# Patient Record
Sex: FEMALE | Race: WHITE | NOT HISPANIC OR LATINO | Employment: OTHER | URBAN - METROPOLITAN AREA
[De-identification: names, ages, dates, MRNs, and addresses within clinical notes are randomized per-mention and may not be internally consistent; named-entity substitution may affect disease eponyms.]

---

## 2017-01-03 ENCOUNTER — HOSPITAL ENCOUNTER (OUTPATIENT)
Dept: RADIOLOGY | Facility: HOSPITAL | Age: 67
Discharge: HOME | End: 2017-01-03
Attending: INTERNAL MEDICINE
Payer: MEDICARE

## 2017-01-03 ENCOUNTER — TRANSCRIBE ORDERS (OUTPATIENT)
Dept: ADMINISTRATIVE | Facility: HOSPITAL | Age: 67
End: 2017-01-03

## 2017-01-03 DIAGNOSIS — R06.02 SOB (SHORTNESS OF BREATH): ICD-10-CM

## 2017-01-03 DIAGNOSIS — R05.9 COUGH: Primary | ICD-10-CM

## 2017-01-03 DIAGNOSIS — R05.9 COUGH: ICD-10-CM

## 2017-01-03 PROCEDURE — 71020 HB CHEST X-RAY 2VW FRONTAL&LATL: CPT

## 2017-03-23 ENCOUNTER — TRANSCRIBE ORDERS (OUTPATIENT)
Dept: ADMINISTRATIVE | Facility: HOSPITAL | Age: 67
End: 2017-03-23

## 2017-03-23 DIAGNOSIS — M54.2 CERVICAL SPINE PAIN: Primary | ICD-10-CM

## 2017-03-30 ENCOUNTER — HOSPITAL ENCOUNTER (OUTPATIENT)
Dept: RADIOLOGY | Facility: HOSPITAL | Age: 67
Discharge: HOME | End: 2017-03-30
Attending: ANESTHESIOLOGY
Payer: MEDICARE

## 2017-03-30 DIAGNOSIS — M54.2 CERVICAL SPINE PAIN: ICD-10-CM

## 2017-03-30 PROCEDURE — 72141 MRI NECK SPINE W/O DYE: CPT

## 2017-05-11 ENCOUNTER — APPOINTMENT (OUTPATIENT)
Dept: LAB | Facility: CLINIC | Age: 67
End: 2017-05-11
Payer: MEDICARE

## 2017-05-11 ENCOUNTER — TRANSCRIBE ORDERS (OUTPATIENT)
Dept: LAB | Facility: CLINIC | Age: 67
End: 2017-05-11

## 2017-05-11 DIAGNOSIS — D64.0 CONGENITAL SIDEROBLASTIC ANEMIA (HCC): ICD-10-CM

## 2017-05-11 DIAGNOSIS — N18.1 CHRONIC KIDNEY DISEASE, STAGE I: ICD-10-CM

## 2017-05-11 DIAGNOSIS — R51.9 FACIAL PAIN: ICD-10-CM

## 2017-05-11 DIAGNOSIS — M79.10 MYALGIA: ICD-10-CM

## 2017-05-11 DIAGNOSIS — R10.9 ABDOMINAL PAIN, UNSPECIFIED SITE: ICD-10-CM

## 2017-05-11 DIAGNOSIS — R53.0 NEOPLASTIC MALIGNANT RELATED FATIGUE: ICD-10-CM

## 2017-05-11 DIAGNOSIS — Z79.899 HIGH RISK MEDICATION USE: ICD-10-CM

## 2017-05-11 DIAGNOSIS — R94.5 NONSPECIFIC ABNORMAL RESULTS OF LIVER FUNCTION STUDY: ICD-10-CM

## 2017-05-11 DIAGNOSIS — M25.50 PAIN IN JOINT, SITE UNSPECIFIED: ICD-10-CM

## 2017-05-11 DIAGNOSIS — R51.9 FACIAL PAIN: Primary | ICD-10-CM

## 2017-05-11 LAB
ALBUMIN SERPL BCP-MCNC: 3.9 G/DL (ref 3.5–5)
ALP SERPL-CCNC: 61 U/L (ref 46–116)
ALT SERPL W P-5'-P-CCNC: 28 U/L (ref 12–78)
ANION GAP SERPL CALCULATED.3IONS-SCNC: 6 MMOL/L (ref 4–13)
AST SERPL W P-5'-P-CCNC: 13 U/L (ref 5–45)
BASOPHILS # BLD AUTO: 0.03 THOUSANDS/ΜL (ref 0–0.1)
BASOPHILS NFR BLD AUTO: 0 % (ref 0–1)
BILIRUB SERPL-MCNC: 0.96 MG/DL (ref 0.2–1)
BUN SERPL-MCNC: 11 MG/DL (ref 5–25)
CALCIUM SERPL-MCNC: 8.6 MG/DL (ref 8.3–10.1)
CHLORIDE SERPL-SCNC: 105 MMOL/L (ref 100–108)
CHOLEST SERPL-MCNC: 175 MG/DL (ref 50–200)
CO2 SERPL-SCNC: 30 MMOL/L (ref 21–32)
CREAT SERPL-MCNC: 0.51 MG/DL (ref 0.6–1.3)
EOSINOPHIL # BLD AUTO: 0.19 THOUSAND/ΜL (ref 0–0.61)
EOSINOPHIL NFR BLD AUTO: 3 % (ref 0–6)
ERYTHROCYTE [DISTWIDTH] IN BLOOD BY AUTOMATED COUNT: 13 % (ref 11.6–15.1)
GFR SERPL CREATININE-BSD FRML MDRD: >60 ML/MIN/1.73SQ M
GLUCOSE P FAST SERPL-MCNC: 85 MG/DL (ref 65–99)
HCT VFR BLD AUTO: 44.7 % (ref 34.8–46.1)
HDLC SERPL-MCNC: 80 MG/DL (ref 40–60)
HGB BLD-MCNC: 14.8 G/DL (ref 11.5–15.4)
LDLC SERPL CALC-MCNC: 79 MG/DL (ref 0–100)
LYMPHOCYTES # BLD AUTO: 2.96 THOUSANDS/ΜL (ref 0.6–4.47)
LYMPHOCYTES NFR BLD AUTO: 40 % (ref 14–44)
MCH RBC QN AUTO: 32.1 PG (ref 26.8–34.3)
MCHC RBC AUTO-ENTMCNC: 33.1 G/DL (ref 31.4–37.4)
MCV RBC AUTO: 97 FL (ref 82–98)
MONOCYTES # BLD AUTO: 0.5 THOUSAND/ΜL (ref 0.17–1.22)
MONOCYTES NFR BLD AUTO: 7 % (ref 4–12)
NEUTROPHILS # BLD AUTO: 3.7 THOUSANDS/ΜL (ref 1.85–7.62)
NEUTS SEG NFR BLD AUTO: 50 % (ref 43–75)
NRBC BLD AUTO-RTO: 0 /100 WBCS
PLATELET # BLD AUTO: 294 THOUSANDS/UL (ref 149–390)
PMV BLD AUTO: 10.5 FL (ref 8.9–12.7)
POTASSIUM SERPL-SCNC: 3.7 MMOL/L (ref 3.5–5.3)
PROT SERPL-MCNC: 6.4 G/DL (ref 6.4–8.2)
RBC # BLD AUTO: 4.61 MILLION/UL (ref 3.81–5.12)
SODIUM SERPL-SCNC: 141 MMOL/L (ref 136–145)
T3 SERPL-MCNC: 0.7 NG/ML (ref 0.6–1.8)
T3FREE SERPL-MCNC: 2.35 PG/ML (ref 2.3–4.2)
T4 FREE SERPL-MCNC: 1.05 NG/DL (ref 0.76–1.46)
TRIGL SERPL-MCNC: 81 MG/DL
TSH SERPL DL<=0.05 MIU/L-ACNC: 0.69 UIU/ML (ref 0.36–3.74)
WBC # BLD AUTO: 7.39 THOUSAND/UL (ref 4.31–10.16)

## 2017-05-11 PROCEDURE — 84480 ASSAY TRIIODOTHYRONINE (T3): CPT

## 2017-05-11 PROCEDURE — 80053 COMPREHEN METABOLIC PANEL: CPT | Performed by: INTERNAL MEDICINE

## 2017-05-11 PROCEDURE — 84481 FREE ASSAY (FT-3): CPT

## 2017-05-11 PROCEDURE — 85025 COMPLETE CBC W/AUTO DIFF WBC: CPT | Performed by: INTERNAL MEDICINE

## 2017-05-11 PROCEDURE — 36415 COLL VENOUS BLD VENIPUNCTURE: CPT | Performed by: INTERNAL MEDICINE

## 2017-05-11 PROCEDURE — 84439 ASSAY OF FREE THYROXINE: CPT | Performed by: INTERNAL MEDICINE

## 2017-05-11 PROCEDURE — 84443 ASSAY THYROID STIM HORMONE: CPT

## 2017-05-11 PROCEDURE — 80061 LIPID PANEL: CPT | Performed by: INTERNAL MEDICINE

## 2018-04-23 ENCOUNTER — APPOINTMENT (OUTPATIENT)
Dept: PHYSICAL THERAPY | Facility: CLINIC | Age: 68
End: 2018-04-23
Payer: MEDICARE

## 2018-04-25 ENCOUNTER — EVALUATION (OUTPATIENT)
Dept: PHYSICAL THERAPY | Facility: CLINIC | Age: 68
End: 2018-04-25
Payer: MEDICARE

## 2018-04-25 DIAGNOSIS — M54.2 NECK PAIN: Primary | ICD-10-CM

## 2018-04-25 PROCEDURE — 97163 PT EVAL HIGH COMPLEX 45 MIN: CPT

## 2018-04-25 PROCEDURE — G8978 MOBILITY CURRENT STATUS: HCPCS

## 2018-04-25 PROCEDURE — G8979 MOBILITY GOAL STATUS: HCPCS

## 2018-04-25 NOTE — PROGRESS NOTES
PT Evaluation     Today's date: 2018  Patient name: Camelia Escalona  : [birth year]  MRN: [id number]  Referring provider: Merlin Sandifer, MD  Dx:   Encounter Diagnosis     ICD-10-CM    1  Neck pain M54 2        Start Time: 1145  Stop Time: 1230  Total time in clinic (min): 45 minutes    Assessment  Impairments: abnormal coordination, abnormal muscle tone, abnormal or restricted ROM, impaired balance, impaired physical strength and pain with function  Other impairment: Cervical Pain associated with ROM movements    Patient is not irritable  Assessment details: Patient is a 69year old female reporting to skilled PT with reports of neck pain and vertigo  Upon assessment of oculomotor function, patient had unremarkable findings that would indicate potential peripheral vestibular involvement causing her self reports of vertigo  Positional assessment for BPPV unremarkable  mCTSIB screening yields patient has difficulty with vestibular component of balance, losing balance after 5 seconds with feet together eyes closed on foam  Patient not deemed a fall risk via the DGI, TUG, or Gait Speed, but it should be noted that patient has a tendency to stumble with sit to stand transition, which is attributed to her low back pain, so this should be noted with her plan of care to prevent the potential for falls  Upon orthopedic examination of patient's cervical spine, patient presents with limited cervical spine ROM in all planes of motion tested, as well as tenderness to palpation in all cervical paraspinal and cervical musculature  Patient deep neck flexor strength limited, which could contribute to her neck pain  Patient limited in cervical strength in all tested planes  Special Testing for cervical radicular symptoms yielded positive findings for bilateral cervical radiculopathy symptoms extending down to her digits   Patient and treating therapist spoke about her progress, patient noted that at this time she is most limited with her cervical neck pain and bilateral UE radicular symptoms and requested that her POC focus around her reduction in UE radicular and cervical pain symptoms  Patient requires skilled PT to improve her bilateral cervical radicular symptoms, decrease her cervical pain, improve her bilateral UE function, to monitor her dizziness, and to maximize her function  Understanding of Dx/Px/POC: excellent   Prognosis: good    Goals  Short Term Goals:    1  Patient will improve her cervical spine ROM in all measured planes by 10 degrees or more in 4 weeks in order to improve her ability to turn her head while driving  2  Patient will decrease her cervical spine pain/discomfort to 4/10 or less in 4 weeks in order to improve her ability to sit statically with decreased neck pain  3  Patient will decrease her radicular symptoms in her bilateral UE to her elbow or more proximally in 4 weeks in order to improve her numbness and tingling in her fingers to allow her to  objects  4  Patient will improve her DGI by 2 points in 4 weeks in order to improve her dynamic balance capabilities  Long Term Goals:  1  Patient will improve her cervical spine ROM in all measured planes to WNL in 8 weeks in order to improve her ability to turn her head during conversation  2  Patient will improve her cervical spine MMT in all tested planes by 1/2 a grade or more in 8 weeks in order to improve her neck strength  3  Patient will improve her deep neck flexor strength to hold a proper chin tuck for at least 10 seconds to improve her deep neck flexor strength to improve her posture  4  Patient will be independent with comprehensive HEP in 8 weeks to manage her care at home         Plan  Planned modality interventions: biofeedback and TENS  Planned therapy interventions: abdominal trunk stabilization, ADL retraining, ADL training, joint mobilization, manual therapy, massage, IADL retraining, balance, balance/weight bearing training, motor coordination training, muscle pump exercises, neuromuscular re-education, patient education, postural training, body mechanics training, breathing training, compression, coordination, strengthening, stretching, therapeutic exercise, therapeutic activities, therapeutic training, transfer training, home exercise program, graded motor, graded exercise, graded activity, gait training, functional ROM exercises and flexibility  Other planned therapy interventions: CPT Codes: 38989, 01 39 27 97 60, [id number], 24129, 85687, , [id number], [id number], 96838  Frequency: 2x week  Duration in visits: 16  Duration in weeks: 8  Treatment plan discussed with: patient  Plan details: POC Certification Dates: 2018-2018        Subjective Evaluation    History of Present Illness  Date of onset: 2018  Mechanism of injury: Patient is a 69year old female reporting to skilled PT today for reports of neck pain, bilateral UE pain, and reports of dizziness  Patient states that she has been suffering from vertigo over the past few years, reporting "since "  Patient states that she states it is "under control, following up with my ENT every 3 months"  Patient reports currently she is suffering from "debilitating neck pain and arm numbness"  Patient reported that the pain is greatest with sitting for long periods of time, and cannot find a relieving position  Patient also reports a history of low back pain, which she notes is difficult to sit for long periods of time, and "when I stand up I slightly lose my balance due to pain in the low back"  Patient reports occasional blurriness, does not see an eye doctor     Recurrent probem    Quality of life: excellent    Pain  No pain reported  Current pain ratin  At best pain rating: 3  At worst pain ratin  Location: Cervical Spine   Quality: radiating and dull ache  Relieving factors: medications, relaxation and change in position  Aggravating factors: sitting and overhead activity  Progression: worsening    Social Support  Steps to enter house: yes  0  Stairs in house: yes   12  Lives with: alone    Employment status: not working  Hand dominance: right  Exercise history: Walks dog for 20 minutes every day      Diagnostic Tests  MRI studies: abnormal  Treatments  Previous treatment: physical therapy (Physical Therapy on R shoulder)  Patient Goals  Patient goals for therapy: return to sport/leisure activities and decreased pain  Patient goal: Patient reports her goals are to improve her head and neck pain and to improve her neck pressure           Objective     Static Posture     Comments  MCTSIB-   Feet Together, Eyes Open Firm- 30 seconds  Feet Together, Eyes Closed Firm- 30 seconds  Feet Together, Eyes Open Foam- 30 seconds  Feet Together, Eyes Closed Foam- 5 seconds     Active Range of Motion   Cervical/Thoracic Spine   Cervical    Flexion: 55 degrees   Extension: 40 degrees   Left lateral flexion: 30 degrees   Right lateral flexion: 35 degrees   Left rotation: 65 degrees   Right rotation: 60 degrees     Strength/Myotome Testing   Cervical Spine   Neck extension: 3  Neck flexion: 4 (pain)    Left   Interossei strength (t1): 4+  Neck lateral flexion (C3): 3+    Right   Interossei strength (t1): 4+etr  Neck lateral flexion (C3): 3+    Additional Strength Details  Cervical Rotation Bilaterally- 4-/5     Deep Neck Flexor Strength- unable to tuck chin without activating SCM and bilateral Scalenes    Ambulation     Comments   Gait Speed- 10 meters/8 2 seconds= 1 21 meters/second  TUG- 7 51 seconds    It should be noted that the patient experiences low back pain with sit to stand transitions that causes her to occasionally mis-step and stumble    General Comments     Cervical/Thoracic Comments  MVBI: Negative Bilaterally; bilateral UE numbness with testing     Oculomotor Assessment  -Smooth Pursuits- Normal Tracking  -NPC- Normal Tracking, 4" away  -Saccades- Normal  -VOR- No dizziness  -VOR Cancel- No dizziness, normal tracking  -Head Thrust- Negative bilaterally     Positional Testing-   [place]-Hallpike R- Negative 1x  Angella-Hallpike L- Negative 1x    Special Tests-   -Spurling A Bilaterally- positive  -Spurling B Bilaterally- positive  -Cervical Compression- Positive for cervical pain  -Cervical Distraction- positive for cervical muscular pain      Flowsheet Rows      Most Recent Value   PT/OT G-Codes   Current Score  45   Projected Score  74   FOTO information reviewed  Yes   Assessment Type  Evaluation   G code set  Mobility: Walking & Moving Around   Mobility: Walking and Moving Around Current Status ()  CK   Mobility: Walking and Moving Around Goal Status ()        DGI- 22/24    Precautions: Bilateral UE Radiculopathy    Daily Treatment Diary     Manual                                                                                   Exercise Diary                                                                                                                                                                                                                                                                                      Modalities

## 2018-04-25 NOTE — LETTER
May 1, 2018    Georgia Devlin MD  [address] [address]    Patient: Noa Triplett   YOB: 1950   Date of Visit: 2018     Encounter Diagnosis     ICD-10-CM    1  Neck pain M54 2        Dear Dr Jo Carry:    [facility]  Please verify that you agree therapy should continue by signing the attached document and sending it back to our office  If you have any questions or concerns, please don't hesitate to call  Sincerely,    Carla Caban PT      Referring Provider:      I certify that I have read the below Plan of Care and certify the need for these services furnished under this plan of treatment while under my care  Georgia Devlin MD  [address]  [address] [id number]  VIA Facsimile: [phone]          PT Evaluation     Today's date: 2018  Patient name: Noa Triplett  :   MRN: [id number]  Referring provider: Zoey Jamil MD  Dx:   Encounter Diagnosis     ICD-10-CM    1  Neck pain M54 2        Start Time: 1145  Stop Time: 1230  Total time in clinic (min): 45 minutes    Assessment  Impairments: abnormal coordination, abnormal muscle tone, abnormal or restricted ROM, impaired balance, impaired physical strength and pain with function  Other impairment: Cervical Pain associated with ROM movements    Patient is not irritable  Assessment details: Patient is a 69year old female reporting to skilled PT with reports of neck pain and vertigo  Upon assessment of oculomotor function, patient had unremarkable findings that would indicate potential peripheral vestibular involvement causing her self reports of vertigo  Positional assessment for BPPV unremarkable   mCTSIB screening yields patient has difficulty with vestibular component of balance, losing balance after 5 seconds with feet together eyes closed on foam  Patient not deemed a fall risk via the DGI, TUG, or Gait Speed, but it should be noted that patient has a tendency to stumble with sit to stand transition, which is attributed to her low back pain, so this should be noted with her plan of care to prevent the potential for falls  Upon orthopedic examination of patient's cervical spine, patient presents with limited cervical spine ROM in all planes of motion tested, as well as tenderness to palpation in all cervical paraspinal and cervical musculature  Patient deep neck flexor strength limited, which could contribute to her neck pain  Patient limited in cervical strength in all tested planes  Special Testing for cervical radicular symptoms yielded positive findings for bilateral cervical radiculopathy symptoms extending down to her digits  Patient and treating therapist spoke about her progress, patient noted that at this time she is most limited with her cervical neck pain and bilateral UE radicular symptoms and requested that her POC focus around her reduction in UE radicular and cervical pain symptoms  Patient requires skilled PT to improve her bilateral cervical radicular symptoms, decrease her cervical pain, improve her bilateral UE function, to monitor her dizziness, and to maximize her function  Understanding of Dx/Px/POC: excellent   Prognosis: good    Goals  Short Term Goals:    1  Patient will improve her cervical spine ROM in all measured planes by 10 degrees or more in 4 weeks in order to improve her ability to turn her head while driving  2  Patient will decrease her cervical spine pain/discomfort to 4/10 or less in 4 weeks in order to improve her ability to sit statically with decreased neck pain  3  Patient will decrease her radicular symptoms in her bilateral UE to her elbow or more proximally in 4 weeks in order to improve her numbness and tingling in her fingers to allow her to  objects     4  Patient will improve her DGI by 2 points in 4 weeks in order to improve her dynamic balance capabilities  Long Term Goals:  1  Patient will improve her cervical spine ROM in all measured planes to WNL in 8 weeks in order to improve her ability to turn her head during conversation  2  Patient will improve her cervical spine MMT in all tested planes by 1/2 a grade or more in 8 weeks in order to improve her neck strength  3  Patient will improve her deep neck flexor strength to hold a proper chin tuck for at least 10 seconds to improve her deep neck flexor strength to improve her posture  4  Patient will be independent with comprehensive HEP in 8 weeks to manage her care at home  Plan  Planned modality interventions: biofeedback and TENS  Planned therapy interventions: abdominal trunk stabilization, ADL retraining, ADL training, joint mobilization, manual therapy, massage, IADL retraining, balance, balance/weight bearing training, motor coordination training, muscle pump exercises, neuromuscular re-education, patient education, postural training, body mechanics training, breathing training, compression, coordination, strengthening, stretching, therapeutic exercise, therapeutic activities, therapeutic training, transfer training, home exercise program, graded motor, graded exercise, graded activity, gait training, functional ROM exercises and flexibility  Other planned therapy interventions: CPT Codes: 92182, 18901, [id number], 06275, 51390, , [id number], [id number], 27070  Frequency: 2x week  Duration in visits: 16  Duration in weeks: 8  Treatment plan discussed with: patient  Plan details: POC Certification Dates: 4/25/2018-6/20/2018        Subjective Evaluation    History of Present Illness  Date of onset: 1/31/2018  Mechanism of injury: Patient is a 69year old female reporting to skilled PT today for reports of neck pain, bilateral UE pain, and reports of dizziness  Patient states that she has been suffering from vertigo over the past few years, reporting "since 2003"   Patient states that she states it is "under control, following up with my ENT every 3 months"  Patient reports currently she is suffering from "debilitating neck pain and arm numbness"  Patient reported that the pain is greatest with sitting for long periods of time, and cannot find a relieving position  Patient also reports a history of low back pain, which she notes is difficult to sit for long periods of time, and "when I stand up I slightly lose my balance due to pain in the low back"  Patient reports occasional blurriness, does not see an eye doctor  Recurrent probem    Quality of life: excellent    Pain  No pain reported  Current pain ratin  At best pain rating: 3  At worst pain ratin  Location: Cervical Spine   Quality: radiating and dull ache  Relieving factors: medications, relaxation and change in position  Aggravating factors: sitting and overhead activity  Progression: worsening    Social Support  Steps to enter house: yes  0  Stairs in house: yes   12  Lives with: alone    Employment status: not working  Hand dominance: right  Exercise history: Walks dog for 20 minutes every day      Diagnostic Tests  MRI studies: abnormal  Treatments  Previous treatment: physical therapy (Physical Therapy on R shoulder)  Patient Goals  Patient goals for therapy: return to sport/leisure activities and decreased pain  Patient goal: Patient reports her goals are to improve her head and neck pain and to improve her neck pressure           Objective     Static Posture     Comments  MCTSIB-   Feet Together, Eyes Open Firm- 30 seconds  Feet Together, Eyes Closed Firm- 30 seconds  Feet Together, Eyes Open Foam- 30 seconds  Feet Together, Eyes Closed Foam- 5 seconds     Active Range of Motion   Cervical/Thoracic Spine   Cervical    Flexion: 55 degrees   Extension: 40 degrees   Left lateral flexion: 30 degrees   Right lateral flexion: 35 degrees   Left rotation: 65 degrees   Right rotation: 60 degrees     Strength/Myotome Testing   Cervical Spine Neck extension: 3  Neck flexion: 4 (pain)    Left   Interossei strength (t1): 4+  Neck lateral flexion (C3): 3+    Right   Interossei strength (t1): 4+etr  Neck lateral flexion (C3): 3+    Additional Strength Details  Cervical Rotation Bilaterally- 4-/5     Deep Neck Flexor Strength- unable to tuck chin without activating SCM and bilateral Scalenes    Ambulation     Comments   Gait Speed- 10 meters/8 2 seconds= 1 21 meters/second  TUG- 7 51 seconds    It should be noted that the patient experiences low back pain with sit to stand transitions that causes her to occasionally mis-step and stumble    General Comments     Cervical/Thoracic Comments  MVBI: Negative Bilaterally; bilateral UE numbness with testing     Oculomotor Assessment  -Smooth Pursuits- Normal Tracking  -NPC- Normal Tracking, 4" away  -Saccades- Normal  -VOR- No dizziness  -VOR Cancel- No dizziness, normal tracking  -Head Thrust- Negative bilaterally     Positional Testing-   Angella-Hallpike R- Negative 1x  Angella-Hallpike L- Negative 1x    Special Tests-   -Spurling A Bilaterally- positive  -Spurling B Bilaterally- positive  -Cervical Compression- Positive for cervical pain  -Cervical Distraction- positive for cervical muscular pain      Flowsheet Rows      Most Recent Value   PT/OT G-Codes   Current Score  45   Projected Score  74   FOTO information reviewed  Yes   Assessment Type  Evaluation   G code set  Mobility: Walking & Moving Around   Mobility: Walking and Moving Around Current Status ()  CK   Mobility: Walking and Moving Around Goal Status ()        DGI- 22/24    Precautions: Bilateral UE Radiculopathy    Daily Treatment Diary     Manual                                                                                   Exercise Diary Modalities

## 2018-05-01 ENCOUNTER — TRANSCRIBE ORDERS (OUTPATIENT)
Dept: PHYSICAL THERAPY | Facility: CLINIC | Age: 68
End: 2018-05-01

## 2018-05-01 DIAGNOSIS — M54.2 NECK PAIN: Primary | ICD-10-CM

## 2018-05-08 ENCOUNTER — EVALUATION (OUTPATIENT)
Dept: PHYSICAL THERAPY | Facility: CLINIC | Age: 68
End: 2018-05-08
Payer: MEDICARE

## 2018-05-08 DIAGNOSIS — M54.2 NECK PAIN: Primary | ICD-10-CM

## 2018-05-08 PROCEDURE — 97110 THERAPEUTIC EXERCISES: CPT | Performed by: PHYSICAL THERAPIST

## 2018-05-08 PROCEDURE — 97140 MANUAL THERAPY 1/> REGIONS: CPT | Performed by: PHYSICAL THERAPIST

## 2018-05-08 PROCEDURE — G8981 BODY POS CURRENT STATUS: HCPCS

## 2018-05-08 PROCEDURE — G8982 BODY POS GOAL STATUS: HCPCS

## 2018-05-08 NOTE — PROGRESS NOTES
Daily Note     Today's date: 2018  Patient name: Arlene Weber  :   MRN: [id number]  Referring provider: aMrk De La Vega MD  Dx:   Encounter Diagnosis     ICD-10-CM    1  Neck pain M54 2                   Subjective: Pt reports 7/10 pain in bilateral cervical spine today  States that she has the worst pain in the morning  She also has bilateral arm numbness from elbow to fingers  Objective: See treatment diary below    Precautions -  GERD , IBSD, RBOT , Fibromyalgia , Allergy to bandaids / tapes    Specialty Daily Treatment Diary     Manual  18      STM to paraspinals, UT , levator scap  8 min      C-S distraction  3 min      C-S PROM  4 min                          Exercise Diary         C-S isometrics  10x   5 "      Side bend AROM  10x      Rotation AROM  10x                                                                                                                                                  Modalities        MH  10 min              Visit # 1 2                Assessment: Tolerated treatment well  Patient would benefit from continued PT  Tightness throughout upper quarter musculature  Plan: Continue per plan of care

## 2018-05-14 ENCOUNTER — APPOINTMENT (OUTPATIENT)
Dept: PHYSICAL THERAPY | Facility: CLINIC | Age: 68
End: 2018-05-14
Payer: MEDICARE

## 2018-05-16 ENCOUNTER — APPOINTMENT (OUTPATIENT)
Dept: PHYSICAL THERAPY | Facility: CLINIC | Age: 68
End: 2018-05-16
Payer: MEDICARE

## 2018-05-18 ENCOUNTER — OFFICE VISIT (OUTPATIENT)
Dept: PHYSICAL THERAPY | Facility: CLINIC | Age: 68
End: 2018-05-18
Payer: MEDICARE

## 2018-05-18 DIAGNOSIS — M54.2 NECK PAIN: Primary | ICD-10-CM

## 2018-05-18 PROCEDURE — 97110 THERAPEUTIC EXERCISES: CPT | Performed by: PHYSICAL THERAPIST

## 2018-05-18 PROCEDURE — 97140 MANUAL THERAPY 1/> REGIONS: CPT | Performed by: PHYSICAL THERAPIST

## 2018-05-18 NOTE — PROGRESS NOTES
Daily Note     Today's date: 2018  Patient name: iMnnie Lamar  :   MRN: [id number]  Referring provider: Aminata Franco MD  Dx:   Encounter Diagnosis     ICD-10-CM    1  Neck pain M54 2                   Subjective: Pt reports feeling painfree for one hour following last treatment  Objective: See treatment diary below    Precautions -  GERD , IBSD, RBOT , Fibromyalgia , Allergy to bandaids / tapes    Specialty Daily Treatment Diary     Manual  18     STM to paraspinals, UT , levator scap  8 min 14 min     C-S distraction  3 min 3 min     C-S PROM  4 min 4 min                         Exercise Diary         C-S isometrics  10x   5 " 10x     Side bend AROM  10x 20x     Rotation AROM  10x 20x     NuStep   10 min                                                                                                                                         Modalities        MH  10 min deferred             Visit # 1 2 3               Assessment: Left side > Right side is positive for TTP levator and UT  Plan: Continue per plan of care

## 2018-05-22 ENCOUNTER — OFFICE VISIT (OUTPATIENT)
Dept: PHYSICAL THERAPY | Facility: CLINIC | Age: 68
End: 2018-05-22
Payer: MEDICARE

## 2018-05-22 DIAGNOSIS — M54.2 NECK PAIN: Primary | ICD-10-CM

## 2018-05-22 PROCEDURE — 97140 MANUAL THERAPY 1/> REGIONS: CPT | Performed by: PHYSICAL THERAPIST

## 2018-05-22 PROCEDURE — 97110 THERAPEUTIC EXERCISES: CPT | Performed by: PHYSICAL THERAPIST

## 2018-05-22 NOTE — PROGRESS NOTES
Daily Note     Today's date: 2018  Patient name: Minnie Lamar  :   MRN: [id number]  Referring provider: Aminata Franco MD  Dx:   Encounter Diagnosis     ICD-10-CM    1  Neck pain M54 2                   Subjective: Pt reports feeling soreness following last session  She feels it was due to the NuStep  Objective: See treatment diary below    Precautions -  GERD , IBSD, RBOT , Fibromyalgia , Allergy to bandaids / tapes    Specialty Daily Treatment Diary     Manual  18    STM to paraspinals, UT , levator scap  8 min 14 min 12 min    C-S distraction  3 min 3 min 4 min    C-S PROM  4 min 4 min 3 min                        Exercise Diary         C-S isometrics  10x   5 " 10x 10    Side bend AROM  10x 20x 20x    Rotation AROM  10x 20x 20x    NuStep   10 min ---    Scap squeezes    20x                                                                                                                                Modalities        MH  10 min deferred 10 min            Visit # 1 2 3 4              Assessment: No dizziness reported during session  She felt no increase in pain following treatment  Plan: Continue per plan of care

## 2018-05-24 ENCOUNTER — OFFICE VISIT (OUTPATIENT)
Dept: PHYSICAL THERAPY | Facility: CLINIC | Age: 68
End: 2018-05-24
Payer: MEDICARE

## 2018-05-24 DIAGNOSIS — M54.2 NECK PAIN: Primary | ICD-10-CM

## 2018-05-24 PROCEDURE — 97110 THERAPEUTIC EXERCISES: CPT

## 2018-05-24 PROCEDURE — 97140 MANUAL THERAPY 1/> REGIONS: CPT

## 2018-05-24 NOTE — PROGRESS NOTES
Daily Note     Today's date: 2018  Patient name: Doroteo Roldan  : [id number]  MRN: [id number]  Referring provider: Oly Pillai MD  Dx:   Encounter Diagnosis     ICD-10-CM    1  Neck pain M54 2                   Subjective: Pt reports 7/10 pain at "the bone that runs up behind my ear" referring to R side  Stated that her stomach was not feeling well  Objective: See treatment diary below    Precautions -  GERD , IBSD, RBOT , Fibromyalgia , Allergy to bandaids / tapes    Specialty Daily Treatment Diary     Manual  18   STM to paraspinals, UT , levator scap  8 min 14 min 12 min 13   C-S distraction  3 min 3 min 4 min 4 min   C-S PROM  4 min 4 min 3 min 3 min                       Exercise Diary         C-S isometrics  10x   5 " 10x 10 10 min   Side bend AROM  10x 20x 20x 20    Rotation AROM  10x 20x 20x 20    NuStep   10 min ---    Scap squeezes    20x 20                                                                                                                               Modalities        MH  10 min deferred 10 min 10 min           Visit # 1 2 3 4 5             Assessment: Pt reported feeling dizzy when she sat up from supine position which passed  Stated that she felt relief from pressure in R scalenes and Ut after session      Plan: Continue per plan of care

## 2018-05-29 ENCOUNTER — OFFICE VISIT (OUTPATIENT)
Dept: PHYSICAL THERAPY | Facility: CLINIC | Age: 68
End: 2018-05-29
Payer: MEDICARE

## 2018-05-29 DIAGNOSIS — M54.2 NECK PAIN: Primary | ICD-10-CM

## 2018-05-29 PROCEDURE — 97140 MANUAL THERAPY 1/> REGIONS: CPT | Performed by: PHYSICAL THERAPIST

## 2018-05-29 PROCEDURE — 97110 THERAPEUTIC EXERCISES: CPT | Performed by: PHYSICAL THERAPIST

## 2018-05-29 NOTE — PROGRESS NOTES
Daily Note     Today's date: 2018  Patient name: Vishal Monday  :   MRN: [id number]  Referring provider: Juanita Milian MD  Dx:   Encounter Diagnosis     ICD-10-CM    1  Neck pain M54 2                   Subjective: Pt reports 6/10 pain now but it was very sore on Thursday last week  She had C-S pain and bilateral upper arm pain  Objective: See treatment diary below    Precautions -  GERD , IBSD, RBOT , Fibromyalgia , Allergy to bandaids / tapes    Specialty Daily Treatment Diary     Manual  18       STM to paraspinals, UT , levator scap 12 min       C-S distraction 4 min       C-S PROM 3 min                           Exercise Diary         C-S isometrics 10x       Side bend AROM 20x       Rotation AROM 20x       NuStep        Scap squeezes 20x                                                                                                                                   Modalities         10 min               Visit # 6                 Assessment: Improved PROM for C-S SB today without increase in pain  Scalene restrictions respond well to MFR  Plan: Continue per plan of care

## 2018-06-01 ENCOUNTER — APPOINTMENT (OUTPATIENT)
Dept: PHYSICAL THERAPY | Facility: CLINIC | Age: 68
End: 2018-06-01
Payer: MEDICARE

## 2018-06-06 ENCOUNTER — OFFICE VISIT (OUTPATIENT)
Dept: PHYSICAL THERAPY | Facility: CLINIC | Age: 68
End: 2018-06-06
Payer: MEDICARE

## 2018-06-06 DIAGNOSIS — M54.2 NECK PAIN: Primary | ICD-10-CM

## 2018-06-06 PROCEDURE — 97110 THERAPEUTIC EXERCISES: CPT | Performed by: PHYSICAL THERAPIST

## 2018-06-06 PROCEDURE — 97140 MANUAL THERAPY 1/> REGIONS: CPT | Performed by: PHYSICAL THERAPIST

## 2018-06-06 NOTE — PROGRESS NOTES
Daily Note     Today's date: 2018  Patient name: Jena Hoffmann  :   MRN: [id number]  Referring provider: Jean-Pierre Blank MD  Dx:   Encounter Diagnosis     ICD-10-CM    1  Neck pain M54 2                   Subjective: She has a soreness today  She reports she is now able to lie on her Right side and is able to sleep for 4 to 5 straight hours  Objective: See treatment diary below    Precautions -  GERD , IBSD, RBOT , Fibromyalgia , Allergy to bandaids / tapes    Specialty Daily Treatment Diary     Manual  18      STM to paraspinals, UT , levator scap 12 min 12 min      C-S distraction 4 min 2 min      C-S PROM 3 min 4 min                          Exercise Diary         C-S isometrics 10x 10x      Side bend AROM 20x 20x      Rotation AROM 20x 20x      NuStep        Scap squeezes 20x 20x      Over head ball raise  Modalities        MH 10 min 10 min              Visit # 6 7                Assessment:  Soft tissue mobility in UT and paraspinals is improved  Plan: Continue PT  Attempt ball raise over head

## 2018-06-11 ENCOUNTER — OFFICE VISIT (OUTPATIENT)
Dept: PHYSICAL THERAPY | Facility: CLINIC | Age: 68
End: 2018-06-11
Payer: MEDICARE

## 2018-06-11 DIAGNOSIS — M54.2 NECK PAIN: Primary | ICD-10-CM

## 2018-06-11 PROCEDURE — 97140 MANUAL THERAPY 1/> REGIONS: CPT

## 2018-06-11 PROCEDURE — 97110 THERAPEUTIC EXERCISES: CPT

## 2018-06-11 NOTE — PROGRESS NOTES
Daily Note     Today's date: 2018  Patient name: Emily Mccarthy  :   MRN: [id number]  Referring provider: Sara Renteria MD  Dx:   Encounter Diagnosis     ICD-10-CM    1  Neck pain M54 2        Start Time: 1300  Stop Time: 1350  Total time in clinic (min): 50 minutes    Subjective: Pt reports soreness on the left side of her neck - right side is better; pt admits she overdid it moving some wrought iron furniture this past weekend         Objective: See treatment diary below    Precautions -  GERD , IBSD, RBOT , Fibromyalgia , Allergy to bandaids / tapes    Specialty Daily Treatment Diary     Manual  18     STM to paraspinals, UT , levator scap 12 min 12 min 12 min      C-S distraction 4 min 2 min 5 min      C-S PROM 3 min 4 min                          Exercise Diary         C-S isometrics 10x 10x 10 x adding ext      Side bend AROM 20x 20x 10 x e a     Rotation AROM 20x 20x 10 x ea     NuStep        Scap squeezes 20x 20x 10 x/ yellow tband x 10      Over head ball raise  Home         Lev scap stretch seated   3 x 10 sec         UT stretch seated 3 x 10 sec                                                                                                          Modalities        MH 10 min 10 min 10 min supine              Visit # 6 7 8                Assessment: Tolerated treatment well  Patient would benefit from continued PT ; pt with cord like tightness left scalenes; pt had some discomfort in L/S performing scap rows with yellow tband - cueing to utilize TrA which decreased pain; pt reported some increased relief with STM/self stretching     Plan: Continue per plan of care

## 2018-06-13 ENCOUNTER — OFFICE VISIT (OUTPATIENT)
Dept: PHYSICAL THERAPY | Facility: CLINIC | Age: 68
End: 2018-06-13
Payer: MEDICARE

## 2018-06-13 DIAGNOSIS — M54.2 NECK PAIN: Primary | ICD-10-CM

## 2018-06-13 PROCEDURE — 97140 MANUAL THERAPY 1/> REGIONS: CPT | Performed by: PHYSICAL THERAPIST

## 2018-06-13 NOTE — PROGRESS NOTES
Daily Note     Today's date: 2018  Patient name: Vishal Monday  : [id number]  MRN: [id number]  Referring provider: Juanita Milian MD  Dx:   Encounter Diagnosis     ICD-10-CM    1  Neck pain M54 2                   Subjective: Still sore on left side from moving her deck furniture this past weekend  Pain 5/10   Objective: See treatment diary below    Precautions -  GERD , IBSD, RBOT , Fibromyalgia , Allergy to bandaids / tapes    Specialty Daily Treatment Diary     Manual  18     STM to paraspinals, UT , levator scap 12 min 12 min 12 min  18 min    C-S distraction 4 min 2 min 5 min  3 min    C-S PROM 3 min 4 min  4 min                        Exercise Diary         C-S isometrics 10x 10x 10 x adding ext      Side bend AROM 20x 20x 10 x e a 10x    Rotation AROM 20x 20x 10 x ea 10x    NuStep        Scap squeezes 20x 20x 10 x/ yellow tband x 10  10x    Over head ball raise  Home         Lev scap stretch seated   3 x 10 sec         UT stretch seated 3 x 10 sec                                                                                                          Modalities        MH 10 min 10 min 10 min supine  10 min            Visit # 6 7 8  9              Assessment:  She was positive for tightness and tenderness Left > Right UT, levator and SCM  Improved by the end of session with STM techniques  Plan: Continue per plan of care

## 2018-06-20 ENCOUNTER — OFFICE VISIT (OUTPATIENT)
Dept: PHYSICAL THERAPY | Facility: CLINIC | Age: 68
End: 2018-06-20
Payer: MEDICARE

## 2018-06-20 DIAGNOSIS — M54.2 NECK PAIN: Primary | ICD-10-CM

## 2018-06-20 PROCEDURE — 97140 MANUAL THERAPY 1/> REGIONS: CPT

## 2018-06-20 PROCEDURE — 97110 THERAPEUTIC EXERCISES: CPT

## 2018-06-20 NOTE — PROGRESS NOTES
Daily Note     Today's date: 2018  Patient name: Donnell Larsen  :   MRN: [id number]  Referring provider: Emilie Dial MD  Dx:   Encounter Diagnosis     ICD-10-CM    1  Neck pain M54 2                   Subjective:  Pt reports 8/10 L sided CS pain which is into L shld and down L UE  Objective: See treatment diary below    Precautions -  GERD , IBSD, RBOT , Fibromyalgia , Allergy to bandaids / tapes    Specialty Daily Treatment Diary     Manual  5/29/18 6/6/18 6/11/18 6/15/18 6/20/18   STM to paraspinals, UT , levator scap 12 min 12 min 12 min  18 min 22 min   C-S distraction 4 min 2 min 5 min  3 min    C-S PROM 3 min 4 min  4 min 3 min                       Exercise Diary         C-S isometrics 10x 10x 10 x adding ext      Side bend AROM 20x 20x 10 x e a 10x 10   Rotation AROM 20x 20x 10 x ea 10x 10   NuStep        Scap squeezes 20x 20x 10 x/ yellow tband x 10  10x 10   Over head ball raise  Home         Lev scap stretch seated   3 x 10 sec         UT stretch seated 3 x 10 sec                                                                                                          Modalities        MH 10 min 10 min 10 min supine  10 min 10 min           Visit # 6 7 8  9 10 Foto done             Assessment:  She was positive for tightness and tenderness Left > Right UT, levator and SCM  Pt attempted stretching with no difficulty on R but, displayed a great deal of pain when attempting to stretch L side and activitiy was stopped  Limited PROM movement secondary to pain  Improved by the end of session with STM techniques  Plan: Continue per plan of care

## 2018-06-22 ENCOUNTER — OFFICE VISIT (OUTPATIENT)
Dept: PHYSICAL THERAPY | Facility: CLINIC | Age: 68
End: 2018-06-22
Payer: MEDICARE

## 2018-06-22 DIAGNOSIS — M54.2 NECK PAIN: Primary | ICD-10-CM

## 2018-06-22 PROCEDURE — 97110 THERAPEUTIC EXERCISES: CPT | Performed by: PHYSICAL THERAPIST

## 2018-06-22 PROCEDURE — G8982 BODY POS GOAL STATUS: HCPCS | Performed by: PHYSICAL THERAPIST

## 2018-06-22 PROCEDURE — G8981 BODY POS CURRENT STATUS: HCPCS | Performed by: PHYSICAL THERAPIST

## 2018-06-22 PROCEDURE — 97140 MANUAL THERAPY 1/> REGIONS: CPT | Performed by: PHYSICAL THERAPIST

## 2018-06-25 ENCOUNTER — APPOINTMENT (OUTPATIENT)
Dept: PHYSICAL THERAPY | Facility: CLINIC | Age: 68
End: 2018-06-25
Payer: MEDICARE

## 2018-06-27 ENCOUNTER — APPOINTMENT (OUTPATIENT)
Dept: PHYSICAL THERAPY | Facility: CLINIC | Age: 68
End: 2018-06-27
Payer: MEDICARE

## 2018-07-03 ENCOUNTER — OFFICE VISIT (OUTPATIENT)
Dept: PHYSICAL THERAPY | Facility: CLINIC | Age: 68
End: 2018-07-03
Payer: MEDICARE

## 2018-07-03 DIAGNOSIS — M54.2 NECK PAIN: Primary | ICD-10-CM

## 2018-07-03 PROCEDURE — 97110 THERAPEUTIC EXERCISES: CPT | Performed by: PHYSICAL THERAPIST

## 2018-07-03 PROCEDURE — 97140 MANUAL THERAPY 1/> REGIONS: CPT | Performed by: PHYSICAL THERAPIST

## 2018-07-03 NOTE — PROGRESS NOTES
Daily Note     Today's date: 7/3/2018  Patient name: Cassie Valdez  :   MRN: [id number]  Referring provider: Edouard An MD  Dx:   Encounter Diagnosis     ICD-10-CM    1  Neck pain M54 2                   Subjective:  She has left C-S pain today and c/o headache  Pain 6/10  Increase in pain possibly due to skimming her pool 3 times daily  Objective: See treatment diary below    Precautions -  GERD , IBSD, RBOT , Fibromyalgia , Allergy to bandaids / tapes    Specialty Daily Treatment Diary     Manual  6/22/18 7/3/18      STM to paraspinals, UT , levator scap 12 min 15 min      C-S distraction 4 min 3 min      C-S PROM 3 min 4 min                          Exercise Diary         C-S isometrics 10x 10x      Side bend AROM 20x 10x      Rotation AROM 20x 10x      NuStep        Scap squeezes 20x 10x      Over head ball raise  10x 10x      Lev scap stretch        UT stretch 10x 10x                                                                                                          Modalities        MH 10 min 10 min              Visit # 11 12                Assessment:   She agreed to lessening the number of times she skims her pool  Left sided UT is tight  Plan: Continue per plan of care

## 2018-07-06 ENCOUNTER — OFFICE VISIT (OUTPATIENT)
Dept: PHYSICAL THERAPY | Facility: CLINIC | Age: 68
End: 2018-07-06
Payer: MEDICARE

## 2018-07-06 DIAGNOSIS — M54.2 NECK PAIN: Primary | ICD-10-CM

## 2018-07-06 PROCEDURE — 97140 MANUAL THERAPY 1/> REGIONS: CPT

## 2018-07-06 NOTE — PROGRESS NOTES
Daily Note     Today's date: 2018  Patient name: Annie Russell  : [id number]  MRN: [id number]  Referring provider: Nic King MD  Dx:   Encounter Diagnosis     ICD-10-CM    1  Neck pain M54 2                   Subjective:  Pt reports no CS pain today stating that this was a very good week for her and she was  skimming her pool 3 times daily with no difficulty        Objective: See treatment diary below    Precautions -  GERD , IBSD, RBOT , Fibromyalgia , Allergy to bandaids / tapes    Specialty Daily Treatment Diary     Manual  6/22/18 7/3/18 7/6/18     STM to paraspinals, UT , levator scap 12 min 15 min 15 min     C-S distraction 4 min 3 min 4 min     C-S PROM 3 min 4 min 5 min                         Exercise Diary         C-S isometrics 10x 10x      Side bend AROM 20x 10x      Rotation AROM 20x 10x      NuStep        Scap squeezes 20x 10x      Over head ball raise  10x 10x      Lev scap stretch        UT stretch 10x 10x                                                                                                          Modalities        MH 10 min 10 min 5 min             Visit # 11 12 13               Assessment:    R CS  feels restricted  Pt had to leave early secondary having a jaret contractor coming to her home  Plan: Continue per plan of care

## 2018-07-10 ENCOUNTER — OFFICE VISIT (OUTPATIENT)
Dept: PHYSICAL THERAPY | Facility: CLINIC | Age: 68
End: 2018-07-10
Payer: MEDICARE

## 2018-07-10 DIAGNOSIS — M54.2 NECK PAIN: Primary | ICD-10-CM

## 2018-07-10 PROCEDURE — 97140 MANUAL THERAPY 1/> REGIONS: CPT

## 2018-07-10 NOTE — PROGRESS NOTES
Daily Note     Today's date: 7/10/2018  Patient name: Yazmin Ceballos  :   MRN: [id number]  Referring provider: Mechelle Mitchell MD  Dx:   Encounter Diagnosis     ICD-10-CM    1  Neck pain M54 2                   Subjective:  Pt reports CS feeling sore "but that's OK"        Objective: See treatment diary below    Precautions -  GERD , IBSD, RBOT , Fibromyalgia , Allergy to bandaids / tapes    Specialty Daily Treatment Diary     Manual  6/22/18 7/3/18 7/6/18 7/10/18    STM to paraspinals, UT , levator scap 12 min 15 min 15 min 15 min    C-S distraction 4 min 3 min 4 min 5 min    C-S PROM 3 min 4 min 5 min 5 min                        Exercise Diary    7/6 7/10    C-S isometrics 10x 10x      Side bend AROM 20x 10x      Rotation AROM 20x 10x      NuStep        Scap squeezes 20x 10x      Over head ball raise  10x 10x      Lev scap stretch        UT stretch 10x 10x                                                                                                          Modalities    7/10    MH 10 min 10 min 5 min 10 min            Visit # 11 12 13 14              Assessment:  CS feel less restricted with improved ROM pt deferred exercise today stating that she does it at home and feels good     Plan: Continue per plan of care

## 2018-07-13 ENCOUNTER — OFFICE VISIT (OUTPATIENT)
Dept: PHYSICAL THERAPY | Facility: CLINIC | Age: 68
End: 2018-07-13
Payer: MEDICARE

## 2018-07-13 DIAGNOSIS — M54.2 NECK PAIN: Primary | ICD-10-CM

## 2018-07-13 PROCEDURE — G8983 BODY POS D/C STATUS: HCPCS | Performed by: PHYSICAL THERAPIST

## 2018-07-13 PROCEDURE — 97140 MANUAL THERAPY 1/> REGIONS: CPT | Performed by: PHYSICAL THERAPIST

## 2018-07-13 PROCEDURE — G8982 BODY POS GOAL STATUS: HCPCS | Performed by: PHYSICAL THERAPIST

## 2018-07-13 NOTE — PROGRESS NOTES
Daily Note     Today's date: 2018  Patient name: Vinnie Greenfield  :   MRN: [id number]  Referring provider: Dion Paulino MD  Dx:   Encounter Diagnosis     ICD-10-CM    1  Neck pain M54 2        Start Time: 1050  Stop Time: 1135  Total time in clinic (min): 45 minutes    Subjective:  Pt reports feeling 95% better overall  She feels she has resumed all of her ADLs  Objective: See treatment diary below    Precautions -  GERD , IBSD, RBOT , Fibromyalgia , Allergy to bandaids / tapes    Specialty Daily Treatment Diary     Manual  6/22/18 7/3/18 7/6/18 7/10/18 7/12/18   STM to paraspinals, UT , levator scap 12 min 15 min 15 min 15 min 15 min   C-S distraction 4 min 3 min 4 min 5 min 5 min   C-S PROM 3 min 4 min 5 min 5 min 5 min                       Exercise Diary    7/6 7/10    C-S isometrics 10x 10x      Side bend AROM 20x 10x   10x   Rotation AROM 20x 10x      NuStep        Scap squeezes 20x 10x   10x   Over head ball raise   10x 10x      Lev scap stretch        UT stretch 10x 10x   10x                                                                                                       Modalities    7/10    MH 10 min 10 min 5 min 10 min            Visit # 11 12 13 14 15             Assessment:   All goals achieved    Plan:  D/C at this time

## 2018-07-13 NOTE — PROGRESS NOTES
PT Discharge    Today's date: 2018  Patient name: Glenna Esqueda  :   MRN: [id number]  Referring provider: Bennie Álvarez MD  Dx:   Encounter Diagnosis     ICD-10-CM    1  Neck pain M54 2        Start Time: 1050  Stop Time: 1135  Total time in clinic (min): 45 minutes    Assessment  Other impairment: Cervical Pain associated with ROM movements  Patient presents with symptom irritability no  Goals  Short Term Goals:    1  Patient will improve her cervical spine ROM in all measured planes by 10 degrees or more in 4 weeks in order to improve her ability to turn her head while driving  - met  2  Patient will decrease her cervical spine pain/discomfort to 4/10 or less in 4 weeks in order to improve her ability to sit statically with decreased neck pain  - met  3  Patient will decrease her radicular symptoms in her bilateral UE to her elbow or more proximally in 4 weeks in order to improve her numbness and tingling in her fingers to allow her to  objects  - met  4  Patient will improve her DGI by 2 points in 4 weeks in order to improve her dynamic balance capabilities  - met    Long Term Goals:  1  Patient will improve her cervical spine ROM in all measured planes to WNL in 8 weeks in order to improve her ability to turn her head during conversation   - met  2  Patient will improve her cervical spine MMT in all tested planes by 1/2 a grade or more in 8 weeks in order to improve her neck strength  - met  3  Patient will improve her deep neck flexor strength to hold a proper chin tuck for at least 10 seconds to improve her deep neck flexor strength to improve her posture  - met  4  Patient will be independent with comprehensive HEP in 8 weeks to manage her care at home  - met      Plan  Plan details: D/C at this time after 15 sessions        Subjective Evaluation    History of Present Illness  Mechanism of injury:  She feels 95% better overall    Recurrent probem    Quality of life: excellent    Pain  Current pain ratin  At best pain ratin  At worst pain ratin    Social Support  0  12    Treatments  Treatments tried: Physical Therapy on R shoulder  Objective     Active Range of Motion   Cervical/Thoracic Spine   Cervical    Flexion: WFL  Extension: WFL  Left lateral flexion: Neck active lateral bend left: Minimal ROM loss  Right lateral flexion: Neck active lateral bend right: Minimal ROM loss  Left rotation: Neck active rotation left: Minimal ROM loss  Right rotation: Neck active rotation right: Minimal ROM loss       Strength/Myotome Testing   Cervical Spine   Neck extension: 4  Neck flexion: 4 (pain)    Left   Interossei strength (t1): 4+  Neck lateral flexion (C3): 4    Right   Interossei strength (t1): 4+etr  Neck lateral flexion (C3): 4      Flowsheet Rows      Most Recent Value   PT/OT G-Codes   Current Score  53   Projected Score  56   FOTO information reviewed  Yes   Assessment Type  Discharge   G code set  Changing & Maintaining Body Position   Changing and Maintaining Body Position Goal Status ()  CK   Changing and Maintaining Body Position Discharge Status ()  CK      DGI-     Precautions: Bilateral UE Radiculopathy

## 2018-08-23 ENCOUNTER — TRANSCRIBE ORDERS (OUTPATIENT)
Dept: PHYSICAL THERAPY | Facility: CLINIC | Age: 68
End: 2018-08-23

## 2018-08-23 DIAGNOSIS — M54.2 NECK PAIN: Primary | ICD-10-CM

## 2020-07-26 NOTE — PROGRESS NOTES
Daily Note     Today's date: 2018  Patient name: Adalberto Galvin  :   MRN: [id number]  Referring provider: Madelyn Solano MD  Dx:   Encounter Diagnosis     ICD-10-CM    1  Neck pain M54 2                   Subjective:  Pt reports 8/10 L sided CS pain which is into L shld and down L UE  Objective: See treatment diary below    Precautions -  GERD , IBSD, RBOT , Fibromyalgia , Allergy to bandaids / tapes    Specialty Daily Treatment Diary     Manual  18         STM to paraspinals, UT , levator scap 12 min       C-S distraction 4 min       C-S PROM 3 min                           Exercise Diary         C-S isometrics 10x       Side bend AROM 20x       Rotation AROM 20x       NuStep        Scap squeezes 20x       Over head ball raise  10x       Lev scap stretch        UT stretch 10x                                                                                                           Modalities        MH 10 min               Visit # 11                 Assessment:  C-S sidebend ROM improving passively    Plan: Continue per plan of care  No

## 2021-03-15 ENCOUNTER — TRANSCRIBE ORDERS (OUTPATIENT)
Dept: ADMINISTRATIVE | Facility: HOSPITAL | Age: 71
End: 2021-03-15

## 2021-03-15 DIAGNOSIS — N83.291 OTHER OVARIAN CYST, RIGHT SIDE: Primary | ICD-10-CM

## 2021-08-08 ENCOUNTER — OFFICE VISIT (OUTPATIENT)
Dept: URGENT CARE | Facility: CLINIC | Age: 71
End: 2021-08-08
Payer: MEDICARE

## 2021-08-08 VITALS
HEIGHT: 63 IN | HEART RATE: 55 BPM | BODY MASS INDEX: 17.36 KG/M2 | SYSTOLIC BLOOD PRESSURE: 151 MMHG | DIASTOLIC BLOOD PRESSURE: 66 MMHG | WEIGHT: 98 LBS | RESPIRATION RATE: 14 BRPM | OXYGEN SATURATION: 99 % | TEMPERATURE: 99 F

## 2021-08-08 DIAGNOSIS — S40.811A ABRASION OF RIGHT ARM, INITIAL ENCOUNTER: Primary | ICD-10-CM

## 2021-08-08 PROCEDURE — 99213 OFFICE O/P EST LOW 20 MIN: CPT | Performed by: PHYSICIAN ASSISTANT

## 2021-08-08 RX ORDER — CEPHALEXIN 500 MG/1
500 CAPSULE ORAL EVERY 8 HOURS SCHEDULED
Qty: 21 CAPSULE | Refills: 0 | Status: SHIPPED | OUTPATIENT
Start: 2021-08-08 | End: 2021-08-15

## 2021-08-08 NOTE — PROGRESS NOTES
[facility]        NAME: Dianna Osler is a 65 y o  female  : 1950    MRN: [id number]  DATE: 2021  TIME: 3:06 PM    Assessment and Plan   Abrasion of right arm, initial encounter [S40 184K]  1  Abrasion of right arm, initial encounter  cephalexin (KEFLEX) 500 mg capsule         Patient Instructions     Patient Instructions   Will prescribe antibiotic to be filled if development of signs of infection including increased redness, swelling, discharge/drainage  Recommend OTC cortisone cream to area of irritation  Keep wound clean and dry  Follow up with PCP  Return or be seen in ER with any worsening or progressing symptoms  Follow up with PCP in 3-5 days  Proceed to  ER if symptoms worsen  Chief Complaint     Chief Complaint   Patient presents with    Abrasion     R FOREARM, scratched by dog         History of Present Illness        Patient is a 68-year-old female presenting today with right arm abrasion times 2 days  Patient states while at the dog store a couple days ago getting food for her dog she was scratched on her right arm by another dog she was attempting to pat states the nails from the dog scratched her right arm  Patient states following the incident she cleaned the area with water and hydrogen peroxide, has been using antibiotic ointment and keeping the area covered with a Band-Aid, notes she has had reactions to Band-Aids and past and believes area of irritation surrounding abrasion may be due to that  Patient expresses concern of potential infection to abrasion stating she has had multiple cellulitis is in the past  She notes that area around abrasion has become slightly red and swollen  Patient denies fever, chills, discharge, drainage,  N/V/ D  Review of Systems   Review of Systems   Constitutional: Negative for chills and fever  HENT: Negative for ear pain and sore throat  Eyes: Negative for pain and visual disturbance     Respiratory: Negative for cough and shortness of breath  Cardiovascular: Negative for chest pain and palpitations  Gastrointestinal: Negative for abdominal pain and vomiting  Genitourinary: Negative for dysuria and hematuria  Musculoskeletal:        Abrasion of right arm, redness, swelling  Skin: Negative for pallor  Neurological: Negative for seizures and syncope  All other systems reviewed and are negative  Current Medications       Current Outpatient Medications:     Acetaminophen-Codeine (TYLENOL WITH CODEINE #4 PO), q6h, Disp: , Rfl:     fluticasone (FLOVENT DISKUS) 50 MCG/BLIST diskus inhaler, Inhale 1 puff daily  , Disp: , Rfl:     MECLIZINE HCL PO, Take 6 25 mg by mouth as needed  , Disp: , Rfl:     metoprolol tartrate (LOPRESSOR) 50 mg tablet, Take 50 mg by mouth 2 (two) times a day , Disp: , Rfl:     cephalexin (KEFLEX) 500 mg capsule, Take 1 capsule (500 mg total) by mouth every 8 (eight) hours for 7 days, Disp: 21 capsule, Rfl: 0    chlordiazepoxide-clidinium (LIBRAX) 5-2 5 mg per capsule, Take 1 capsule by mouth daily  (Patient not taking: Reported on 8/8/2021), Disp: , Rfl:     metoprolol tartrate (LOPRESSOR) 25 mg tablet, Take 25 mg by mouth as needed  (Patient not taking: Reported on 8/8/2021), Disp: , Rfl:     tiZANidine (ZANAFLEX) 2 mg tablet, Take 2 mg by mouth every 8 (eight) hours as needed for muscle spasms   (Patient not taking: Reported on 8/8/2021), Disp: , Rfl:     Current Allergies     Allergies as of 08/08/2021 - Reviewed 08/08/2021   Allergen Reaction Noted    Amoxicillin-pot clavulanate Diarrhea 02/29/2004    Aspirin GI Intolerance 05/08/2016    Epinephrine  05/08/2016    Penicillins  05/08/2016            The following portions of the patient's history were reviewed and updated as appropriate: allergies, current medications, past family history, past medical history, past social history, past surgical history and problem list      Past Medical History:   Diagnosis Date    Fibromyalgia     GERD (gastroesophageal reflux disease)     Inflammatory bowel disease     RVOT ventricular tachycardia (HCC)        Past Surgical History:   Procedure Laterality Date    SPINE SURGERY         No family history on file  Medications have been verified  Objective   /66   Pulse 55   Temp 99 °F (37 2 °C)   Resp 14   Ht 5' 3" (1 6 m)   Wt 44 5 kg (98 lb)   SpO2 99%   BMI 17 36 kg/m²        Physical Exam     Physical Exam  Vitals reviewed  Constitutional:       Appearance: Normal appearance  HENT:      Head: Normocephalic and atraumatic  Right Ear: Tympanic membrane, ear canal and external ear normal       Left Ear: Tympanic membrane, ear canal and external ear normal       Nose: Nose normal       Mouth/Throat:      Mouth: Mucous membranes are moist       Pharynx: Oropharynx is clear  Eyes:      Conjunctiva/sclera: Conjunctivae normal       Pupils: Pupils are equal, round, and reactive to light  Cardiovascular:      Rate and Rhythm: Normal rate and regular rhythm  Pulses: Normal pulses  Heart sounds: Normal heart sounds  Pulmonary:      Effort: Pulmonary effort is normal       Breath sounds: Normal breath sounds  Musculoskeletal:         General: Normal range of motion  Cervical back: Normal range of motion  No tenderness  Lymphadenopathy:      Cervical: No cervical adenopathy  Skin:     General: Skin is warm  Capillary Refill: Capillary refill takes less than 2 seconds  Comments: Two 1 cm superficial abrasions of posterior aspect of right forearm consistent with scratches, abrasions appeared clean, no signs of infection  Area surrounding abrasion with mild redness and swelling consistent with area of adhesive from Band-Aid  Placement  Area is nontender to palpation, no warmth  Gross sensation intact, 2+ distal pulses  Neurological:      General: No focal deficit present        Mental Status: She is alert and oriented to person, place, and time     Psychiatric:         Mood and Affect: Mood normal          Behavior: Behavior normal

## 2021-08-08 NOTE — PATIENT INSTRUCTIONS
Will prescribe antibiotic to be filled if development of signs of infection including increased redness, swelling, discharge/drainage  Recommend OTC cortisone cream to area of irritation  Keep wound clean and dry  Follow up with PCP  Return or be seen in ER with any worsening or progressing symptoms

## 2022-04-07 ENCOUNTER — APPOINTMENT (OUTPATIENT)
Dept: LAB | Facility: CLINIC | Age: 72
End: 2022-04-07
Payer: COMMERCIAL

## 2022-04-07 DIAGNOSIS — N39.0 URINARY TRACT INFECTION WITHOUT HEMATURIA, SITE UNSPECIFIED: ICD-10-CM

## 2022-04-07 PROCEDURE — 87086 URINE CULTURE/COLONY COUNT: CPT

## 2022-04-08 LAB — BACTERIA UR CULT: NORMAL

## 2022-08-07 ENCOUNTER — HOSPITAL ENCOUNTER (EMERGENCY)
Facility: HOSPITAL | Age: 72
Discharge: HOME | End: 2022-08-07
Attending: EMERGENCY MEDICINE | Admitting: EMERGENCY MEDICINE
Payer: COMMERCIAL

## 2022-08-07 ENCOUNTER — APPOINTMENT (EMERGENCY)
Dept: RADIOLOGY | Facility: HOSPITAL | Age: 72
End: 2022-08-07
Payer: COMMERCIAL

## 2022-08-07 VITALS
TEMPERATURE: 98 F | HEART RATE: 56 BPM | SYSTOLIC BLOOD PRESSURE: 120 MMHG | RESPIRATION RATE: 20 BRPM | DIASTOLIC BLOOD PRESSURE: 58 MMHG | OXYGEN SATURATION: 95 %

## 2022-08-07 DIAGNOSIS — R42 VERTIGO: Primary | ICD-10-CM

## 2022-08-07 DIAGNOSIS — R42 LIGHTHEADEDNESS: ICD-10-CM

## 2022-08-07 DIAGNOSIS — I77.1 SUBCLAVIAN ARTERY STENOSIS (HCC): ICD-10-CM

## 2022-08-07 LAB
2HR DELTA HS TROPONIN: -1 NG/L
ALBUMIN SERPL BCP-MCNC: 3.4 G/DL (ref 3.5–5)
ALP SERPL-CCNC: 61 U/L (ref 46–116)
ALT SERPL W P-5'-P-CCNC: 19 U/L (ref 12–78)
ANION GAP SERPL CALCULATED.3IONS-SCNC: 10 MMOL/L (ref 4–13)
APTT PPP: 30 SECONDS (ref 23–37)
AST SERPL W P-5'-P-CCNC: 17 U/L (ref 5–45)
BASOPHILS # BLD AUTO: 0.04 THOUSANDS/ΜL (ref 0–0.1)
BASOPHILS NFR BLD AUTO: 0 % (ref 0–1)
BILIRUB SERPL-MCNC: 0.82 MG/DL (ref 0.2–1)
BUN SERPL-MCNC: 11 MG/DL (ref 5–25)
CALCIUM ALBUM COR SERPL-MCNC: 9.4 MG/DL (ref 8.3–10.1)
CALCIUM SERPL-MCNC: 8.9 MG/DL (ref 8.3–10.1)
CARDIAC TROPONIN I PNL SERPL HS: 3 NG/L
CARDIAC TROPONIN I PNL SERPL HS: 4 NG/L
CHLORIDE SERPL-SCNC: 99 MMOL/L (ref 96–108)
CO2 SERPL-SCNC: 28 MMOL/L (ref 21–32)
CREAT SERPL-MCNC: 0.59 MG/DL (ref 0.6–1.3)
EOSINOPHIL # BLD AUTO: 0.06 THOUSAND/ΜL (ref 0–0.61)
EOSINOPHIL NFR BLD AUTO: 1 % (ref 0–6)
ERYTHROCYTE [DISTWIDTH] IN BLOOD BY AUTOMATED COUNT: 13 % (ref 11.6–15.1)
GFR SERPL CREATININE-BSD FRML MDRD: 91 ML/MIN/1.73SQ M
GLUCOSE SERPL-MCNC: 88 MG/DL (ref 65–140)
HCT VFR BLD AUTO: 43.2 % (ref 34.8–46.1)
HGB BLD-MCNC: 14.2 G/DL (ref 11.5–15.4)
IMM GRANULOCYTES # BLD AUTO: 0.06 THOUSAND/UL (ref 0–0.2)
IMM GRANULOCYTES NFR BLD AUTO: 1 % (ref 0–2)
INR PPP: 1.12 (ref 0.84–1.19)
LYMPHOCYTES # BLD AUTO: 1.49 THOUSANDS/ΜL (ref 0.6–4.47)
LYMPHOCYTES NFR BLD AUTO: 11 % (ref 14–44)
MCH RBC QN AUTO: 31.8 PG (ref 26.8–34.3)
MCHC RBC AUTO-ENTMCNC: 32.9 G/DL (ref 31.4–37.4)
MCV RBC AUTO: 97 FL (ref 82–98)
MONOCYTES # BLD AUTO: 0.81 THOUSAND/ΜL (ref 0.17–1.22)
MONOCYTES NFR BLD AUTO: 6 % (ref 4–12)
NEUTROPHILS # BLD AUTO: 10.79 THOUSANDS/ΜL (ref 1.85–7.62)
NEUTS SEG NFR BLD AUTO: 81 % (ref 43–75)
NRBC BLD AUTO-RTO: 0 /100 WBCS
PLATELET # BLD AUTO: 247 THOUSANDS/UL (ref 149–390)
PMV BLD AUTO: 9.6 FL (ref 8.9–12.7)
POTASSIUM SERPL-SCNC: 4.1 MMOL/L (ref 3.5–5.3)
PROT SERPL-MCNC: 6.5 G/DL (ref 6.4–8.4)
PROTHROMBIN TIME: 14.5 SECONDS (ref 11.6–14.5)
RBC # BLD AUTO: 4.46 MILLION/UL (ref 3.81–5.12)
SODIUM SERPL-SCNC: 137 MMOL/L (ref 135–147)
WBC # BLD AUTO: 13.25 THOUSAND/UL (ref 4.31–10.16)

## 2022-08-07 PROCEDURE — 70498 CT ANGIOGRAPHY NECK: CPT

## 2022-08-07 PROCEDURE — 96361 HYDRATE IV INFUSION ADD-ON: CPT

## 2022-08-07 PROCEDURE — 80053 COMPREHEN METABOLIC PANEL: CPT | Performed by: EMERGENCY MEDICINE

## 2022-08-07 PROCEDURE — 84484 ASSAY OF TROPONIN QUANT: CPT | Performed by: EMERGENCY MEDICINE

## 2022-08-07 PROCEDURE — 85730 THROMBOPLASTIN TIME PARTIAL: CPT | Performed by: EMERGENCY MEDICINE

## 2022-08-07 PROCEDURE — 99285 EMERGENCY DEPT VISIT HI MDM: CPT

## 2022-08-07 PROCEDURE — 36415 COLL VENOUS BLD VENIPUNCTURE: CPT | Performed by: EMERGENCY MEDICINE

## 2022-08-07 PROCEDURE — 85610 PROTHROMBIN TIME: CPT | Performed by: EMERGENCY MEDICINE

## 2022-08-07 PROCEDURE — 99285 EMERGENCY DEPT VISIT HI MDM: CPT | Performed by: EMERGENCY MEDICINE

## 2022-08-07 PROCEDURE — G1004 CDSM NDSC: HCPCS

## 2022-08-07 PROCEDURE — 93005 ELECTROCARDIOGRAM TRACING: CPT

## 2022-08-07 PROCEDURE — 70496 CT ANGIOGRAPHY HEAD: CPT

## 2022-08-07 PROCEDURE — 96374 THER/PROPH/DIAG INJ IV PUSH: CPT

## 2022-08-07 PROCEDURE — 85025 COMPLETE CBC W/AUTO DIFF WBC: CPT | Performed by: EMERGENCY MEDICINE

## 2022-08-07 RX ORDER — MORPHINE SULFATE 4 MG/ML
4 INJECTION, SOLUTION INTRAMUSCULAR; INTRAVENOUS ONCE
Status: COMPLETED | OUTPATIENT
Start: 2022-08-07 | End: 2022-08-07

## 2022-08-07 RX ADMIN — MORPHINE SULFATE 4 MG: 4 INJECTION INTRAVENOUS at 17:55

## 2022-08-07 RX ADMIN — SODIUM CHLORIDE 1000 ML: 0.9 INJECTION, SOLUTION INTRAVENOUS at 15:12

## 2022-08-07 RX ADMIN — IOHEXOL 65 ML: 350 INJECTION, SOLUTION INTRAVENOUS at 15:56

## 2022-08-07 NOTE — ED PROVIDER NOTES
History  Chief Complaint   Patient presents with    Dizziness     Pt states she was making lunch and suddenly felt very dizzy/weak/sweaty and fell to the floor  Denies LOC/did not her her head/no injuries  Patient has a history of vertigo as been prescribed Antivert as needed however she is afraid to take it because of other medications that she takes  She has been noticing increased frequency of episodes of non vertiginous dizziness associated with disequilibrium and lightheadedness in the last several weeks  These episodes are short lived and resolved without treatment and just with rest   Patient went to bed last night and she was well  She states she gets up every 2 hours during the night to use the bathroom  She last got up at 0700 hours and was well  Soon after she again experienced lightheadedness and generalized nonfocal weakness  Approximately 1 hour prior to arrival when she was making her lunch she felt sudden onset diaphoresis associated with room spinning dizziness nausea and weakness  She fell to the floor did not strike her head did not lose consciousness  She was unable to get up without assistance  She crawled on the floor to call for help  He developed a headache on the left frontal area sometime afterwards without vomiting  Vertiginous dizziness lasted about 1/2 hour and has improved by the time of arrival           Prior to Admission Medications   Prescriptions Last Dose Informant Patient Reported? Taking? Acetaminophen-Codeine (TYLENOL WITH CODEINE #4 PO)   Yes No   Sig: q6h   MECLIZINE HCL PO   Yes No   Sig: Take 6 25 mg by mouth as needed  chlordiazepoxide-clidinium (LIBRAX) 5-2 5 mg per capsule   Yes No   Sig: Take 1 capsule by mouth daily  Patient not taking: Reported on 8/8/2021   fluticasone (FLOVENT DISKUS) 50 MCG/BLIST diskus inhaler   Yes No   Sig: Inhale 1 puff daily  metoprolol tartrate (LOPRESSOR) 25 mg tablet   Yes No   Sig: Take 25 mg by mouth as needed  Patient not taking: Reported on 8/8/2021   metoprolol tartrate (LOPRESSOR) 50 mg tablet   Yes No   Sig: Take 50 mg by mouth 2 (two) times a day  tiZANidine (ZANAFLEX) 2 mg tablet   Yes No   Sig: Take 2 mg by mouth every 8 (eight) hours as needed for muscle spasms  Patient not taking: Reported on 8/8/2021      Facility-Administered Medications: None       Past Medical History:   Diagnosis Date    Fibromyalgia     GERD (gastroesophageal reflux disease)     Inflammatory bowel disease     RVOT ventricular tachycardia ([facility] [address] )        Past Surgical History:   Procedure Laterality Date    SPINE SURGERY         History reviewed  No pertinent family history  I have reviewed and agree with the history as documented  E-Cigarette/Vaping     E-Cigarette/Vaping Substances     Social History     Tobacco Use    Smoking status: Current Every Day Smoker     Packs/day: 0 25   Substance Use Topics    Alcohol use: No    Drug use: No       Review of Systems   Constitutional: Positive for diaphoresis  Negative for chills and fever  HENT: Negative for congestion and sore throat  Eyes: Negative for visual disturbance  Respiratory: Negative for cough and shortness of breath  Cardiovascular: Negative for chest pain  Gastrointestinal: Positive for nausea  Negative for abdominal pain and vomiting  Genitourinary: Negative for difficulty urinating and dysuria  Musculoskeletal: Negative for arthralgias and back pain  Skin: Negative for rash  Neurological: Positive for dizziness, weakness and light-headedness  Negative for syncope, speech difficulty and numbness  Hematological: Bruises/bleeds easily  Psychiatric/Behavioral: Positive for sleep disturbance  The patient is nervous/anxious  All other systems reviewed and are negative  Physical Exam  Physical Exam  Vitals and nursing note reviewed  Constitutional:       Appearance: Normal appearance  HENT:      Head: Normocephalic and atraumatic  Right Ear: External ear normal       Left Ear: External ear normal       Nose: Nose normal       Mouth/Throat:      Pharynx: Oropharynx is clear  Eyes:      Extraocular Movements: Extraocular movements intact  Conjunctiva/sclera: Conjunctivae normal    Cardiovascular:      Rate and Rhythm: Normal rate and regular rhythm  Pulses: Normal pulses  Pulmonary:      Effort: Pulmonary effort is normal    Abdominal:      Palpations: Abdomen is soft  Tenderness: There is no abdominal tenderness  Musculoskeletal:         General: Normal range of motion  Cervical back: Normal range of motion and neck supple  Skin:     General: Skin is warm and dry  Capillary Refill: Capillary refill takes less than 2 seconds  Neurological:      General: No focal deficit present  Mental Status: She is alert and oriented to person, place, and time  Cranial Nerves: No cranial nerve deficit  Sensory: No sensory deficit  Motor: No weakness        Coordination: Coordination normal    Psychiatric:         Mood and Affect: Mood normal          Behavior: Behavior normal          Vital Signs  ED Triage Vitals   Temperature Pulse Respirations Blood Pressure SpO2   08/07/22 1404 08/07/22 1404 08/07/22 1404 08/07/22 1404 08/07/22 1404   (!) 96 4 °F (35 8 °C) 59 20 (!) 108/48 94 %      Temp Source Heart Rate Source Patient Position - Orthostatic VS BP Location FiO2 (%)   08/07/22 1404 08/07/22 1404 08/07/22 1404 08/07/22 1404 --   Tympanic Monitor Lying Left arm       Pain Score       08/07/22 1755       7           Vitals:    08/07/22 1404 08/07/22 1516 08/07/22 1707 08/07/22 1715   BP: (!) 108/48  120/58 120/58   Pulse: 59 (!) 54 56 56   Patient Position - Orthostatic VS: Lying Lying Lying Lying         Visual Acuity      ED Medications  Medications   sodium chloride 0 9 % bolus 1,000 mL (0 mL Intravenous Stopped 8/7/22 1612)   iohexol (OMNIPAQUE) 350 MG/ML injection (MULTI-DOSE) 65 mL (65 mL Intravenous Given 8/7/22 1556)   morphine injection 4 mg (4 mg Intravenous Given 8/7/22 1755)       Diagnostic Studies  Results Reviewed     Procedure Component Value Units Date/Time    HS Troponin I 2hr [[id number]]  (Normal) Collected: 08/07/22 1709    Lab Status: Final result Specimen: Blood from Arm, Right Updated: 08/07/22 1743     hs TnI 2hr 3 ng/L      Delta 2hr hsTnI -1 ng/L     HS Troponin 0hr (reflex protocol) [[id number]]  (Normal) Collected: 08/07/22 1509    Lab Status: Final result Specimen: Blood from Arm, Right Updated: 08/07/22 1550     hs TnI 0hr 4 ng/L     Comprehensive metabolic panel [[id number]]  (Abnormal) Collected: 08/07/22 1509    Lab Status: Final result Specimen: Blood from Arm, Right Updated: 08/07/22 1542     Sodium 137 mmol/L      Potassium 4 1 mmol/L      Chloride 99 mmol/L      CO2 28 mmol/L      ANION GAP 10 mmol/L      BUN 11 mg/dL      Creatinine 0 59 mg/dL      Glucose 88 mg/dL      Calcium 8 9 mg/dL      Corrected Calcium 9 4 mg/dL      AST 17 U/L      ALT 19 U/L      Alkaline Phosphatase 61 U/L      Total Protein 6 5 g/dL      Albumin 3 4 g/dL      Total Bilirubin 0 82 mg/dL      eGFR 91 ml/min/1 73sq m     Narrative:      Phillip guidelines for Chronic Kidney Disease (CKD):     Stage 1 with normal or high GFR (GFR > 90 mL/min/1 73 square meters)    Stage 2 Mild CKD (GFR = 60-89 mL/min/1 73 square meters)    Stage 3A Moderate CKD (GFR = 45-59 mL/min/1 73 square meters)    Stage 3B Moderate CKD (GFR = 30-44 mL/min/1 73 square meters)    Stage 4 Severe CKD (GFR = 15-29 mL/min/1 73 square meters)    Stage 5 End Stage CKD (GFR <15 mL/min/1 73 square meters)  Note: GFR calculation is accurate only with a steady state creatinine    Protime-INR [[id number]]  (Normal) Collected: 08/07/22 1509    Lab Status: Final result Specimen: Blood from Arm, Right Updated: 08/07/22 1537     Protime 14 5 seconds      INR 1 12    APTT [[id number]]  (Normal) Collected: 08/07/22 1509    Lab Status: Final result Specimen: Blood from Arm, Right Updated: 08/07/22 1537     PTT 30 seconds     CBC and differential [[id number]]  (Abnormal) Collected: 08/07/22 1509    Lab Status: Final result Specimen: Blood from Arm, Right Updated: 08/07/22 1524     WBC 13 25 Thousand/uL      RBC 4 46 Million/uL      Hemoglobin 14 2 g/dL      Hematocrit 43 2 %      MCV 97 fL      MCH 31 8 pg      MCHC 32 9 g/dL      RDW 13 0 %      MPV 9 6 fL      Platelets 611 Thousands/uL      nRBC 0 /100 WBCs      Neutrophils Relative 81 %      Immat GRANS % 1 %      Lymphocytes Relative 11 %      Monocytes Relative 6 %      Eosinophils Relative 1 %      Basophils Relative 0 %      Neutrophils Absolute 10 79 Thousands/µL      Immature Grans Absolute 0 06 Thousand/uL      Lymphocytes Absolute 1 49 Thousands/µL      Monocytes Absolute 0 81 Thousand/µL      Eosinophils Absolute 0 06 Thousand/µL      Basophils Absolute 0 04 Thousands/µL                  CTA head and neck with and without contrast   Final Result by Gene Renie Hammans, DO (08/07 1613)      CT brain: No acute intracranial abnormality  CT angiography: There is advanced atherosclerotic change of the aortic arch and particular there is extensive calcification and severe stenosis of the left subclavian at its origin, likely in the range of 80-90%  Recommend follow-up with vascular    surgery  No other cervical atherosclerotic change  Normal intracranial vasculature  Incidental thyroid nodule(s) for which nonemergent thyroid ultrasound is recommended  This examination was marked "immediate notification" in Epic in order to begin the standard process by which the radiology reading room liaison alerts the referring practitioner                Workstation performed: [id number]                    Procedures  ECG 12 Lead Documentation Only    Date/Time: 8/7/2022 2:39 PM  Performed by: Sidney Rogers MD  Authorized by: Sidney Rogesr MD Indications / Diagnosis:  Dizziness  ECG reviewed by me, the ED Provider: yes    Patient location:  ED  Interpretation:     Interpretation: non-specific    Rate:     ECG rate:  53    ECG rate assessment: bradycardic    Rhythm:     Rhythm: sinus rhythm and sinus bradycardia    Ectopy:     Ectopy: none    QRS:     QRS axis:  Normal    QRS intervals:  Normal  Conduction:     Conduction: normal    ST segments:     ST segments:  Normal  T waves:     T waves: flattening      Flattening:  I, aVL and V1             ED Course                  Stroke Assessment     Row Name 08/07/22 1444             NIH Stroke Scale    Interval --      Level of Consciousness (1a ) 0      LOC Questions (1b ) 0      LOC Commands (1c ) 0      Best Gaze (2 ) 0      Visual (3 ) 0      Facial Palsy (4 ) 0      Motor Arm, Left (5a ) 0      Motor Arm, Right (5b ) 0      Motor Leg, Left (6a ) 0      Motor Leg, Right (6b ) 0      Limb Ataxia (7 ) 0      Sensory (8 ) 0      Best Language (9 ) 0      Dysarthria (10 ) 0      Extinction and Inattention (11 ) (Formerly Neglect) 0      Total 0                                          MDM  Number of Diagnoses or Management Options  Diagnosis management comments: A sudden onset of severe symptoms of vertigo with diaphoresis  Check cardiac profile, CTA of the head neck    Patient has no neuro deficits at this point, is back to baseline      Disposition  Final diagnoses:   Vertigo   Lightheadedness   Subclavian artery stenosis ([address] )     Time reflects when diagnosis was documented in both MDM as applicable and the Disposition within this note     Time User Action Codes Description Comment    8/7/2022  5:50 PM Ban Feliz Add [R42] Vertigo     8/7/2022  5:50 PM Mike Valerie A Add [R42] Lightheadedness     8/7/2022  5:51 PM Mikegeoffrey Padilla A Add [I77 1] Subclavian artery stenosis [facility])       ED Disposition     ED Disposition   Discharge    Condition   Stable    Date/Time   Sun Aug 7, 2022  5:50 PM Comment   Sherrie Rogers discharge to home/self care  Follow-up Information     Follow up With Specialties Details Why Contact Info    Dede Mercado MD Internal Medicine   [address] Kareen Shafer MD Cardiology Schedule an appointment as soon as possible for a visit   [address]  [place] [address]  [phone]            Discharge Medication List as of 8/7/2022  5:52 PM      CONTINUE these medications which have NOT CHANGED    Details   Acetaminophen-Codeine (TYLENOL WITH CODEINE #4 PO) q6h, Historical Med      chlordiazepoxide-clidinium (LIBRAX) 5-2 5 mg per capsule Take 1 capsule by mouth daily  , Until Discontinued, Historical Med      fluticasone (FLOVENT DISKUS) 50 MCG/BLIST diskus inhaler Inhale 1 puff daily  , Until Discontinued, Historical Med      MECLIZINE HCL PO Take 6 25 mg by mouth as needed  , Until Discontinued, Historical Med      !! metoprolol tartrate (LOPRESSOR) 25 mg tablet Take 25 mg by mouth as needed  , Until Discontinued, Historical Med      !! metoprolol tartrate (LOPRESSOR) 50 mg tablet Take 50 mg by mouth 2 (two) times a day , Until Discontinued, Historical Med      tiZANidine (ZANAFLEX) 2 mg tablet Take 2 mg by mouth every 8 (eight) hours as needed for muscle spasms  , Until Discontinued, Historical Med       !! - Potential duplicate medications found  Please discuss with provider  No discharge procedures on file      PDMP Review     None          ED Provider  Electronically Signed by           Antwon Sanchez MD  08/07/22 0615

## 2022-08-12 LAB
ATRIAL RATE: 53 BPM
P AXIS: 78 DEGREES
PR INTERVAL: 142 MS
QRS AXIS: 6 DEGREES
QRSD INTERVAL: 76 MS
QT INTERVAL: 458 MS
QTC INTERVAL: 429 MS
T WAVE AXIS: 55 DEGREES
VENTRICULAR RATE: 53 BPM

## 2022-08-12 PROCEDURE — 93010 ELECTROCARDIOGRAM REPORT: CPT | Performed by: INTERNAL MEDICINE

## 2022-08-19 ENCOUNTER — VBI (OUTPATIENT)
Dept: ADMINISTRATIVE | Facility: OTHER | Age: 72
End: 2022-08-19

## 2022-08-19 NOTE — TELEPHONE ENCOUNTER
Upon review of the In Basket request we were able to locate, review, and update the patient chart as requested for Medicare AW  Any additional questions or concerns should be emailed to the Practice Liaisons via [email or web address] email, please do not reply via In Basket      Thank you  Dolly Carey

## 2022-08-22 PROBLEM — M96.1 LUMBAR POST-LAMINECTOMY SYNDROME: Status: ACTIVE | Noted: 2022-08-22

## 2022-08-22 PROBLEM — R42 VERTIGO: Status: ACTIVE | Noted: 2022-08-22

## 2022-08-22 PROBLEM — M54.16 LUMBAR RADICULOPATHY: Status: ACTIVE | Noted: 2022-08-22

## 2022-08-22 PROBLEM — F32.A DEPRESSIVE DISORDER: Status: ACTIVE | Noted: 2022-08-22

## 2022-08-22 PROBLEM — G89.29 CHRONIC BACK PAIN: Status: ACTIVE | Noted: 2022-08-22

## 2022-08-22 PROBLEM — M54.9 CHRONIC BACK PAIN: Status: ACTIVE | Noted: 2022-08-22

## 2022-08-22 PROBLEM — K58.9 IRRITABLE BOWEL SYNDROME: Status: ACTIVE | Noted: 2022-08-22

## 2022-08-22 PROBLEM — J44.9 CHRONIC OBSTRUCTIVE LUNG DISEASE (HCC): Status: ACTIVE | Noted: 2022-08-22

## 2022-08-22 PROBLEM — F41.9 ANXIETY DISORDER: Status: ACTIVE | Noted: 2022-08-22

## 2022-08-22 PROBLEM — I10 HYPERTENSIVE DISORDER: Status: ACTIVE | Noted: 2022-08-22

## 2022-08-23 ENCOUNTER — OFFICE VISIT (OUTPATIENT)
Dept: INTERNAL MEDICINE CLINIC | Facility: CLINIC | Age: 72
End: 2022-08-23
Payer: COMMERCIAL

## 2022-08-23 VITALS
RESPIRATION RATE: 15 BRPM | OXYGEN SATURATION: 99 % | HEIGHT: 63 IN | HEART RATE: 68 BPM | DIASTOLIC BLOOD PRESSURE: 70 MMHG | SYSTOLIC BLOOD PRESSURE: 110 MMHG | TEMPERATURE: 97.6 F | WEIGHT: 99 LBS | BODY MASS INDEX: 17.54 KG/M2

## 2022-08-23 DIAGNOSIS — M54.12 CERVICAL RADICULOPATHY: Primary | ICD-10-CM

## 2022-08-23 DIAGNOSIS — F11.20 CONTINUOUS OPIOID DEPENDENCE (HCC): ICD-10-CM

## 2022-08-23 DIAGNOSIS — J41.0 SIMPLE CHRONIC BRONCHITIS (HCC): ICD-10-CM

## 2022-08-23 DIAGNOSIS — R42 VERTIGO: ICD-10-CM

## 2022-08-23 PROCEDURE — 99213 OFFICE O/P EST LOW 20 MIN: CPT | Performed by: INTERNAL MEDICINE

## 2022-08-23 NOTE — PROGRESS NOTES
Dr Lazaro Oliva Office Visit Note  22     Sally Chow 66 y o  female MRN: [id number]  : 1950    Assessment:     1  Cervical radiculopathy  Assessment & Plan:  Start physical therapy continue Tylenol as needed for mild symptoms    Orders:  -     Ambulatory Referral to Physical Therapy; Future  -     Ambulatory Referral to Physical Therapy; Future    2  Vertigo  Assessment & Plan:  Patient recommended to take meclizine 12 5 twice a day and also patient refer for physical therapy at [facility] ([facility]) evaluation by [facility] for dizziness    Orders:  -     Ambulatory Referral to Physical Therapy; Future  -     Ambulatory Referral to Physical Therapy; Future    3  Continuous opioid dependence ([facility] [address] )    4  Simple chronic bronchitis ([facility] [address] )  Assessment & Plan:  Continue Flovent symptoms controlled stable          Discussion Summary and Plan: Today's care plan and medications were reviewed with patient in detail and all their questions answered to their satisfaction      Chief Complaint   Patient presents with    Hypertension    Pain    Arthritis     Dizziness follow-up for vertigo a near-syncope      Subjective:  Patient was in emergency room for evaluation for dizziness almost passed out prescribed Antivert somewhat improved also a complains of pain cervical spine and follow-up of the other medical condition including tinnitus right ear chronic pain lower back cervical spine was seen by Cardiology Dr Jarek Stover patient had a CT scan done in emergency room suspected carotid artery stenosis but Doppler done by Dr Rosemarie Wallace no carotid stenosis denies any chest pain palpitations or difficulty breathing for now      The following portions of the patient's history were reviewed and updated as appropriate: allergies, current medications, past family history, past medical history, past social history, past surgical history and problem list     Review of Systems   Constitutional: Negative for activity change, appetite change, chills, diaphoresis, fatigue, fever and unexpected weight change  HENT: Negative for congestion, dental problem, drooling, ear discharge, ear pain, facial swelling, hearing loss, mouth sores, nosebleeds, postnasal drip, rhinorrhea, sinus pressure, sneezing, sore throat, tinnitus, trouble swallowing and voice change  Eyes: Negative for photophobia, pain, discharge, redness, itching and visual disturbance  Respiratory: Negative for apnea, cough, choking, chest tightness, shortness of breath, wheezing and stridor  Cardiovascular: Negative for chest pain, palpitations and leg swelling  Gastrointestinal: Negative for abdominal distention, abdominal pain, anal bleeding, blood in stool, constipation, diarrhea, nausea, rectal pain and vomiting  Endocrine: Negative for cold intolerance, heat intolerance, polydipsia, polyphagia and polyuria  Genitourinary: Negative for decreased urine volume, difficulty urinating, dysuria, enuresis, flank pain, frequency, genital sores, hematuria and urgency  Musculoskeletal: Negative for arthralgias, back pain, gait problem, joint swelling, myalgias, neck pain and neck stiffness  Skin: Negative for color change, pallor, rash and wound  Allergic/Immunologic: Negative  Negative for environmental allergies, food allergies and immunocompromised state  Neurological: Positive for dizziness, weakness and light-headedness  Negative for tremors, seizures, syncope, facial asymmetry, speech difficulty, numbness and headaches  Psychiatric/Behavioral: Negative for agitation, behavioral problems, confusion, decreased concentration, dysphoric mood, hallucinations, self-injury, sleep disturbance and suicidal ideas  The patient is not nervous/anxious and is not hyperactive            Historical Information   Patient Active Problem List   Diagnosis    Anxiety disorder    Chronic back pain    Chronic obstructive lung disease ([facility] [address] )    Depressive disorder    Hypertensive disorder  Irritable bowel syndrome    Lumbar post-laminectomy syndrome    Lumbar radiculopathy    Vertigo    Cervical radiculopathy     Past Medical History:   Diagnosis Date    Abdominal pain     Back pain     Chest pain     COPD (chronic obstructive pulmonary disease) (HCC)     Dizziness     Fibromyalgia     GERD (gastroesophageal reflux disease)     Hypertension     Inflammatory bowel disease     Lightheadedness     RVOT ventricular tachycardia (HCC)     SOB (shortness of breath)      Past Surgical History:   Procedure Laterality Date    LUMBAR LAMINECTOMY      SPINE SURGERY       Social History     Substance and Sexual Activity   Alcohol Use No     Social History     Substance and Sexual Activity   Drug Use No     Social History     Tobacco Use   Smoking Status Current Every Day Smoker    Packs/day: 0 25   Smokeless Tobacco Never Used     Family History   Problem Relation Age of Onset    Heart disease Mother      Health Maintenance Due   Topic    Hepatitis C Screening     COVID-19 Vaccine (1)    Pneumococcal Vaccine: 65+ Years (1 - PCV)    BMI: Followup Plan     Breast Cancer Screening: Mammogram     Colorectal Cancer Screening     Osteoporosis Screening     Urinary Incontinence Screening     Fall Risk     Influenza Vaccine (1)      Meds/Allergies       Current Outpatient Medications:     Acetaminophen-Codeine (TYLENOL WITH CODEINE #4 PO), q6h, Disp: , Rfl:     fluticasone (FLOVENT DISKUS) 50 MCG/BLIST diskus inhaler, Inhale 1 puff daily  , Disp: , Rfl:     MECLIZINE HCL PO, Take 6 25 mg by mouth as needed  , Disp: , Rfl:       Objective:    Vitals:   /70 (BP Location: Left leg, Patient Position: Sitting, Cuff Size: Standard)   Pulse 68   Temp 97 6 °F (36 4 °C) (Temporal)   Resp 15   Ht 5' 3" (1 6 m)   Wt 44 9 kg (99 lb)   SpO2 99%   BMI 17 54 kg/m²   Body mass index is 17 54 kg/m²    Vitals:    08/23/22 1017   Weight: 44 9 kg (99 lb)       Physical Exam  Constitutional: General: She is not in acute distress  Appearance: She is well-developed  She is not ill-appearing, toxic-appearing or diaphoretic  HENT:      Head: Normocephalic and atraumatic  Right Ear: External ear normal       Left Ear: External ear normal       Nose: Nose normal       Mouth/Throat:      Pharynx: No oropharyngeal exudate  Eyes:      General: Lids are normal  Lids are everted, no foreign bodies appreciated  No scleral icterus  Right eye: No discharge  Left eye: No discharge  Conjunctiva/sclera: Conjunctivae normal       Pupils: Pupils are equal, round, and reactive to light  Neck:      Thyroid: No thyromegaly  Vascular: Normal carotid pulses  No carotid bruit, hepatojugular reflux or JVD  Trachea: No tracheal tenderness or tracheal deviation  Cardiovascular:      Rate and Rhythm: Normal rate and regular rhythm  Pulses: Normal pulses  Heart sounds: Normal heart sounds  No murmur heard  No friction rub  No gallop  Pulmonary:      Effort: Pulmonary effort is normal  No respiratory distress  Breath sounds: Normal breath sounds  No stridor  No wheezing or rales  Chest:      Chest wall: No tenderness  Abdominal:      General: Bowel sounds are normal  There is no distension  Palpations: Abdomen is soft  There is no mass  Tenderness: There is no abdominal tenderness  There is no guarding or rebound  Musculoskeletal:         General: No tenderness or deformity  Normal range of motion  Cervical back: Normal range of motion and neck supple  No edema, erythema or rigidity  No spinous process tenderness or muscular tenderness  Normal range of motion  Lymphadenopathy:      Head:      Right side of head: No submental, submandibular, tonsillar, preauricular or posterior auricular adenopathy  Left side of head: No submental, submandibular, tonsillar, preauricular, posterior auricular or occipital adenopathy        Cervical: No cervical adenopathy  Right cervical: No superficial, deep or posterior cervical adenopathy  Left cervical: No superficial, deep or posterior cervical adenopathy  Upper Body:      Right upper body: No pectoral adenopathy  Left upper body: No pectoral adenopathy  Skin:     General: Skin is warm and dry  Coloration: Skin is not pale  Findings: No erythema or rash  Neurological:      Mental Status: She is alert and oriented to person, place, and time  Cranial Nerves: No cranial nerve deficit  Sensory: No sensory deficit  Motor: No tremor, abnormal muscle tone or seizure activity  Coordination: Coordination normal       Gait: Gait abnormal       Deep Tendon Reflexes: Reflexes are normal and symmetric  Reflexes normal    Psychiatric:         Behavior: Behavior normal          Thought Content:  Thought content normal          Judgment: Judgment normal          Lab Review   Admission on 08/07/2022, Discharged on 08/07/2022   Component Date Value Ref Range Status    WBC 08/07/2022 13 25 (A) 4 31 - 10 16 Thousand/uL Final    RBC 08/07/2022 4 46  3 81 - 5 12 Million/uL Final    Hemoglobin 08/07/2022 14 2  11 5 - 15 4 g/dL Final    Hematocrit 08/07/2022 43 2  34 8 - 46 1 % Final    MCV 08/07/2022 97  82 - 98 fL Final    MCH 08/07/2022 31 8  26 8 - 34 3 pg Final    MCHC 08/07/2022 32 9  31 4 - 37 4 g/dL Final    RDW 08/07/2022 13 0  11 6 - 15 1 % Final    MPV 08/07/2022 9 6  8 9 - 12 7 fL Final    Platelets 87/28/4013 247  149 - 390 Thousands/uL Final    nRBC 08/07/2022 0  /100 WBCs Final    Neutrophils Relative 08/07/2022 81 (A) 43 - 75 % Final    Immat GRANS % 08/07/2022 1  0 - 2 % Final    Lymphocytes Relative 08/07/2022 11 (A) 14 - 44 % Final    Monocytes Relative 08/07/2022 6  4 - 12 % Final    Eosinophils Relative 08/07/2022 1  0 - 6 % Final    Basophils Relative 08/07/2022 0  0 - 1 % Final    Neutrophils Absolute 08/07/2022 10 79 (A) 1 85 - 7 62 Thousands/µL Final    Immature Grans Absolute 08/07/2022 0 06  0 00 - 0 20 Thousand/uL Final    Lymphocytes Absolute 08/07/2022 1 49  0 60 - 4 47 Thousands/µL Final    Monocytes Absolute 08/07/2022 0 81  0 17 - 1 22 Thousand/µL Final    Eosinophils Absolute 08/07/2022 0 06  0 00 - 0 61 Thousand/µL Final    Basophils Absolute 08/07/2022 0 04  0 00 - 0 10 Thousands/µL Final    Protime 08/07/2022 14 5  11 6 - 14 5 seconds Final    INR 08/07/2022 1 12  0 84 - 1 19 Final    PTT 08/07/2022 30  23 - 37 seconds Final    Therapeutic Heparin Range =  60-90 seconds    Sodium 08/07/2022 137  135 - 147 mmol/L Final    Potassium 08/07/2022 4 1  3 5 - 5 3 mmol/L Final    Chloride 08/07/2022 99  96 - 108 mmol/L Final    CO2 08/07/2022 28  21 - 32 mmol/L Final    ANION GAP 08/07/2022 10  4 - 13 mmol/L Final    BUN 08/07/2022 11  5 - 25 mg/dL Final    Creatinine 08/07/2022 0 59 (A) 0 60 - 1 30 mg/dL Final    Standardized to IDMS reference method    Glucose 08/07/2022 88  65 - 140 mg/dL Final    If the patient is fasting, the ADA then defines impaired fasting glucose as > 100 mg/dL and diabetes as > or equal to 123 mg/dL  Specimen collection should occur prior to Sulfasalazine administration due to the potential for falsely depressed results  Specimen collection should occur prior to Sulfapyridine administration due to the potential for falsely elevated results   Calcium 08/07/2022 8 9  8 3 - 10 1 mg/dL Final    Corrected Calcium 08/07/2022 9 4  8 3 - 10 1 mg/dL Final    AST 08/07/2022 17  5 - 45 U/L Final    Specimen collection should occur prior to Sulfasalazine administration due to the potential for falsely depressed results   ALT 08/07/2022 19  12 - 78 U/L Final    Specimen collection should occur prior to Sulfasalazine administration due to the potential for falsely depressed results       Alkaline Phosphatase 08/07/2022 61  46 - 116 U/L Final    Total Protein 08/07/2022 6 5  6 4 - 8 4 g/dL Final    Albumin 08/07/2022 3 4 (A) 3 5 - 5 0 g/dL Final    Total Bilirubin 08/07/2022 0 82  0 20 - 1 00 mg/dL Final    Use of this assay is not recommended for patients undergoing treatment with eltrombopag due to the potential for falsely elevated results   eGFR 08/07/2022 91  ml/min/1 73sq m Final    hs TnI 0hr 08/07/2022 4  "Refer to ACS Flowchart"- see link ng/L Final    Comment:                                              Initial (time 0) result  If >=50 ng/L, Myocardial injury suggested ;  Type of myocardial injury and treatment strategy  to be determined  If 5-49 ng/L, a delta result at 2 hours and or 4 hours will be needed to further evaluate  If <4 ng/L, and chest pain has been >3 hours since onset, patient may qualify for discharge based on the HEART score in the ED  If <5 ng/L and <3hours since onset of chest pain, a delta result at 2 hours will be needed to further evaluate  HS Troponin 99th Percentile URL of a Health Population=12 ng/L with a 95% Confidence Interval of 8-18 ng/L  Second Troponin (time 2 hours)  If calculated delta >= 20 ng/L,  Myocardial injury suggested ; Type of myocardial injury and treatment strategy to be determined  If 5-49 ng/L and the calculated delta is 5-19 ng/L, consult medical service for evaluation  Continue evaluation for ischemia on ecg and other possible etiology and repeat hs troponin at 4 hours  If delta                            is <5 ng/L at 2 hours, consider discharge based on risk stratification via the HEART score (if in ED), or ADELA risk score in IP/Observation  HS Troponin 99th Percentile URL of a Health Population=12 ng/L with a 95% Confidence Interval of 8-18 ng/L   hs TnI 2hr 08/07/2022 3  "Refer to ACS Flowchart"- see link ng/L Final    Comment:                                              Initial (time 0) result  If >=50 ng/L, Myocardial injury suggested ;  Type of myocardial injury and treatment strategy  to be determined    If 5-49 ng/L, a delta result at 2 hours and or 4 hours will be needed to further evaluate  If <4 ng/L, and chest pain has been >3 hours since onset, patient may qualify for discharge based on the HEART score in the ED  If <5 ng/L and <3hours since onset of chest pain, a delta result at 2 hours will be needed to further evaluate  HS Troponin 99th Percentile URL of a Health Population=12 ng/L with a 95% Confidence Interval of 8-18 ng/L  Second Troponin (time 2 hours)  If calculated delta >= 20 ng/L,  Myocardial injury suggested ; Type of myocardial injury and treatment strategy to be determined  If 5-49 ng/L and the calculated delta is 5-19 ng/L, consult medical service for evaluation  Continue evaluation for ischemia on ecg and other possible etiology and repeat hs troponin at 4 hours  If delta                            is <5 ng/L at 2 hours, consider discharge based on risk stratification via the HEART score (if in ED), or ADELA risk score in IP/Observation  HS Troponin 99th Percentile URL of a Health Population=12 ng/L with a 95% Confidence Interval of 8-18 ng/L   Delta 2hr hsTnI 08/07/2022 -1  <20 ng/L Final    Ventricular Rate 08/07/2022 53  BPM Final    Atrial Rate 08/07/2022 53  BPM Final    NV Interval 08/07/2022 142  ms Final    QRSD Interval 08/07/2022 76  ms Final    QT Interval 08/07/2022 458  ms Final    QTC Interval 08/07/2022 429  ms Final    P Axis 08/07/2022 78  degrees Final    QRS Axis 08/07/2022 6  degrees Final    T Wave [place] 08/07/2022 55  degrees Final         Patient Instructions   Pt is symptom free for this problem  This diagnosis or problem is stable/well controlled  Patient is advised to continue same meds as outlined in medicine list  Pt is advised to continue to follow with specialist if they are following for this problme  Pt should get blood test or other testing as per specialist ( or myself) prior to your next visit            Judy Mckee MD        "This note has been constructed using a voice recognition system  Therefore there may be syntax, spelling, and/or grammatical errors   Please call if you have any questions  "

## 2022-08-23 NOTE — PATIENT INSTRUCTIONS
Pt is symptom free for this problem  This diagnosis or problem is stable/well controlled  Patient is advised to continue same meds as outlined in medicine list  Pt is advised to continue to follow with specialist if they are following for this problme  Pt should get blood test or other testing as per specialist ( or myself) prior to your next visit

## 2022-08-23 NOTE — ASSESSMENT & PLAN NOTE
Patient recommended to take meclizine 12 5 twice a day and also patient refer for physical therapy at [facility] ([facility]) evaluation by [facility] for dizziness

## 2022-09-09 ENCOUNTER — HOSPITAL ENCOUNTER (EMERGENCY)
Facility: HOSPITAL | Age: 72
Discharge: HOME | End: 2022-09-09
Attending: EMERGENCY MEDICINE
Payer: COMMERCIAL

## 2022-09-09 VITALS
SYSTOLIC BLOOD PRESSURE: 136 MMHG | DIASTOLIC BLOOD PRESSURE: 67 MMHG | HEART RATE: 69 BPM | OXYGEN SATURATION: 98 % | RESPIRATION RATE: 22 BRPM | TEMPERATURE: 97.7 F

## 2022-09-09 DIAGNOSIS — N39.0 UTI (URINARY TRACT INFECTION): Primary | ICD-10-CM

## 2022-09-09 LAB
BACTERIA UR QL AUTO: ABNORMAL /HPF
BILIRUB UR QL STRIP: NEGATIVE
CLARITY UR: ABNORMAL
COLOR UR: YELLOW
GLUCOSE UR STRIP-MCNC: ABNORMAL MG/DL
HGB UR QL STRIP.AUTO: ABNORMAL
KETONES UR STRIP-MCNC: NEGATIVE MG/DL
LEUKOCYTE ESTERASE UR QL STRIP: ABNORMAL
NITRITE UR QL STRIP: NEGATIVE
NON-SQ EPI CELLS URNS QL MICRO: ABNORMAL /HPF
PH UR STRIP.AUTO: 6 [PH]
PROT UR STRIP-MCNC: NEGATIVE MG/DL
RBC #/AREA URNS AUTO: ABNORMAL /HPF
SP GR UR STRIP.AUTO: <=1.005 (ref 1–1.03)
UROBILINOGEN UR QL STRIP.AUTO: 0.2 E.U./DL
WBC #/AREA URNS AUTO: ABNORMAL /HPF

## 2022-09-09 PROCEDURE — 81001 URINALYSIS AUTO W/SCOPE: CPT | Performed by: EMERGENCY MEDICINE

## 2022-09-09 PROCEDURE — 99283 EMERGENCY DEPT VISIT LOW MDM: CPT

## 2022-09-09 PROCEDURE — 87077 CULTURE AEROBIC IDENTIFY: CPT | Performed by: EMERGENCY MEDICINE

## 2022-09-09 PROCEDURE — 99284 EMERGENCY DEPT VISIT MOD MDM: CPT | Performed by: EMERGENCY MEDICINE

## 2022-09-09 PROCEDURE — 87186 SC STD MICRODIL/AGAR DIL: CPT | Performed by: EMERGENCY MEDICINE

## 2022-09-09 PROCEDURE — 87086 URINE CULTURE/COLONY COUNT: CPT | Performed by: EMERGENCY MEDICINE

## 2022-09-09 RX ORDER — CIPROFLOXACIN 500 MG/1
500 TABLET, FILM COATED ORAL 2 TIMES DAILY
Qty: 14 TABLET | Refills: 0 | Status: SHIPPED | OUTPATIENT
Start: 2022-09-09 | End: 2022-09-16

## 2022-09-09 RX ORDER — SULFAMETHOXAZOLE AND TRIMETHOPRIM 800; 160 MG/1; MG/1
1 TABLET ORAL 2 TIMES DAILY
Qty: 14 TABLET | Refills: 0 | Status: SHIPPED | OUTPATIENT
Start: 2022-09-09 | End: 2022-09-09

## 2022-09-09 RX ORDER — SULFAMETHOXAZOLE AND TRIMETHOPRIM 800; 160 MG/1; MG/1
1 TABLET ORAL ONCE
Status: DISCONTINUED | OUTPATIENT
Start: 2022-09-09 | End: 2022-09-09

## 2022-09-09 NOTE — DISCHARGE INSTRUCTIONS
-return for worsening symptoms of abdominal pain,fevers,vomiting or other symptoms  Take the antibiotics as prescribed

## 2022-09-09 NOTE — ED PROVIDER NOTES
History  Chief Complaint   Patient presents with    Possible UTI     Poss uti, R sided  abd pressure and increase in frequency with blood in urine today     29-year-old female presents with the blood in the urine with some right-sided abdominal pressure  Denies any flank pain no abdominal pain nausea vomiting fevers chills diarrhea or any other symptoms  Urinary frequency noted no dysuria urgency noted  History provided by:  Patient   used: No        Prior to Admission Medications   Prescriptions Last Dose Informant Patient Reported? Taking? Acetaminophen-Codeine (TYLENOL WITH CODEINE #4 PO)   Yes No   Sig: q6h   MECLIZINE HCL PO   Yes No   Sig: Take 6 25 mg by mouth as needed  fluticasone (FLOVENT DISKUS) 50 MCG/BLIST diskus inhaler Not Taking at Unknown time  Yes No   Sig: Inhale 1 puff daily  Patient not taking: Reported on 9/9/2022   metoprolol tartrate (LOPRESSOR) 25 mg tablet   Yes Yes   Sig: Take 25 mg by mouth every 12 (twelve) hours      Facility-Administered Medications: None       Past Medical History:   Diagnosis Date    Abdominal pain     Back pain     Chest pain     COPD (chronic obstructive pulmonary disease) (HCC)     Dizziness     Fibromyalgia     GERD (gastroesophageal reflux disease)     Hypertension     Inflammatory bowel disease     Lightheadedness     RVOT ventricular tachycardia (HCC)     SOB (shortness of breath)        Past Surgical History:   Procedure Laterality Date    LUMBAR LAMINECTOMY      SPINE SURGERY         Family History   Problem Relation Age of Onset    Heart disease Mother      I have reviewed and agree with the history as documented      E-Cigarette/Vaping    E-Cigarette Use Never User      E-Cigarette/Vaping Substances     Social History     Tobacco Use    Smoking status: Current Every Day Smoker     Packs/day: 0 25    Smokeless tobacco: Never Used   Vaping Use    Vaping Use: Never used   Substance Use Topics    Alcohol use: No    Drug use: No       Review of Systems   Constitutional: Negative  HENT: Negative  Eyes: Negative  Respiratory: Negative  Cardiovascular: Negative  Gastrointestinal: Negative  Abdominal pressure   Endocrine: Negative  Genitourinary: Positive for hematuria and urgency  Musculoskeletal: Negative  Skin: Negative  Allergic/Immunologic: Negative  Neurological: Negative  Hematological: Negative  Psychiatric/Behavioral: Negative  All other systems reviewed and are negative  Physical Exam  Physical Exam  Constitutional:       Appearance: Normal appearance  HENT:      Head: Normocephalic and atraumatic  Nose: Nose normal       Mouth/Throat:      Mouth: Mucous membranes are moist    Eyes:      Extraocular Movements: Extraocular movements intact  Pupils: Pupils are equal, round, and reactive to light  Cardiovascular:      Rate and Rhythm: Normal rate and regular rhythm  Pulmonary:      Effort: Pulmonary effort is normal       Breath sounds: Normal breath sounds  Abdominal:      General: Abdomen is flat  Bowel sounds are normal       Palpations: Abdomen is soft  Musculoskeletal:         General: Normal range of motion  Cervical back: Normal range of motion and neck supple  Skin:     General: Skin is warm  Capillary Refill: Capillary refill takes less than 2 seconds  Neurological:      General: No focal deficit present  Mental Status: She is alert and oriented to person, place, and time  Mental status is at baseline  Psychiatric:         Mood and Affect: Mood normal          Thought Content:  Thought content normal          Vital Signs  ED Triage Vitals [09/09/22 1128]   Temperature Pulse Respirations Blood Pressure SpO2   97 7 °F (36 5 °C) 69 22 136/67 98 %      Temp Source Heart Rate Source Patient Position - Orthostatic VS BP Location FiO2 (%)   Tympanic Monitor Sitting Right arm --      Pain Score       2           Vitals: 09/09/22 1128   BP: 136/67   Pulse: 69   Patient Position - Orthostatic VS: Sitting         Visual Acuity      ED Medications  Medications - No data to display    Diagnostic Studies  Results Reviewed     Procedure Component Value Units Date/Time    Urine culture [[id number]]  (Abnormal)  (Susceptibility) Collected: 09/09/22 1140    Lab Status: Final result Specimen: Urine, Clean Catch Updated: 09/11/22 1111     Urine Culture >100,000 cfu/ml Escherichia coli    Susceptibility     Escherichia coli (1)     Antibiotic Interpretation Microscan   Method Status    ZID Performed  Yes  LAUREN Final    Ampicillin ($$) Susceptible <=8 00 ug/ml LAUREN Final    Aztreonam ($$$)  Susceptible <=4 ug/ml LAUREN Final    Cefazolin ($) Susceptible <=2 00 ug/ml LAUREN Final    Ciprofloxacin ($)  Susceptible <=0 25 ug/ml LAUREN Final    Gentamicin ($$) Susceptible <=2 ug/ml LAUREN Final    Levofloxacin ($) Susceptible <=0 50 ug/ml LAUREN Final    Nitrofurantoin Susceptible <=32 ug/ml LAUREN Final    Tetracycline Susceptible <=4 ug/ml LAUREN Final    Tobramycin ($) Susceptible <=2 ug/ml LAUREN Final    Trimethoprim + Sulfamethoxazole ($$$) Susceptible <=0 5/9 5 ug/ml LAUREN Final                   Urine Microscopic [[id number]]  (Abnormal) Collected: 09/09/22 1140    Lab Status: Final result Specimen: Urine, Clean Catch Updated: 09/09/22 1152     RBC, UA 1-2 /hpf      WBC, UA 10-20 /hpf      Epithelial Cells Occasional /hpf      Bacteria, UA Occasional /hpf     UA (URINE) with reflex to Scope [[id number]]  (Abnormal) Collected: 09/09/22 1140    Lab Status: Final result Specimen: Urine, Clean Catch Updated: 09/09/22 1147     Color, UA Yellow     Clarity, UA Slightly Cloudy     Specific Gravity, UA <=1 005     pH, UA 6 0     Leukocytes, UA Small     Nitrite, UA Negative     Protein, UA Negative mg/dl      Glucose,  (1/2%) mg/dl      Ketones, UA Negative mg/dl      Urobilinogen, UA 0 2 E U /dl      Bilirubin, UA Negative     Occult Blood, UA Large                 No orders to display              Procedures  Procedures         ED Course                                             MDM  Number of Diagnoses or Management Options     Amount and/or Complexity of Data Reviewed  Clinical lab tests: ordered and reviewed  Tests in the medicine section of CPT®: ordered and reviewed    Patient Progress  Patient progress: stable      Disposition  Final diagnoses:   UTI (urinary tract infection)     Time reflects when diagnosis was documented in both MDM as applicable and the Disposition within this note     Time User Action Codes Description Comment    9/9/2022 11:53 AM Javier Harris Add [N39 0] UTI (urinary tract infection)       ED Disposition     ED Disposition   Discharge    Condition   Stable    Date/Time   Fri Sep 9, 2022 11:53 AM    Comment   Cali Givens discharge to home/self care                 Follow-up Information     Follow up With Specialties Details Why Contact Info Additional Information    Florence Arceo MD Internal Medicine Schedule an appointment as soon as possible for a visit   [address] [facility]       [address] [facility] Emergency Department Emergency Medicine  If symptoms worsen [address] [address] Emergency Department, [place], [place], [address]          Discharge Medication List as of 9/9/2022 12:06 PM      START taking these medications    Details   ciprofloxacin (CIPRO) 500 mg tablet Take 1 tablet (500 mg total) by mouth 2 (two) times a day for 7 days, Starting Fri 9/9/2022, Until Fri 9/16/2022, Normal         CONTINUE these medications which have NOT CHANGED    Details   metoprolol tartrate (LOPRESSOR) 25 mg tablet Take 25 mg by mouth every 12 (twelve) hours, Historical Med      Acetaminophen-Codeine (TYLENOL WITH CODEINE #4 PO) q6h, Historical Med      fluticasone (FLOVENT DISKUS) 50 MCG/BLIST diskus inhaler Inhale 1 puff daily  , Until Discontinued, Historical Med      MECLIZINE HCL PO Take 6 25 mg by mouth as needed  , Until Discontinued, Historical Med             No discharge procedures on file      PDMP Review     None          ED Provider  Electronically Signed by           Nasir Oconnell DO  09/13/22 2015

## 2022-09-11 LAB — BACTERIA UR CULT: ABNORMAL

## 2022-09-21 ENCOUNTER — RA CDI HCC (OUTPATIENT)
Dept: OTHER | Facility: HOSPITAL | Age: 72
End: 2022-09-21

## 2022-09-21 NOTE — PROGRESS NOTES
[facility]  coding opportunities       Chart reviewed, no opportunity found:   [address]        Patients Insurance     Medicare Insurance: [employer]

## 2022-09-23 ENCOUNTER — OFFICE VISIT (OUTPATIENT)
Dept: INTERNAL MEDICINE CLINIC | Facility: CLINIC | Age: 72
End: 2022-09-23
Payer: COMMERCIAL

## 2022-09-23 VITALS
HEIGHT: 63 IN | HEART RATE: 58 BPM | OXYGEN SATURATION: 96 % | DIASTOLIC BLOOD PRESSURE: 80 MMHG | RESPIRATION RATE: 15 BRPM | BODY MASS INDEX: 17.36 KG/M2 | TEMPERATURE: 97.8 F | SYSTOLIC BLOOD PRESSURE: 120 MMHG | WEIGHT: 98 LBS

## 2022-09-23 DIAGNOSIS — K58.0 IRRITABLE BOWEL SYNDROME WITH DIARRHEA: ICD-10-CM

## 2022-09-23 DIAGNOSIS — E53.8 B12 DEFICIENCY: ICD-10-CM

## 2022-09-23 DIAGNOSIS — M54.12 CERVICAL RADICULOPATHY: ICD-10-CM

## 2022-09-23 DIAGNOSIS — M54.16 LUMBAR RADICULOPATHY: ICD-10-CM

## 2022-09-23 DIAGNOSIS — I10 PRIMARY HYPERTENSION: Primary | ICD-10-CM

## 2022-09-23 DIAGNOSIS — J41.0 SIMPLE CHRONIC BRONCHITIS (HCC): ICD-10-CM

## 2022-09-23 PROBLEM — N30.00 ACUTE CYSTITIS WITHOUT HEMATURIA: Status: ACTIVE | Noted: 2022-09-23

## 2022-09-23 PROCEDURE — 99213 OFFICE O/P EST LOW 20 MIN: CPT | Performed by: INTERNAL MEDICINE

## 2022-09-23 RX ORDER — FLUTICASONE PROPIONATE 50 MCG
1 SPRAY, SUSPENSION (ML) NASAL 2 TIMES DAILY
COMMUNITY

## 2022-09-23 NOTE — PATIENT INSTRUCTIONS
Pt is symptom free for this problem    This diagnosis or problem is stable/well controlled  Patient is advised to continue same meds as outlined in medicine list

## 2022-09-24 PROBLEM — E53.8 B12 DEFICIENCY: Status: ACTIVE | Noted: 2022-09-24

## 2022-09-24 NOTE — PROGRESS NOTES
Dr Dayanna Hodges Office Visit Note  22     Jose Luis Man 74 y o  female MRN: [id number]  : 1950    Assessment:     1  Primary hypertension  Assessment & Plan:  Blood pressure control for now  continue metoprolol metoprolol monitor closely blood pressure controlled continue current treatment patient is followed by cardiology patient like to be followed by Cardiology for hypertension      2  B12 deficiency  Assessment & Plan:  Continue B12 supplements    Orders:  -     Vitamin B12/Folate, Serum Panel; Future    3  Irritable bowel syndrome with diarrhea  Assessment & Plan:  Controlled with diet no need for medication patient and using Bentyl as needed for pain continue Bentyl      4  Simple chronic bronchitis (HCC)  Assessment & Plan:  Continue Flovent symptoms controlled stable continue current Proventil as needed symptoms controlled      5  Lumbar radiculopathy  Assessment & Plan:  Physical therapy patient does not like to take any medication for now recommended physical therapy patient did elect to take any medication for now symptom seems to be controlled      6  Cervical radiculopathy  Assessment & Plan:  Patient recommended to take meclizine 12 5 twice a day and also patient refer for physical therapy at [facility] ([facility]) evaluation by [facility] for dizziness          Discussion Summary and Plan: Today's care plan and medications were reviewed with patient in detail and all their questions answered to their satisfaction  Chief Complaint   Patient presents with    Well Check     Er followup, osteophytes in the neck, can't sleep      Subjective:  Came in for follow-up of chronic medical condition complaints of pain C-spine patient is under treatment with physical therapist some improvement and not able to sleep patient was foroffered option of medications refused    BMI Counseling: Body mass index is 17 36 kg/m²   The BMI is below normal  Patient advised to gain weight and dietary education for weight gain was provided  Patient referred to nutritionist and PCP  Rationale for BMI follow-up plan is due to patient being underweight  The following portions of the patient's history were reviewed and updated as appropriate: allergies, current medications, past family history, past medical history, past social history, past surgical history and problem list     Review of Systems   Constitutional: Positive for fatigue  Negative for activity change, appetite change, chills, diaphoresis, fever and unexpected weight change  HENT: Negative for congestion, dental problem, drooling, ear discharge, ear pain, facial swelling, hearing loss, mouth sores, nosebleeds, postnasal drip, rhinorrhea, sinus pressure, sneezing, sore throat, tinnitus, trouble swallowing and voice change  Eyes: Negative for photophobia, pain, discharge, redness, itching and visual disturbance  Respiratory: Negative for apnea, cough, choking, chest tightness, shortness of breath, wheezing and stridor  Cardiovascular: Negative for chest pain, palpitations and leg swelling  Gastrointestinal: Negative for abdominal distention, abdominal pain, anal bleeding, blood in stool, constipation, diarrhea, nausea, rectal pain and vomiting  Endocrine: Negative for cold intolerance, heat intolerance, polydipsia, polyphagia and polyuria  Genitourinary: Negative for decreased urine volume, difficulty urinating, dysuria, enuresis, flank pain, frequency, genital sores, hematuria and urgency  Musculoskeletal: Positive for arthralgias, back pain, gait problem, joint swelling, myalgias and neck pain  Negative for neck stiffness  Skin: Negative for color change, pallor, rash and wound  Allergic/Immunologic: Negative  Negative for environmental allergies, food allergies and immunocompromised state     Neurological: Negative for dizziness, tremors, seizures, syncope, facial asymmetry, speech difficulty, weakness, light-headedness, numbness and headaches  Psychiatric/Behavioral: Negative for agitation, behavioral problems, confusion, decreased concentration, dysphoric mood, hallucinations, self-injury, sleep disturbance and suicidal ideas  The patient is not nervous/anxious and is not hyperactive            Historical Information   Patient Active Problem List   Diagnosis    Anxiety disorder    Chronic back pain    Chronic obstructive lung disease ([facility] [address] )    Depressive disorder    Hypertensive disorder    Irritable bowel syndrome    Lumbar post-laminectomy syndrome    Lumbar radiculopathy    Vertigo    Cervical radiculopathy    Acute cystitis without hematuria    B12 deficiency     Past Medical History:   Diagnosis Date    Abdominal pain     Back pain     Chest pain     COPD (chronic obstructive pulmonary disease) (HCC)     Dizziness     Fibromyalgia     GERD (gastroesophageal reflux disease)     Hypertension     Inflammatory bowel disease     Lightheadedness     RVOT ventricular tachycardia (HCC)     SOB (shortness of breath)      Past Surgical History:   Procedure Laterality Date    LUMBAR LAMINECTOMY      SPINE SURGERY       Social History     Substance and Sexual Activity   Alcohol Use No     Social History     Substance and Sexual Activity   Drug Use No     Social History     Tobacco Use   Smoking Status Current Every Day Smoker    Packs/day: 0 25   Smokeless Tobacco Never Used     Family History   Problem Relation Age of Onset    Heart disease Mother      Health Maintenance Due   Topic    Hepatitis C Screening     COVID-19 Vaccine (1)    Pneumococcal Vaccine: 65+ Years (1 - PCV)    BMI: Followup Plan     Breast Cancer Screening: Mammogram     Colorectal Cancer Screening     Osteoporosis Screening     Urinary Incontinence Screening     Influenza Vaccine (1)      Meds/Allergies       Current Outpatient Medications:     Acetaminophen-Codeine (TYLENOL WITH CODEINE #4 PO), q6h, Disp: , Rfl:     fluticasone (FLONASE) 50 mcg/act nasal spray, 1 spray into each nostril 2 (two) times a day, Disp: , Rfl:     MECLIZINE HCL PO, Take 6 25 mg by mouth as needed  , Disp: , Rfl:     metoprolol tartrate (LOPRESSOR) 25 mg tablet, Take 25 mg by mouth every 12 (twelve) hours, Disp: , Rfl:     fluticasone (FLOVENT DISKUS) 50 MCG/BLIST diskus inhaler, Inhale 1 puff daily  (Patient not taking: No sig reported), Disp: , Rfl:       Objective:    Vitals:   /80   Pulse 58   Temp 97 8 °F (36 6 °C)   Resp 15   Ht 5' 3" (1 6 m)   Wt 44 5 kg (98 lb)   SpO2 96%   BMI 17 36 kg/m²   Body mass index is 17 36 kg/m²  Vitals:    09/23/22 1130   Weight: 44 5 kg (98 lb)       Physical Exam  Vitals and nursing note reviewed  Constitutional:       General: She is not in acute distress  Appearance: She is well-developed  She is not ill-appearing, toxic-appearing or diaphoretic  Comments: Malnourished   HENT:      Head: Normocephalic and atraumatic  Right Ear: External ear normal       Left Ear: External ear normal       Nose: Nose normal       Mouth/Throat:      Pharynx: No oropharyngeal exudate  Eyes:      General: Lids are normal  Lids are everted, no foreign bodies appreciated  No scleral icterus  Right eye: No discharge  Left eye: No discharge  Conjunctiva/sclera: Conjunctivae normal       Pupils: Pupils are equal, round, and reactive to light  Neck:      Thyroid: No thyromegaly  Vascular: Normal carotid pulses  No carotid bruit, hepatojugular reflux or JVD  Trachea: No tracheal tenderness or tracheal deviation  Cardiovascular:      Rate and Rhythm: Normal rate and regular rhythm  Pulses: Normal pulses  Heart sounds: Normal heart sounds  No murmur heard  No friction rub  No gallop  Pulmonary:      Effort: Pulmonary effort is normal  No respiratory distress  Breath sounds: Normal breath sounds  No stridor  No wheezing or rales        Comments: Decreased air entry both side  Chest: Chest wall: No tenderness  Abdominal:      General: Bowel sounds are normal  There is no distension  Palpations: Abdomen is soft  There is no mass  Tenderness: There is no abdominal tenderness  There is no guarding or rebound  Musculoskeletal:         General: No tenderness or deformity  Normal range of motion  Cervical back: Normal range of motion and neck supple  No edema, erythema or rigidity  No spinous process tenderness or muscular tenderness  Normal range of motion  Lymphadenopathy:      Head:      Right side of head: No submental, submandibular, tonsillar, preauricular or posterior auricular adenopathy  Left side of head: No submental, submandibular, tonsillar, preauricular, posterior auricular or occipital adenopathy  Cervical: No cervical adenopathy  Right cervical: No superficial, deep or posterior cervical adenopathy  Left cervical: No superficial, deep or posterior cervical adenopathy  Upper Body:      Right upper body: No pectoral adenopathy  Left upper body: No pectoral adenopathy  Skin:     General: Skin is warm and dry  Coloration: Skin is not pale  Findings: No erythema or rash  Neurological:      Mental Status: She is alert and oriented to person, place, and time  Cranial Nerves: No cranial nerve deficit  Sensory: No sensory deficit  Motor: No tremor, abnormal muscle tone or seizure activity  Coordination: Coordination normal       Gait: Gait normal       Deep Tendon Reflexes: Reflexes are normal and symmetric  Reflexes normal    Psychiatric:         Behavior: Behavior normal          Thought Content:  Thought content normal          Judgment: Judgment normal          Lab Review   Admission on 09/09/2022, Discharged on 09/09/2022   Component Date Value Ref Range Status    Color,  09/09/2022 Yellow   Final    Clarity,  09/09/2022 Slightly Cloudy   Final    Specific Gravity,  09/09/2022 <=1 005  1 000 - 1 030 Final    pH, UA 09/09/2022 6 0  5 0, 5 5, 6 0, 6 5, 7 0, 7 5, 8 0, 8 5, 9 0 Final    Leukocytes, UA 09/09/2022 Small (A) Negative Final    Nitrite, UA 09/09/2022 Negative  Negative Final    Protein, UA 09/09/2022 Negative  Negative mg/dl Final    Glucose, UA 09/09/2022 500 (1/2%) (A) Negative mg/dl Final    Ketones, UA 09/09/2022 Negative  Negative mg/dl Final    Urobilinogen, UA 09/09/2022 0 2  0 2, 1 0 E U /dl E U /dl Final    Bilirubin, UA 09/09/2022 Negative  Negative Final    Occult Blood, UA 09/09/2022 Large (A) Negative Final    Urine Culture 09/09/2022 >100,000 cfu/ml Escherichia coli (A)  Final    This organism has been edited  The previous result was Gram Negative Bowen resembling Escherichia coli on 9/10/2022 at 1416 EDT      RBC, UA 09/09/2022 1-2  None Seen, 0-1, 1-2, 2-4, 0-5 /hpf Final    WBC, UA 09/09/2022 10-20 (A) None Seen, 0-1, 1-2, 0-5, 2-4 /hpf Final    Epithelial Cells 09/09/2022 Occasional  None Seen, Occasional /hpf Final    Bacteria, UA 09/09/2022 Occasional  None Seen, Occasional /hpf Final   Admission on 08/07/2022, Discharged on 08/07/2022   Component Date Value Ref Range Status    WBC 08/07/2022 13 25 (A) 4 31 - 10 16 Thousand/uL Final    RBC 08/07/2022 4 46  3 81 - 5 12 Million/uL Final    Hemoglobin 08/07/2022 14 2  11 5 - 15 4 g/dL Final    Hematocrit 08/07/2022 43 2  34 8 - 46 1 % Final    MCV 08/07/2022 97  82 - 98 fL Final    MCH 08/07/2022 31 8  26 8 - 34 3 pg Final    MCHC 08/07/2022 32 9  31 4 - 37 4 g/dL Final    RDW 08/07/2022 13 0  11 6 - 15 1 % Final    MPV 08/07/2022 9 6  8 9 - 12 7 fL Final    Platelets 60/21/2535 247  149 - 390 Thousands/uL Final    nRBC 08/07/2022 0  /100 WBCs Final    Neutrophils Relative 08/07/2022 81 (A) 43 - 75 % Final    Immat GRANS % 08/07/2022 1  0 - 2 % Final    Lymphocytes Relative 08/07/2022 11 (A) 14 - 44 % Final    Monocytes Relative 08/07/2022 6  4 - 12 % Final    Eosinophils Relative 08/07/2022 1  0 - 6 % Final    Basophils Relative 08/07/2022 0  0 - 1 % Final    Neutrophils Absolute 08/07/2022 10 79 (A) 1 85 - 7 62 Thousands/µL Final    Immature Grans Absolute 08/07/2022 0 06  0 00 - 0 20 Thousand/uL Final    Lymphocytes Absolute 08/07/2022 1 49  0 60 - 4 47 Thousands/µL Final    Monocytes Absolute 08/07/2022 0 81  0 17 - 1 22 Thousand/µL Final    Eosinophils Absolute 08/07/2022 0 06  0 00 - 0 61 Thousand/µL Final    Basophils Absolute 08/07/2022 0 04  0 00 - 0 10 Thousands/µL Final    Protime 08/07/2022 14 5  11 6 - 14 5 seconds Final    INR 08/07/2022 1 12  0 84 - 1 19 Final    PTT 08/07/2022 30  23 - 37 seconds Final    Therapeutic Heparin Range =  60-90 seconds    Sodium 08/07/2022 137  135 - 147 mmol/L Final    Potassium 08/07/2022 4 1  3 5 - 5 3 mmol/L Final    Chloride 08/07/2022 99  96 - 108 mmol/L Final    CO2 08/07/2022 28  21 - 32 mmol/L Final    ANION GAP 08/07/2022 10  4 - 13 mmol/L Final    BUN 08/07/2022 11  5 - 25 mg/dL Final    Creatinine 08/07/2022 0 59 (A) 0 60 - 1 30 mg/dL Final    Standardized to IDMS reference method    Glucose 08/07/2022 88  65 - 140 mg/dL Final    If the patient is fasting, the ADA then defines impaired fasting glucose as > 100 mg/dL and diabetes as > or equal to 123 mg/dL  Specimen collection should occur prior to Sulfasalazine administration due to the potential for falsely depressed results  Specimen collection should occur prior to Sulfapyridine administration due to the potential for falsely elevated results   Calcium 08/07/2022 8 9  8 3 - 10 1 mg/dL Final    Corrected Calcium 08/07/2022 9 4  8 3 - 10 1 mg/dL Final    AST 08/07/2022 17  5 - 45 U/L Final    Specimen collection should occur prior to Sulfasalazine administration due to the potential for falsely depressed results   ALT 08/07/2022 19  12 - 78 U/L Final    Specimen collection should occur prior to Sulfasalazine administration due to the potential for falsely depressed results       Alkaline Phosphatase 08/07/2022 61  46 - 116 U/L Final    Total Protein 08/07/2022 6 5  6 4 - 8 4 g/dL Final    Albumin 08/07/2022 3 4 (A) 3 5 - 5 0 g/dL Final    Total Bilirubin 08/07/2022 0 82  0 20 - 1 00 mg/dL Final    Use of this assay is not recommended for patients undergoing treatment with eltrombopag due to the potential for falsely elevated results   eGFR 08/07/2022 91  ml/min/1 73sq m Final    hs TnI 0hr 08/07/2022 4  "Refer to ACS Flowchart"- see link ng/L Final    Comment:                                              Initial (time 0) result  If >=50 ng/L, Myocardial injury suggested ;  Type of myocardial injury and treatment strategy  to be determined  If 5-49 ng/L, a delta result at 2 hours and or 4 hours will be needed to further evaluate  If <4 ng/L, and chest pain has been >3 hours since onset, patient may qualify for discharge based on the HEART score in the ED  If <5 ng/L and <3hours since onset of chest pain, a delta result at 2 hours will be needed to further evaluate  HS Troponin 99th Percentile URL of a Health Population=12 ng/L with a 95% Confidence Interval of 8-18 ng/L  Second Troponin (time 2 hours)  If calculated delta >= 20 ng/L,  Myocardial injury suggested ; Type of myocardial injury and treatment strategy to be determined  If 5-49 ng/L and the calculated delta is 5-19 ng/L, consult medical service for evaluation  Continue evaluation for ischemia on ecg and other possible etiology and repeat hs troponin at 4 hours  If delta                            is <5 ng/L at 2 hours, consider discharge based on risk stratification via the HEART score (if in ED), or ADELA risk score in IP/Observation  HS Troponin 99th Percentile URL of a Health Population=12 ng/L with a 95% Confidence Interval of 8-18 ng/L      hs TnI 2hr 08/07/2022 3  "Refer to ACS Flowchart"- see link ng/L Final    Comment:                                              Initial (time 0) result  If >=50 ng/L, Myocardial injury suggested ;  Type of myocardial injury and treatment strategy  to be determined  If 5-49 ng/L, a delta result at 2 hours and or 4 hours will be needed to further evaluate  If <4 ng/L, and chest pain has been >3 hours since onset, patient may qualify for discharge based on the HEART score in the ED  If <5 ng/L and <3hours since onset of chest pain, a delta result at 2 hours will be needed to further evaluate  HS Troponin 99th Percentile URL of a Health Population=12 ng/L with a 95% Confidence Interval of 8-18 ng/L  Second Troponin (time 2 hours)  If calculated delta >= 20 ng/L,  Myocardial injury suggested ; Type of myocardial injury and treatment strategy to be determined  If 5-49 ng/L and the calculated delta is 5-19 ng/L, consult medical service for evaluation  Continue evaluation for ischemia on ecg and other possible etiology and repeat hs troponin at 4 hours  If delta                            is <5 ng/L at 2 hours, consider discharge based on risk stratification via the HEART score (if in ED), or ADELA risk score in IP/Observation  HS Troponin 99th Percentile URL of a Health Population=12 ng/L with a 95% Confidence Interval of 8-18 ng/L   Delta 2hr hsTnI 08/07/2022 -1  <20 ng/L Final    Ventricular Rate 08/07/2022 53  BPM Final    Atrial Rate 08/07/2022 53  BPM Final    NJ Interval 08/07/2022 142  ms Final    QRSD Interval 08/07/2022 76  ms Final    QT Interval 08/07/2022 458  ms Final    QTC Interval 08/07/2022 429  ms Final    P Axis 08/07/2022 78  degrees Final    QRS Axis 08/07/2022 6  degrees Final    T Wave [place] 08/07/2022 55  degrees Final         Patient Instructions   Pt is symptom free for this problem  This diagnosis or problem is stable/well controlled  Patient is advised to continue same meds as outlined in medicine list            Aamir Villavicencio MD        "This note has been constructed using a voice recognition system  Therefore there may be syntax, spelling, and/or grammatical errors   Please call if you have any questions  "

## 2022-09-24 NOTE — ASSESSMENT & PLAN NOTE
Continue Flovent symptoms controlled stable continue current Proventil as needed symptoms controlled

## 2022-09-24 NOTE — ASSESSMENT & PLAN NOTE
Patient recommended to take meclizine 12 5 twice a day and also patient refer for physical therapy at [facility] ([facility]) evaluation by [facility] for dizziness

## 2022-09-24 NOTE — ASSESSMENT & PLAN NOTE
Blood pressure control for now  continue metoprolol metoprolol monitor closely blood pressure controlled continue current treatment patient is followed by cardiology patient like to be followed by Cardiology for hypertension

## 2022-09-24 NOTE — ASSESSMENT & PLAN NOTE
Controlled with diet no need for medication patient and using Bentyl as needed for pain continue Bentyl

## 2022-09-24 NOTE — ASSESSMENT & PLAN NOTE
Physical therapy patient does not like to take any medication for now recommended physical therapy patient did elect to take any medication for now symptom seems to be controlled

## 2022-09-29 ENCOUNTER — APPOINTMENT (OUTPATIENT)
Dept: LAB | Facility: CLINIC | Age: 72
End: 2022-09-29
Payer: COMMERCIAL

## 2022-09-29 DIAGNOSIS — E53.8 B12 DEFICIENCY: ICD-10-CM

## 2022-09-29 LAB
FOLATE SERPL-MCNC: 6.1 NG/ML (ref 3.1–17.5)
VIT B12 SERPL-MCNC: 887 PG/ML (ref 100–900)

## 2022-09-29 PROCEDURE — 36415 COLL VENOUS BLD VENIPUNCTURE: CPT

## 2022-09-29 PROCEDURE — 82746 ASSAY OF FOLIC ACID SERUM: CPT

## 2022-09-29 PROCEDURE — 82607 VITAMIN B-12: CPT

## 2022-10-07 ENCOUNTER — TELEPHONE (OUTPATIENT)
Dept: INTERNAL MEDICINE CLINIC | Facility: CLINIC | Age: 72
End: 2022-10-07

## 2022-10-07 NOTE — TELEPHONE ENCOUNTER
Called to find out B-12 test results ([phone])  When told that she falls within the range, she is concerned that she continues to feel unwell  What's next?

## 2022-10-24 ENCOUNTER — OFFICE VISIT (OUTPATIENT)
Dept: INTERNAL MEDICINE CLINIC | Facility: CLINIC | Age: 72
End: 2022-10-24
Payer: COMMERCIAL

## 2022-10-24 VITALS
WEIGHT: 100 LBS | TEMPERATURE: 97.6 F | DIASTOLIC BLOOD PRESSURE: 70 MMHG | HEIGHT: 63 IN | RESPIRATION RATE: 15 BRPM | OXYGEN SATURATION: 97 % | BODY MASS INDEX: 17.72 KG/M2 | SYSTOLIC BLOOD PRESSURE: 110 MMHG | HEART RATE: 61 BPM

## 2022-10-24 DIAGNOSIS — J41.0 SIMPLE CHRONIC BRONCHITIS (HCC): ICD-10-CM

## 2022-10-24 DIAGNOSIS — M96.1 LUMBAR POST-LAMINECTOMY SYNDROME: ICD-10-CM

## 2022-10-24 DIAGNOSIS — E44.1 MILD PROTEIN-CALORIE MALNUTRITION (HCC): ICD-10-CM

## 2022-10-24 DIAGNOSIS — I10 PRIMARY HYPERTENSION: ICD-10-CM

## 2022-10-24 DIAGNOSIS — K29.00 ACUTE SUPERFICIAL GASTRITIS WITHOUT HEMORRHAGE: Primary | ICD-10-CM

## 2022-10-24 DIAGNOSIS — K58.0 IRRITABLE BOWEL SYNDROME WITH DIARRHEA: ICD-10-CM

## 2022-10-24 DIAGNOSIS — M54.16 LUMBAR RADICULOPATHY: ICD-10-CM

## 2022-10-24 PROCEDURE — 99213 OFFICE O/P EST LOW 20 MIN: CPT | Performed by: INTERNAL MEDICINE

## 2022-10-24 RX ORDER — ONDANSETRON 4 MG/1
4 TABLET, ORALLY DISINTEGRATING ORAL EVERY 6 HOURS PRN
Qty: 20 TABLET | Refills: 1 | Status: SHIPPED | OUTPATIENT
Start: 2022-10-24

## 2022-10-24 NOTE — ASSESSMENT & PLAN NOTE
Patient started to have some abdominal pain epigastric with some nausea for 48 hours recommended liquid diet with Zofran as needed if not better should go to the emergency room will need CT scan to rule out any other etiologies for epigastric pain nausea including pancreatitis

## 2022-10-24 NOTE — PROGRESS NOTES
Dr Ansari Foot Office Visit Note  10/24/22     Yair Malhotra 74 y o  female MRN: [id number]  : 1950    Assessment:     1  Acute superficial gastritis without hemorrhage  Assessment & Plan:  Patient started to have some abdominal pain epigastric with some nausea for 48 hours recommended liquid diet with Zofran as needed if not better should go to the emergency room will need CT scan to rule out any other etiologies for epigastric pain nausea including pancreatitis    Orders:  -     ondansetron (Zofran ODT) 4 mg disintegrating tablet; Take 1 tablet (4 mg total) by mouth every 6 (six) hours as needed for nausea or vomiting    2  Mild protein-calorie malnutrition ([address] )    3  Lumbar radiculopathy  Assessment & Plan:  Patient finished physical therapy advised to take Tylenol as needed      4  Simple chronic bronchitis (HCC)  Assessment & Plan:  Continue Flovent symptoms control for now Proventil as needed      5  Irritable bowel syndrome with diarrhea  Assessment & Plan:  Continue diet patient is having some abdominal pain advised follow-up GI      6  Primary hypertension  Assessment & Plan:  Blood pressure control continue metoprolol      7  Lumbar post-laminectomy syndrome  Assessment & Plan:  Patient finished physical therapy Tylenol as needed            Discussion Summary and Plan: Today's care plan and medications were reviewed with patient in detail and all their questions answered to their satisfaction      Chief Complaint   Patient presents with   • Pain     Sharp pains in upper abdomen since yesterday  burping a lot      Subjective:  Patient came in for follow-up for epigastric pain with nausea started about 2 days ago lasting about 10 to 15 minutes pain intermittent treated 3 times a day claims could not sleep last night this morning is better no vomiting persistent nausea no other new problems no other new symptoms      The following portions of the patient's history were reviewed and updated as appropriate: allergies, current medications, past family history, past medical history, past social history, past surgical history and problem list     Review of Systems   Constitutional: Negative for activity change, appetite change, chills, diaphoresis, fatigue, fever and unexpected weight change  HENT: Negative for congestion, dental problem, drooling, ear discharge, ear pain, facial swelling, hearing loss, mouth sores, nosebleeds, postnasal drip, rhinorrhea, sinus pressure, sneezing, sore throat, tinnitus, trouble swallowing and voice change  Eyes: Negative for photophobia, pain, discharge, redness, itching and visual disturbance  Respiratory: Negative for apnea, cough, choking, chest tightness, shortness of breath, wheezing and stridor  Cardiovascular: Negative for chest pain, palpitations and leg swelling  Gastrointestinal: Positive for abdominal pain and nausea  Negative for abdominal distention, anal bleeding, blood in stool, constipation, diarrhea, rectal pain and vomiting  Endocrine: Negative for cold intolerance, heat intolerance, polydipsia, polyphagia and polyuria  Genitourinary: Negative for decreased urine volume, difficulty urinating, dysuria, enuresis, flank pain, frequency, genital sores, hematuria and urgency  Musculoskeletal: Negative for arthralgias, back pain, gait problem, joint swelling, myalgias, neck pain and neck stiffness  Skin: Negative for color change, pallor, rash and wound  Allergic/Immunologic: Negative  Negative for environmental allergies, food allergies and immunocompromised state  Neurological: Negative for dizziness, tremors, seizures, syncope, facial asymmetry, speech difficulty, weakness, light-headedness, numbness and headaches  Psychiatric/Behavioral: Negative for agitation, behavioral problems, confusion, decreased concentration, dysphoric mood, hallucinations, self-injury, sleep disturbance and suicidal ideas   The patient is not nervous/anxious and is not hyperactive  Historical Information   Patient Active Problem List   Diagnosis   • Anxiety disorder   • Chronic back pain   • Chronic obstructive lung disease (HCC)   • Depressive disorder   • Hypertensive disorder   • Irritable bowel syndrome   • Lumbar post-laminectomy syndrome   • Lumbar radiculopathy   • Vertigo   • Cervical radiculopathy   • Acute cystitis without hematuria   • B12 deficiency   • Mild protein-calorie malnutrition (HCC)   • Acute superficial gastritis without hemorrhage     Past Medical History:   Diagnosis Date   • Abdominal pain    • Back pain    • Chest pain    • COPD (chronic obstructive pulmonary disease) (HCC)    • Dizziness    • Fibromyalgia    • GERD (gastroesophageal reflux disease)    • Hypertension    • Inflammatory bowel disease    • Lightheadedness    • RVOT ventricular tachycardia    • SOB (shortness of breath)      Past Surgical History:   Procedure Laterality Date   • LUMBAR LAMINECTOMY     • SPINE SURGERY       Social History     Substance and Sexual Activity   Alcohol Use No     Social History     Substance and Sexual Activity   Drug Use No     Social History     Tobacco Use   Smoking Status Current Every Day Smoker   • Packs/day: 0 25   • Types: Cigarettes   Smokeless Tobacco Never Used     Family History   Problem Relation Age of Onset   • Heart disease Mother      Health Maintenance Due   Topic   • Hepatitis C Screening    • COVID-19 Vaccine (1)   • Pneumococcal Vaccine: 65+ Years (1 - PCV)   • Breast Cancer Screening: Mammogram    • Colorectal Cancer Screening    • Osteoporosis Screening    • Influenza Vaccine (1)      Meds/Allergies       Current Outpatient Medications:   •  Acetaminophen-Codeine (TYLENOL WITH CODEINE #4 PO), q6h, Disp: , Rfl:   •  fluticasone (FLONASE) 50 mcg/act nasal spray, 1 spray into each nostril 2 (two) times a day, Disp: , Rfl:   •  MECLIZINE HCL PO, Take 6 25 mg by mouth as needed  , Disp: , Rfl:   •  metoprolol tartrate (LOPRESSOR) 25 mg tablet, Take 25 mg by mouth every 12 (twelve) hours, Disp: , Rfl:   •  ondansetron (Zofran ODT) 4 mg disintegrating tablet, Take 1 tablet (4 mg total) by mouth every 6 (six) hours as needed for nausea or vomiting, Disp: 20 tablet, Rfl: 1  •  fluticasone (FLOVENT DISKUS) 50 MCG/BLIST diskus inhaler, Inhale 1 puff daily  (Patient not taking: No sig reported), Disp: , Rfl:       Objective:    Vitals:   /70   Pulse 61   Temp 97 6 °F (36 4 °C)   Resp 15   Ht 5' 3" (1 6 m)   Wt 45 4 kg (100 lb)   SpO2 97%   BMI 17 71 kg/m²   Body mass index is 17 71 kg/m²  Vitals:    10/24/22 1143   Weight: 45 4 kg (100 lb)       Physical Exam  Constitutional:       General: She is not in acute distress  Appearance: She is well-developed  She is not ill-appearing, toxic-appearing or diaphoretic  HENT:      Head: Normocephalic and atraumatic  Right Ear: External ear normal       Left Ear: External ear normal       Nose: Nose normal       Mouth/Throat:      Pharynx: No oropharyngeal exudate  Eyes:      General: Lids are normal  Lids are everted, no foreign bodies appreciated  No scleral icterus  Right eye: No discharge  Left eye: No discharge  Conjunctiva/sclera: Conjunctivae normal       Pupils: Pupils are equal, round, and reactive to light  Neck:      Thyroid: No thyromegaly  Vascular: Normal carotid pulses  No carotid bruit, hepatojugular reflux or JVD  Trachea: No tracheal tenderness or tracheal deviation  Cardiovascular:      Rate and Rhythm: Normal rate and regular rhythm  Pulses: Normal pulses  Heart sounds: Normal heart sounds  No murmur heard  No friction rub  No gallop  Pulmonary:      Effort: Pulmonary effort is normal  No respiratory distress  Breath sounds: Normal breath sounds  No stridor  No wheezing or rales  Chest:      Chest wall: No tenderness  Abdominal:      General: Bowel sounds are normal  There is no distension  Palpations: Abdomen is soft  There is no mass  Tenderness: There is no abdominal tenderness  There is no guarding or rebound  Comments: Epigastric tenderness   Musculoskeletal:         General: No tenderness or deformity  Normal range of motion  Cervical back: Normal range of motion and neck supple  No edema, erythema or rigidity  No spinous process tenderness or muscular tenderness  Normal range of motion  Lymphadenopathy:      Head:      Right side of head: No submental, submandibular, tonsillar, preauricular or posterior auricular adenopathy  Left side of head: No submental, submandibular, tonsillar, preauricular, posterior auricular or occipital adenopathy  Cervical: No cervical adenopathy  Right cervical: No superficial, deep or posterior cervical adenopathy  Left cervical: No superficial, deep or posterior cervical adenopathy  Upper Body:      Right upper body: No pectoral adenopathy  Left upper body: No pectoral adenopathy  Skin:     General: Skin is warm and dry  Coloration: Skin is not pale  Findings: No erythema or rash  Neurological:      Mental Status: She is alert and oriented to person, place, and time  Cranial Nerves: No cranial nerve deficit  Sensory: No sensory deficit  Motor: No tremor, abnormal muscle tone or seizure activity  Coordination: Coordination normal       Gait: Gait normal       Deep Tendon Reflexes: Reflexes are normal and symmetric  Reflexes normal    Psychiatric:         Behavior: Behavior normal          Thought Content:  Thought content normal          Judgment: Judgment normal          Lab Review   Appointment on 09/29/2022   Component Date Value Ref Range Status   • Vitamin B-12 09/29/2022 887  100 - 900 pg/mL Final   • Folate 09/29/2022 6 1  3 1 - 17 5 ng/mL Final   Admission on 09/09/2022, Discharged on 09/09/2022   Component Date Value Ref Range Status   • Color, UA 09/09/2022 Yellow   Final   • Clarity, UA 09/09/2022 Slightly Cloudy   Final   • Specific Gravity, UA 09/09/2022 <=1 005  1 000 - 1 030 Final   • pH, UA 09/09/2022 6 0  5 0, 5 5, 6 0, 6 5, 7 0, 7 5, 8 0, 8 5, 9 0 Final   • Leukocytes, UA 09/09/2022 Small (A) Negative Final   • Nitrite, UA 09/09/2022 Negative  Negative Final   • Protein, UA 09/09/2022 Negative  Negative mg/dl Final   • Glucose, UA 09/09/2022 500 (1/2%) (A) Negative mg/dl Final   • Ketones, UA 09/09/2022 Negative  Negative mg/dl Final   • Urobilinogen, UA 09/09/2022 0 2  0 2, 1 0 E U /dl E U /dl Final   • Bilirubin, UA 09/09/2022 Negative  Negative Final   • Occult Blood, UA 09/09/2022 Large (A) Negative Final   • Urine Culture 09/09/2022 >100,000 cfu/ml Escherichia coli (A)  Final    This organism has been edited  The previous result was Gram Negative Bowen resembling Escherichia coli on 9/10/2022 at 1416 EDT  • RBC, UA 09/09/2022 1-2  None Seen, 0-1, 1-2, 2-4, 0-5 /hpf Final   • WBC, UA 09/09/2022 10-20 (A) None Seen, 0-1, 1-2, 0-5, 2-4 /hpf Final   • Epithelial Cells 09/09/2022 Occasional  None Seen, Occasional /hpf Final   • Bacteria, UA 09/09/2022 Occasional  None Seen, Occasional /hpf Final         Patient Instructions   Diet as tolerated and take Zofran every 6 hour as needed for nausea and plenty of fluid       Dilipkumar M Michael        "This note has been constructed using a voice recognition system  Therefore there may be syntax, spelling, and/or grammatical errors   Please call if you have any questions  "

## 2022-10-27 ENCOUNTER — HOSPITAL ENCOUNTER (EMERGENCY)
Facility: HOSPITAL | Age: 72
Discharge: HOME | End: 2022-10-27
Attending: EMERGENCY MEDICINE
Payer: COMMERCIAL

## 2022-10-27 ENCOUNTER — APPOINTMENT (EMERGENCY)
Dept: RADIOLOGY | Facility: HOSPITAL | Age: 72
End: 2022-10-27
Payer: COMMERCIAL

## 2022-10-27 VITALS
HEART RATE: 56 BPM | TEMPERATURE: 98.5 F | SYSTOLIC BLOOD PRESSURE: 133 MMHG | BODY MASS INDEX: 17.71 KG/M2 | DIASTOLIC BLOOD PRESSURE: 60 MMHG | RESPIRATION RATE: 18 BRPM | OXYGEN SATURATION: 99 % | WEIGHT: 100 LBS

## 2022-10-27 DIAGNOSIS — R10.9 ABDOMINAL PAIN: Primary | ICD-10-CM

## 2022-10-27 DIAGNOSIS — N83.209 OVARIAN CYST: ICD-10-CM

## 2022-10-27 DIAGNOSIS — K63.89 COLONIC MASS: ICD-10-CM

## 2022-10-27 LAB
ALBUMIN SERPL BCP-MCNC: 3.6 G/DL (ref 3.5–5)
ALP SERPL-CCNC: 58 U/L (ref 46–116)
ALT SERPL W P-5'-P-CCNC: 17 U/L (ref 12–78)
ANION GAP SERPL CALCULATED.3IONS-SCNC: 4 MMOL/L (ref 4–13)
AST SERPL W P-5'-P-CCNC: 16 U/L (ref 5–45)
BASOPHILS # BLD AUTO: 0.05 THOUSANDS/ÂΜL (ref 0–0.1)
BASOPHILS NFR BLD AUTO: 0 % (ref 0–1)
BILIRUB SERPL-MCNC: 0.61 MG/DL (ref 0.2–1)
BILIRUB UR QL STRIP: NEGATIVE
BUN SERPL-MCNC: 10 MG/DL (ref 5–25)
CALCIUM SERPL-MCNC: 8.5 MG/DL (ref 8.3–10.1)
CHLORIDE SERPL-SCNC: 103 MMOL/L (ref 96–108)
CLARITY UR: CLEAR
CO2 SERPL-SCNC: 30 MMOL/L (ref 21–32)
COLOR UR: NORMAL
CREAT SERPL-MCNC: 0.6 MG/DL (ref 0.6–1.3)
EOSINOPHIL # BLD AUTO: 0.11 THOUSAND/ÂΜL (ref 0–0.61)
EOSINOPHIL NFR BLD AUTO: 1 % (ref 0–6)
ERYTHROCYTE [DISTWIDTH] IN BLOOD BY AUTOMATED COUNT: 12.8 % (ref 11.6–15.1)
GFR SERPL CREATININE-BSD FRML MDRD: 90 ML/MIN/1.73SQ M
GLUCOSE SERPL-MCNC: 82 MG/DL (ref 65–140)
GLUCOSE UR STRIP-MCNC: NEGATIVE MG/DL
HCT VFR BLD AUTO: 41.5 % (ref 34.8–46.1)
HGB BLD-MCNC: 13.7 G/DL (ref 11.5–15.4)
HGB UR QL STRIP.AUTO: NEGATIVE
IMM GRANULOCYTES # BLD AUTO: 0.05 THOUSAND/UL (ref 0–0.2)
IMM GRANULOCYTES NFR BLD AUTO: 0 % (ref 0–2)
KETONES UR STRIP-MCNC: NEGATIVE MG/DL
LEUKOCYTE ESTERASE UR QL STRIP: NEGATIVE
LIPASE SERPL-CCNC: 77 U/L (ref 73–393)
LYMPHOCYTES # BLD AUTO: 2.28 THOUSANDS/ÂΜL (ref 0.6–4.47)
LYMPHOCYTES NFR BLD AUTO: 16 % (ref 14–44)
MCH RBC QN AUTO: 32.1 PG (ref 26.8–34.3)
MCHC RBC AUTO-ENTMCNC: 33 G/DL (ref 31.4–37.4)
MCV RBC AUTO: 97 FL (ref 82–98)
MONOCYTES # BLD AUTO: 1.13 THOUSAND/ÂΜL (ref 0.17–1.22)
MONOCYTES NFR BLD AUTO: 8 % (ref 4–12)
NEUTROPHILS # BLD AUTO: 10.98 THOUSANDS/ÂΜL (ref 1.85–7.62)
NEUTS SEG NFR BLD AUTO: 75 % (ref 43–75)
NITRITE UR QL STRIP: NEGATIVE
NRBC BLD AUTO-RTO: 0 /100 WBCS
PH UR STRIP.AUTO: 5.5 [PH]
PLATELET # BLD AUTO: 252 THOUSANDS/UL (ref 149–390)
PMV BLD AUTO: 9.4 FL (ref 8.9–12.7)
POTASSIUM SERPL-SCNC: 3.7 MMOL/L (ref 3.5–5.3)
PROT SERPL-MCNC: 6.2 G/DL (ref 6.4–8.4)
PROT UR STRIP-MCNC: NEGATIVE MG/DL
RBC # BLD AUTO: 4.27 MILLION/UL (ref 3.81–5.12)
SODIUM SERPL-SCNC: 137 MMOL/L (ref 135–147)
SP GR UR STRIP.AUTO: <=1.005 (ref 1–1.03)
UROBILINOGEN UR QL STRIP.AUTO: 0.2 E.U./DL
WBC # BLD AUTO: 14.6 THOUSAND/UL (ref 4.31–10.16)

## 2022-10-27 PROCEDURE — 80053 COMPREHEN METABOLIC PANEL: CPT | Performed by: EMERGENCY MEDICINE

## 2022-10-27 PROCEDURE — 83690 ASSAY OF LIPASE: CPT | Performed by: EMERGENCY MEDICINE

## 2022-10-27 PROCEDURE — 93005 ELECTROCARDIOGRAM TRACING: CPT

## 2022-10-27 PROCEDURE — 85025 COMPLETE CBC W/AUTO DIFF WBC: CPT | Performed by: EMERGENCY MEDICINE

## 2022-10-27 PROCEDURE — 74177 CT ABD & PELVIS W/CONTRAST: CPT

## 2022-10-27 PROCEDURE — G1004 CDSM NDSC: HCPCS

## 2022-10-27 PROCEDURE — 36415 COLL VENOUS BLD VENIPUNCTURE: CPT | Performed by: EMERGENCY MEDICINE

## 2022-10-27 PROCEDURE — 81003 URINALYSIS AUTO W/O SCOPE: CPT | Performed by: EMERGENCY MEDICINE

## 2022-10-27 RX ADMIN — SODIUM CHLORIDE 1000 ML: 0.9 INJECTION, SOLUTION INTRAVENOUS at 12:29

## 2022-10-27 RX ADMIN — IOHEXOL 100 ML: 350 INJECTION, SOLUTION INTRAVENOUS at 13:07

## 2022-10-27 NOTE — ED NOTES
Pt called to ask for fluids  Pt states that if she does not see the doctor soon she wants to leave stating "I have an appointment soon"  This RN explained to pt that we are still waiting for results but will inform provider Indu that pt is requesting to see him        Lazara Koo, JOSE  10/27/22 2453

## 2022-10-27 NOTE — ED PROVIDER NOTES
History  Chief Complaint   Patient presents with   • Abdominal Pain     Upper abdominal pain that radiates around  her back  Since Saturday  Patient started feeling pain in the upper abdomen on  4 days prior to arrival   She 1st thought she had pulled a muscle from pulling weeds the day before  The pain was sharp and in the center of the abdomen above the umbilicus  Associated with abdominal distension and belching with nausea  No vomiting  No fever chills  She has also been having upper back pain across the midline she is not sure of its related to the abdominal pain or not  She states sometimes the pain gets worse before she eats but then is improved after eating  Patient was sent in by her PCP for evaluation          Prior to Admission Medications   Prescriptions Last Dose Informant Patient Reported? Taking? Acetaminophen-Codeine (TYLENOL WITH CODEINE #4 PO)   Yes No   Sig: q6h   MECLIZINE HCL PO   Yes No   Sig: Take 6 25 mg by mouth as needed  fluticasone (FLONASE) 50 mcg/act nasal spray   Yes No   Si spray into each nostril 2 (two) times a day   fluticasone (FLOVENT DISKUS) 50 MCG/BLIST diskus inhaler   Yes No   Sig: Inhale 1 puff daily     Patient not taking: No sig reported   metoprolol tartrate (LOPRESSOR) 25 mg tablet   Yes No   Sig: Take 25 mg by mouth every 12 (twelve) hours   ondansetron (Zofran ODT) 4 mg disintegrating tablet   No No   Sig: Take 1 tablet (4 mg total) by mouth every 6 (six) hours as needed for nausea or vomiting      Facility-Administered Medications: None       Past Medical History:   Diagnosis Date   • Abdominal pain    • Back pain    • Chest pain    • COPD (chronic obstructive pulmonary disease) (HCC)    • Dizziness    • Fibromyalgia    • GERD (gastroesophageal reflux disease)    • Hypertension    • Inflammatory bowel disease    • Lightheadedness    • RVOT ventricular tachycardia    • SOB (shortness of breath)        Past Surgical History:   Procedure Laterality Date   • LUMBAR LAMINECTOMY     • SPINE SURGERY         Family History   Problem Relation Age of Onset   • Heart disease Mother      I have reviewed and agree with the history as documented  E-Cigarette/Vaping   • E-Cigarette Use Never User      E-Cigarette/Vaping Substances     Social History     Tobacco Use   • Smoking status: Current Every Day Smoker     Packs/day: 0 25     Types: Cigarettes   • Smokeless tobacco: Never Used   Vaping Use   • Vaping Use: Never used   Substance Use Topics   • Alcohol use: No   • Drug use: No       Review of Systems   Constitutional: Negative for appetite change, chills and fever  HENT: Negative for congestion and sore throat  Eyes: Negative for visual disturbance  Respiratory: Negative for cough and shortness of breath  Cardiovascular: Negative for chest pain and leg swelling  Gastrointestinal: Positive for abdominal distention, abdominal pain and nausea  Negative for vomiting  Genitourinary: Negative for decreased urine volume and dysuria  Musculoskeletal: Positive for back pain  Skin: Negative for color change and rash  Neurological: Negative for headaches  Hematological: Does not bruise/bleed easily  Psychiatric/Behavioral: Negative for confusion  All other systems reviewed and are negative  Physical Exam  Physical Exam  Vitals and nursing note reviewed  Constitutional:       Appearance: She is well-developed  HENT:      Head: Normocephalic  Mouth/Throat:      Mouth: Mucous membranes are moist    Eyes:      Extraocular Movements: Extraocular movements intact  Cardiovascular:      Rate and Rhythm: Normal rate and regular rhythm  Heart sounds: Normal heart sounds  Pulmonary:      Effort: Pulmonary effort is normal    Abdominal:      General: Abdomen is flat  Bowel sounds are normal       Palpations: Abdomen is soft  Tenderness:  There is abdominal tenderness in the right upper quadrant, right lower quadrant and epigastric area  Hernia: No hernia is present  Skin:     General: Skin is warm and dry  Neurological:      General: No focal deficit present  Mental Status: She is alert and oriented to person, place, and time     Psychiatric:         Mood and Affect: Mood normal          Behavior: Behavior normal          Vital Signs  ED Triage Vitals   Temperature Pulse Respirations Blood Pressure SpO2   10/27/22 1144 10/27/22 1144 10/27/22 1144 10/27/22 1144 10/27/22 1144   99 8 °F (37 7 °C) 65 20 101/58 97 %      Temp Source Heart Rate Source Patient Position - Orthostatic VS BP Location FiO2 (%)   10/27/22 1420 10/27/22 1420 -- 10/27/22 1420 --   Oral Monitor  Right arm       Pain Score       10/27/22 1144       8           Vitals:    10/27/22 1144 10/27/22 1420   BP: 101/58 133/60   Pulse: 65 56         Visual Acuity      ED Medications  Medications   sodium chloride 0 9 % bolus 1,000 mL (0 mL Intravenous Stopped 10/27/22 1439)   iohexol (OMNIPAQUE) 350 MG/ML injection (SINGLE-DOSE) 100 mL (100 mL Intravenous Given 10/27/22 1307)       Diagnostic Studies  Results Reviewed     Procedure Component Value Units Date/Time    Comprehensive metabolic panel [[id number]]  (Abnormal) Collected: 10/27/22 1229    Lab Status: Final result Specimen: Blood from Arm, Right Updated: 10/27/22 1252     Sodium 137 mmol/L      Potassium 3 7 mmol/L      Chloride 103 mmol/L      CO2 30 mmol/L      ANION GAP 4 mmol/L      BUN 10 mg/dL      Creatinine 0 60 mg/dL      Glucose 82 mg/dL      Calcium 8 5 mg/dL      AST 16 U/L      ALT 17 U/L      Alkaline Phosphatase 58 U/L      Total Protein 6 2 g/dL      Albumin 3 6 g/dL      Total Bilirubin 0 61 mg/dL      eGFR 90 ml/min/1 73sq m     Narrative:      [place] guidelines for Chronic Kidney Disease (CKD):   •  Stage 1 with normal or high GFR (GFR > 90 mL/min/1 73 square meters)  •  Stage 2 Mild CKD (GFR = 60-89 mL/min/1 73 square meters)  •  Stage 3A Moderate CKD (GFR = 45-59 mL/min/1 73 square meters)  •  Stage 3B Moderate CKD (GFR = 30-44 mL/min/1 73 square meters)  •  Stage 4 Severe CKD (GFR = 15-29 mL/min/1 73 square meters)  •  Stage 5 End Stage CKD (GFR <15 mL/min/1 73 square meters)  Note: GFR calculation is accurate only with a steady state creatinine    Lipase [[id number]]  (Normal) Collected: 10/27/22 1229    Lab Status: Final result Specimen: Blood from Arm, Right Updated: 10/27/22 1252     Lipase 77 u/L     CBC and differential [[id number]]  (Abnormal) Collected: 10/27/22 1229    Lab Status: Final result Specimen: Blood from Arm, Right Updated: 10/27/22 1235     WBC 14 60 Thousand/uL      RBC 4 27 Million/uL      Hemoglobin 13 7 g/dL      Hematocrit 41 5 %      MCV 97 fL      MCH 32 1 pg      MCHC 33 0 g/dL      RDW 12 8 %      MPV 9 4 fL      Platelets 973 Thousands/uL      nRBC 0 /100 WBCs      Neutrophils Relative 75 %      Immat GRANS % 0 %      Lymphocytes Relative 16 %      Monocytes Relative 8 %      Eosinophils Relative 1 %      Basophils Relative 0 %      Neutrophils Absolute 10 98 Thousands/µL      Immature Grans Absolute 0 05 Thousand/uL      Lymphocytes Absolute 2 28 Thousands/µL      Monocytes Absolute 1 13 Thousand/µL      Eosinophils Absolute 0 11 Thousand/µL      Basophils Absolute 0 05 Thousands/µL     UA (URINE) with reflex to Scope [[id number]] Collected: 10/27/22 1219    Lab Status: Final result Specimen: Urine, Clean Catch Updated: 10/27/22 1233     Color, UA Light Yellow     Clarity, UA Clear     Specific Gravity, UA <=1 005     pH, UA 5 5     Leukocytes, UA Negative     Nitrite, UA Negative     Protein, UA Negative mg/dl      Glucose, UA Negative mg/dl      Ketones, UA Negative mg/dl      Urobilinogen, UA 0 2 E U /dl      Bilirubin, UA Negative     Occult Blood, UA Negative                 CT abdomen pelvis with contrast   Final Result by Mau Jerome MD (10/27 1434)      1  No acute inflammatory process in the abdomen or pelvis        2  Lobulated soft tissue density focus in the cecum  While this could represent stool given mild to moderate fecal stasis, the appearance is more solid than surrounding stool and without foci of gas centrally  Colonic neoplasm is possible and    follow-up colonoscopy is recommended  3   Right adnexal cyst for which follow-up ultrasound is recommended given the postmenopausal status of the patient  I personally discussed this study with Camila Chaves on 10/27/2022 at 2:25 PM                Workstation performed: [id number]                    Procedures  Procedures         ED Course                               SBIRT 22yo+    Flowsheet Row Most Recent Value   SBIRT (25 yo +)    In order to provide better care to our patients, we are screening all of our patients for alcohol and drug use  Would it be okay to ask you these screening questions? Yes Filed at: 10/27/2022 1333   Initial Alcohol Screen: US AUDIT-C     1  How often do you have a drink containing alcohol? 0 Filed at: 10/27/2022 1333   2  How many drinks containing alcohol do you have on a typical day you are drinking? 0 Filed at: 10/27/2022 1333   3a  Male UNDER 65: How often do you have five or more drinks on one occasion? 0 Filed at: 10/27/2022 1333   3b  FEMALE Any Age, or MALE 65+: How often do you have 4 or more drinks on one occassion? 0 Filed at: 10/27/2022 1333   Audit-C Score 0 Filed at: 10/27/2022 1333   SINTIA: How many times in the past year have you    Used an illegal drug or used a prescription medication for non-medical reasons? Never Filed at: 10/27/2022 1333                    MDM  Number of Diagnoses or Management Options  Diagnosis management comments: Epigastric, right upper quadrant and right lower quadrant pain and tenderness    Will CT scan for multiple possibilities      Disposition  Final diagnoses:   Abdominal pain   Colonic mass   Ovarian cyst     Time reflects when diagnosis was documented in both MDM as applicable and the Disposition within this note     Time User Action Codes Description Comment    10/27/2022  2:45 PM Jf Mendiola Add [R10 9] Abdominal pain     10/27/2022  2:45 PM Jf Mendiola Add [S04 22] Colonic mass     10/27/2022  2:45 PM Jf Mendiola Add [B06 630] Ovarian cyst       ED Disposition     ED Disposition   Discharge    Condition   Stable    Date/Time   Thu Oct 27, 2022  2:45 PM    Comment   Ritchie Ag discharge to home/self care  Follow-up Information     Follow up With Specialties Details Why Contact Info    Your gastroenterologist  Schedule an appointment as soon as possible for a visit             Patient's Medications   Discharge Prescriptions    No medications on file       No discharge procedures on file      PDMP Review     None          ED Provider  Electronically Signed by           Arminda Keith MD  10/27/22 3894

## 2022-10-28 LAB
ATRIAL RATE: 57 BPM
P AXIS: 81 DEGREES
PR INTERVAL: 142 MS
QRS AXIS: -8 DEGREES
QRSD INTERVAL: 76 MS
QT INTERVAL: 436 MS
QTC INTERVAL: 424 MS
T WAVE AXIS: 50 DEGREES
VENTRICULAR RATE: 57 BPM

## 2022-11-22 PROBLEM — N30.00 ACUTE CYSTITIS WITHOUT HEMATURIA: Status: RESOLVED | Noted: 2022-09-23 | Resolved: 2022-11-22

## 2022-11-29 ENCOUNTER — OFFICE VISIT (OUTPATIENT)
Dept: INTERNAL MEDICINE CLINIC | Facility: CLINIC | Age: 72
End: 2022-11-29

## 2022-11-29 VITALS
WEIGHT: 100 LBS | BODY MASS INDEX: 17.72 KG/M2 | OXYGEN SATURATION: 97 % | SYSTOLIC BLOOD PRESSURE: 110 MMHG | TEMPERATURE: 97.6 F | RESPIRATION RATE: 15 BRPM | DIASTOLIC BLOOD PRESSURE: 80 MMHG | HEART RATE: 68 BPM | HEIGHT: 63 IN

## 2022-11-29 DIAGNOSIS — M96.1 LUMBAR POST-LAMINECTOMY SYNDROME: ICD-10-CM

## 2022-11-29 DIAGNOSIS — B07.9 VIRAL WARTS, UNSPECIFIED TYPE: Primary | ICD-10-CM

## 2022-11-29 DIAGNOSIS — J41.0 SIMPLE CHRONIC BRONCHITIS (HCC): ICD-10-CM

## 2022-11-29 DIAGNOSIS — F41.3 OTHER MIXED ANXIETY DISORDERS: ICD-10-CM

## 2022-11-29 DIAGNOSIS — K58.0 IRRITABLE BOWEL SYNDROME WITH DIARRHEA: ICD-10-CM

## 2022-11-29 DIAGNOSIS — I10 PRIMARY HYPERTENSION: ICD-10-CM

## 2022-11-29 DIAGNOSIS — R42 VERTIGO: ICD-10-CM

## 2022-11-29 NOTE — PROGRESS NOTES
Dr Cathleen Brandt Office Visit Note  22     Renea Johnson 74 y o  female MRN: [id number]  : 1950    Assessment:     1  Viral warts, unspecified type  -     Ambulatory Referral to Dermatology; Future          Discussion Summary and Plan: Today's care plan and medications were reviewed with patient in detail and all their questions answered to their satisfaction  Chief Complaint   Patient presents with   • Dizziness     Ringing in the ears  Subjective:  Patient came in for follow-up for persistent dizziness vertigo unable to hear left ear seen by ENT 2 weeks ago was given some antihistamine no improvement also cervical spine radiculopathy due to the bulging disc narrowing patient Age going through the physical therapy only 3 days of months due to the restriction from transient loss using meclizine 12 5 3 times a day as needed which is helping for the vertigo and abdominal pain intermittent due to irritable bowel syndrome patient reluctant to take any of the treatment for tinnitus and vertigo except meclizine      The following portions of the patient's history were reviewed and updated as appropriate: allergies, current medications, past family history, past medical history, past social history, past surgical history and problem list     Review of Systems   Constitutional: Negative for activity change, appetite change, chills, diaphoresis, fatigue, fever and unexpected weight change  HENT: Negative for congestion, dental problem, drooling, ear discharge, ear pain, facial swelling, hearing loss, mouth sores, nosebleeds, postnasal drip, rhinorrhea, sinus pressure, sneezing, sore throat, tinnitus, trouble swallowing and voice change  Eyes: Negative for photophobia, pain, discharge, redness, itching and visual disturbance  Respiratory: Negative for apnea, cough, choking, chest tightness, shortness of breath, wheezing and stridor      Cardiovascular: Negative for chest pain, palpitations and leg swelling  Gastrointestinal: Negative for abdominal distention, abdominal pain, anal bleeding, blood in stool, constipation, diarrhea, nausea, rectal pain and vomiting  Endocrine: Negative for cold intolerance, heat intolerance, polydipsia, polyphagia and polyuria  Genitourinary: Negative for decreased urine volume, difficulty urinating, dysuria, enuresis, flank pain, frequency, genital sores, hematuria and urgency  Musculoskeletal: Positive for arthralgias, gait problem, joint swelling, myalgias, neck pain and neck stiffness  Negative for back pain  Skin: Negative for color change, pallor, rash and wound  Allergic/Immunologic: Negative  Negative for environmental allergies, food allergies and immunocompromised state  Neurological: Positive for dizziness, tremors, weakness and light-headedness  Negative for seizures, syncope, facial asymmetry, speech difficulty, numbness and headaches  Psychiatric/Behavioral: Negative for agitation, behavioral problems, confusion, decreased concentration, dysphoric mood, hallucinations, self-injury, sleep disturbance and suicidal ideas  The patient is nervous/anxious  The patient is not hyperactive            Historical Information   Patient Active Problem List   Diagnosis   • Anxiety disorder   • Chronic back pain   • Chronic obstructive lung disease (HCC)   • Depressive disorder   • Hypertensive disorder   • Irritable bowel syndrome   • Lumbar post-laminectomy syndrome   • Lumbar radiculopathy   • Vertigo   • Cervical radiculopathy   • B12 deficiency   • Mild protein-calorie malnutrition ([facility])   • Acute superficial gastritis without hemorrhage     Past Medical History:   Diagnosis Date   • Abdominal pain    • Back pain    • Chest pain    • COPD (chronic obstructive pulmonary disease) ([facility])    • Dizziness    • Fibromyalgia    • GERD (gastroesophageal reflux disease)    • Hypertension    • Inflammatory bowel disease    • Lightheadedness    • RVOT ventricular tachycardia    • SOB (shortness of breath)      Past Surgical History:   Procedure Laterality Date   • LUMBAR LAMINECTOMY     • SPINE SURGERY       Social History     Substance and Sexual Activity   Alcohol Use No     Social History     Substance and Sexual Activity   Drug Use No     Social History     Tobacco Use   Smoking Status Every Day   • Packs/day: 0 25   • Types: Cigarettes   Smokeless Tobacco Never     Family History   Problem Relation Age of Onset   • Heart disease Mother      Health Maintenance Due   Topic   • Hepatitis C Screening    • Hepatitis B Vaccine (1 of 3 - 3-dose series)   • COVID-19 Vaccine (1)   • Pneumococcal Vaccine: 65+ Years (1 - PCV)   • Breast Cancer Screening: Mammogram    • Colorectal Cancer Screening    • Osteoporosis Screening    • Influenza Vaccine (1)      Meds/Allergies       Current Outpatient Medications:   •  Acetaminophen-Codeine (TYLENOL WITH CODEINE #4 PO), q6h, Disp: , Rfl:   •  fluticasone (FLONASE) 50 mcg/act nasal spray, 1 spray into each nostril 2 (two) times a day, Disp: , Rfl:   •  MECLIZINE HCL PO, Take 6 25 mg by mouth as needed  , Disp: , Rfl:   •  metoprolol tartrate (LOPRESSOR) 25 mg tablet, Take 25 mg by mouth every 12 (twelve) hours, Disp: , Rfl:       Objective:    Vitals:   /80   Pulse 68   Temp 97 6 °F (36 4 °C)   Resp 15   Ht 5' 3" (1 6 m)   Wt 45 4 kg (100 lb)   SpO2 97%   BMI 17 71 kg/m²   Body mass index is 17 71 kg/m²  Vitals:    11/29/22 1340   Weight: 45 4 kg (100 lb)       Physical Exam  Vitals and nursing note reviewed  Constitutional:       General: She is not in acute distress  Appearance: She is well-developed and well-nourished  She is not ill-appearing, toxic-appearing, sickly-appearing or diaphoretic  Comments: Malnourished   HENT:      Head: Normocephalic and atraumatic        Right Ear: External ear normal       Left Ear: External ear normal       Nose: Nose normal       Mouth/Throat:      Mouth: Oropharynx is clear and moist       Pharynx: No oropharyngeal exudate  Eyes:      General: Lids are normal  Lids are everted, no foreign bodies appreciated  No scleral icterus  Right eye: No discharge  Left eye: No discharge  Extraocular Movements: EOM normal       Conjunctiva/sclera: Conjunctivae normal       Pupils: Pupils are equal, round, and reactive to light  Neck:      Thyroid: No thyromegaly  Vascular: Normal carotid pulses  No carotid bruit, hepatojugular reflux or JVD  Trachea: No tracheal tenderness or tracheal deviation  Cardiovascular:      Rate and Rhythm: Normal rate and regular rhythm  Pulses: Normal pulses and intact distal pulses  Heart sounds: Normal heart sounds  No murmur heard  No friction rub  No gallop  Pulmonary:      Effort: Pulmonary effort is normal  No respiratory distress  Breath sounds: Normal breath sounds  No stridor  No wheezing or rales  Chest:      Chest wall: No tenderness  Abdominal:      General: Bowel sounds are normal  There is no distension or ascites  Palpations: Abdomen is soft  There is no mass  Tenderness: There is no abdominal tenderness  There is no guarding or rebound  Musculoskeletal:         General: No tenderness, deformity or edema  Normal range of motion  Cervical back: Normal range of motion and neck supple  No edema, erythema or rigidity  No spinous process tenderness or muscular tenderness  Normal range of motion  Comments: Paraspinal muscle spasm of cervical spine with tenderness movement restricted   Lymphadenopathy:      Head:      Right side of head: No submental, submandibular, tonsillar, preauricular or posterior auricular adenopathy  Left side of head: No submental, submandibular, tonsillar, preauricular, posterior auricular or occipital adenopathy  Cervical: No cervical adenopathy  Right cervical: No superficial, deep or posterior cervical adenopathy       Left cervical: No superficial, deep or posterior cervical adenopathy  Upper Body:      Right upper body: No pectoral or lateral adenopathy  Left upper body: No pectoral or lateral adenopathy  Skin:     General: Skin is warm and dry  Coloration: Skin is not pale  Findings: No erythema or rash  Neurological:      Mental Status: She is alert and oriented to person, place, and time  Cranial Nerves: No cranial nerve deficit  Sensory: No sensory deficit  Motor: Motor strength is normal  No tremor, abnormal muscle tone or seizure activity  Coordination: She displays a negative Romberg sign  Coordination normal       Gait: Gait normal       Deep Tendon Reflexes: Reflexes are normal and symmetric  Reflexes normal    Psychiatric:         Mood and Affect: Mood and affect normal          Behavior: Behavior normal          Thought Content:  Thought content normal          Judgment: Judgment normal          Lab Review   Admission on 10/27/2022, Discharged on 10/27/2022   Component Date Value Ref Range Status   • WBC 10/27/2022 14 60 (H)  4 31 - 10 16 Thousand/uL Final   • RBC 10/27/2022 4 27  3 81 - 5 12 Million/uL Final   • Hemoglobin 10/27/2022 13 7  11 5 - 15 4 g/dL Final   • Hematocrit 10/27/2022 41 5  34 8 - 46 1 % Final   • MCV 10/27/2022 97  82 - 98 fL Final   • MCH 10/27/2022 32 1  26 8 - 34 3 pg Final   • MCHC 10/27/2022 33 0  31 4 - 37 4 g/dL Final   • RDW 10/27/2022 12 8  11 6 - 15 1 % Final   • MPV 10/27/2022 9 4  8 9 - 12 7 fL Final   • Platelets 66/01/6980 252  149 - 390 Thousands/uL Final   • nRBC 10/27/2022 0  /100 WBCs Final   • Neutrophils Relative 10/27/2022 75  43 - 75 % Final   • Immat GRANS % 10/27/2022 0  0 - 2 % Final   • Lymphocytes Relative 10/27/2022 16  14 - 44 % Final   • Monocytes Relative 10/27/2022 8  4 - 12 % Final   • Eosinophils Relative 10/27/2022 1  0 - 6 % Final   • Basophils Relative 10/27/2022 0  0 - 1 % Final   • Neutrophils Absolute 10/27/2022 10 98 (H)  1 85 - 7 62 Thousands/µL Final   • Immature Grans Absolute 10/27/2022 0 05  0 00 - 0 20 Thousand/uL Final   • Lymphocytes Absolute 10/27/2022 2 28  0 60 - 4 47 Thousands/µL Final   • Monocytes Absolute 10/27/2022 1 13  0 17 - 1 22 Thousand/µL Final   • Eosinophils Absolute 10/27/2022 0 11  0 00 - 0 61 Thousand/µL Final   • Basophils Absolute 10/27/2022 0 05  0 00 - 0 10 Thousands/µL Final   • Sodium 10/27/2022 137  135 - 147 mmol/L Final   • Potassium 10/27/2022 3 7  3 5 - 5 3 mmol/L Final   • Chloride 10/27/2022 103  96 - 108 mmol/L Final   • CO2 10/27/2022 30  21 - 32 mmol/L Final   • ANION GAP 10/27/2022 4  4 - 13 mmol/L Final   • BUN 10/27/2022 10  5 - 25 mg/dL Final   • Creatinine 10/27/2022 0 60  0 60 - 1 30 mg/dL Final    Standardized to IDMS reference method   • Glucose 10/27/2022 82  65 - 140 mg/dL Final    If the patient is fasting, the ADA then defines impaired fasting glucose as > 100 mg/dL and diabetes as > or equal to 123 mg/dL  Specimen collection should occur prior to Sulfasalazine administration due to the potential for falsely depressed results  Specimen collection should occur prior to Sulfapyridine administration due to the potential for falsely elevated results  • Calcium 10/27/2022 8 5  8 3 - 10 1 mg/dL Final   • AST 10/27/2022 16  5 - 45 U/L Final    Specimen collection should occur prior to Sulfasalazine administration due to the potential for falsely depressed results  • ALT 10/27/2022 17  12 - 78 U/L Final    Specimen collection should occur prior to Sulfasalazine administration due to the potential for falsely depressed results      • Alkaline Phosphatase 10/27/2022 58  46 - 116 U/L Final   • Total Protein 10/27/2022 6 2 (L)  6 4 - 8 4 g/dL Final   • Albumin 10/27/2022 3 6  3 5 - 5 0 g/dL Final   • Total Bilirubin 10/27/2022 0 61  0 20 - 1 00 mg/dL Final    Use of this assay is not recommended for patients undergoing treatment with eltrombopag due to the potential for falsely elevated results  • eGFR 10/27/2022 90  ml/min/1 73sq m Final   • Lipase 10/27/2022 77  73 - 393 u/L Final   • Color, UA 10/27/2022 Light Yellow   Final   • Clarity, UA 10/27/2022 Clear   Final   • Specific Gravity, UA 10/27/2022 <=1 005  1 000 - 1 030 Final   • pH, UA 10/27/2022 5 5  5 0, 5 5, 6 0, 6 5, 7 0, 7 5, 8 0, 8 5, 9 0 Final   • Leukocytes, UA 10/27/2022 Negative  Negative Final   • Nitrite, UA 10/27/2022 Negative  Negative Final   • Protein, UA 10/27/2022 Negative  Negative mg/dl Final   • Glucose, UA 10/27/2022 Negative  Negative mg/dl Final   • Ketones, UA 10/27/2022 Negative  Negative mg/dl Final   • Urobilinogen, UA 10/27/2022 0 2  0 2, 1 0 E U /dl E U /dl Final   • Bilirubin, UA 10/27/2022 Negative  Negative Final   • Occult Blood, UA 10/27/2022 Negative  Negative Final   • Ventricular Rate 10/27/2022 57  BPM Final   • Atrial Rate 10/27/2022 57  BPM Final   • RI Interval 10/27/2022 142  ms Final   • QRSD Interval 10/27/2022 76  ms Final   • QT Interval 10/27/2022 436  ms Final   • QTC Interval 10/27/2022 424  ms Final   • P Axis 10/27/2022 81  degrees Final   • QRS Axis 10/27/2022 -8  degrees Final   • T Wave Axis 10/27/2022 50  degrees Final         There are no Patient Instructions on file for this visit  Toby Marte MD        "This note has been constructed using a voice recognition system  Therefore there may be syntax, spelling, and/or grammatical errors   Please call if you have any questions  "

## 2022-11-29 NOTE — ASSESSMENT & PLAN NOTE
MRI reviewed continue physical therapy and over-the-counter Tylenol and will as needed symptoms improved

## 2022-11-29 NOTE — ASSESSMENT & PLAN NOTE
Intermittent abdominal pain continue diet the patient reluctant to take Bentyl follow-up with the GI

## 2022-11-29 NOTE — ASSESSMENT & PLAN NOTE
To take meclizine 12 5 3 times a day as needed also referred to [facility] continue physical therapy

## 2022-11-29 NOTE — ASSESSMENT & PLAN NOTE
For nocturnal symptoms patient is not using any inhaler patient had quit smoking more than 5 years ago

## 2023-01-11 ENCOUNTER — TELEMEDICINE (OUTPATIENT)
Dept: INTERNAL MEDICINE CLINIC | Facility: CLINIC | Age: 73
End: 2023-01-11

## 2023-01-11 DIAGNOSIS — J41.0 SIMPLE CHRONIC BRONCHITIS (HCC): ICD-10-CM

## 2023-01-11 DIAGNOSIS — E44.1 MILD PROTEIN-CALORIE MALNUTRITION (HCC): ICD-10-CM

## 2023-01-11 DIAGNOSIS — I10 PRIMARY HYPERTENSION: ICD-10-CM

## 2023-01-11 DIAGNOSIS — U07.1 COVID-19: ICD-10-CM

## 2023-01-11 DIAGNOSIS — U07.1 COVID-19: Primary | ICD-10-CM

## 2023-01-11 RX ORDER — NIRMATRELVIR AND RITONAVIR 300-100 MG
3 KIT ORAL 2 TIMES DAILY
Qty: 30 TABLET | Refills: 0 | Status: SHIPPED | OUTPATIENT
Start: 2023-01-11 | End: 2023-01-16

## 2023-01-11 RX ORDER — NIRMATRELVIR AND RITONAVIR 300-100 MG
3 KIT ORAL 2 TIMES DAILY
Qty: 30 TABLET | Refills: 0 | Status: SHIPPED | OUTPATIENT
Start: 2023-01-11 | End: 2023-01-11 | Stop reason: SDUPTHER

## 2023-01-11 RX ORDER — NIRMATRELVIR AND RITONAVIR 300-100 MG
3 KIT ORAL 2 TIMES DAILY
Qty: 30 TABLET | Refills: 0 | Status: SHIPPED | OUTPATIENT
Start: 2023-01-11 | End: 2023-01-11

## 2023-01-11 NOTE — PROGRESS NOTES
COVID-19 Outpatient Progress Note    Assessment/Plan:    Problem List Items Addressed This Visit        Respiratory    Chronic obstructive lung disease ([facility] [address] )     Symptoms controlled not using any inhaler patient quit smoking many years ago            Cardiovascular and Mediastinum    Hypertensive disorder     Blood pressure controlled with low-salt diet and metoprolol continue current treatment            Other    Mild protein-calorie malnutrition ([facility] [address] )     Malnutrition Findings:       Patient high risk due to for concurrent malnutrition this is video televisit showed the current height weight not available but from the previous records patient is malnourished encouraged nutritional supplements and nutritionist consult                          BMI Findings: There is no height or weight on file to calculate BMI  COVID-19 - Primary     Patient came in contact with COVID-19 patient now complains of coughing intractable intractable sinus congestion headache aches pains low-grade fever malaise treated with vitamin C vitamin D zinc and over-the-counter cough medicine and paxlovid  COVID-19 Home Care Guidelines    Your healthcare provider and/or public health staff have evaluated you and have determined that you do not need to remain in the hospital at this time  At this time you can be isolated at home where you will be monitored by staff from your local or state health department  You should carefully follow the prevention and isolation steps below until a healthcare provider or local or state health department says that you can return to your normal activities  Stay home except to get medical care    People who are mildly ill with COVID-19 are able to isolate at home during their illness  You should restrict activities outside your home, except for getting medical care  Do not go to work, school, or public areas  Avoid using public transportation, ride-sharing, or taxis      Separate yourself from other people and animals in your home    People: As much as possible, you should stay in a specific room and away from other people in your home  Also, you should use a separate bathroom, if available  Animals: You should restrict contact with pets and other animals while you are sick with COVID-19, just like you would around other people  Although there have not been reports of pets or other animals becoming sick with COVID-19, it is still recommended that people sick with COVID-19 limit contact with animals until more information is known about the virus  When possible, have another member of your household care for your animals while you are sick  If you are sick with COVID-19, avoid contact with your pet, including petting, snuggling, being kissed or licked, and sharing food  If you must care for your pet or be around animals while you are sick, wash your hands before and after you interact with pets and wear a facemask  See COVID-19 and Animals for more information  Call ahead before visiting your doctor    If you have a medical appointment, call the healthcare provider and tell them that you have or may have COVID-19  This will help the healthcare provider’s office take steps to keep other people from getting infected or exposed  Wear a facemask    You should wear a facemask when you are around other people (e g , sharing a room or vehicle) or pets and before you enter a healthcare provider’s office  If you are not able to wear a facemask (for example, because it causes trouble breathing), then people who live with you should not stay in the same room with you, or they should wear a facemask if they enter your room  Cover your coughs and sneezes    Cover your mouth and nose with a tissue when you cough or sneeze  Throw used tissues in a lined trash can   Immediately wash your hands with soap and water for at least 20 seconds or, if soap and water are not available, clean your hands with an alcohol-based hand  that contains at least 60% alcohol  Clean your hands often    Wash your hands often with soap and water for at least 20 seconds, especially after blowing your nose, coughing, or sneezing; going to the bathroom; and before eating or preparing food  If soap and water are not readily available, use an alcohol-based hand  with at least 60% alcohol, covering all surfaces of your hands and rubbing them together until they feel dry  Soap and water are the best option if hands are visibly dirty  Avoid touching your eyes, nose, and mouth with unwashed hands  Avoid sharing personal household items    You should not share dishes, drinking glasses, cups, eating utensils, towels, or bedding with other people or pets in your home  After using these items, they should be washed thoroughly with soap and water  Clean all “high-touch” surfaces everyday    High touch surfaces include counters, tabletops, doorknobs, bathroom fixtures, toilets, phones, keyboards, tablets, and bedside tables  Also, clean any surfaces that may have blood, stool, or body fluids on them  Use a household cleaning spray or wipe, according to the label instructions  Labels contain instructions for safe and effective use of the cleaning product including precautions you should take when applying the product, such as wearing gloves and making sure you have good ventilation during use of the product  Monitor your symptoms    Seek prompt medical attention if your illness is worsening (e g , difficulty breathing)  Before seeking care, call your healthcare provider and tell them that you have, or are being evaluated for, COVID-19  Put on a facemask before you enter the facility  These steps will help the healthcare provider’s office to keep other people in the office or waiting room from getting infected or exposed  Ask your healthcare provider to call the local or [facility] health department   Persons who are placed under active monitoring or facilitated self-monitoring should follow instructions provided by their local health department or occupational health professionals, as appropriate  If you have a medical emergency and need to call 911, notify the dispatch personnel that you have, or are being evaluated for COVID-19  If possible, put on a facemask before emergency medical services arrive  Discontinuing home isolation    Patients with confirmed COVID-19 should remain under home isolation precautions until the following conditions are met:   - They have had no fever for at least 24 hours (that is one full day of no fever without the use medicine that reduces fevers)  AND  - other symptoms have improved (for example, when their cough or shortness of breath have improved)  AND  - If had mild or moderate illness, at least 10 days have passed since their symptoms first appeared or if severe illness (needed oxygen) or immunosuppressed, at least 20 days have passed since symptoms first appeared  Patients with confirmed COVID-19 should also notify close contacts (including their workplace) and ask that they self-quarantine  Currently, close contact is defined as being within 6 feet for 15 minutes or more from the period 24 hours starting 48 hours before symptom onset to the time at which the patient went into isolation  Close contacts of patients diagnosed with COVID-19 should be instructed by the patient to self-quarantine for 14 days from the last time of their last contact with the patient  Source: Rosanna perez           Disposition:     Discussed symptom directed medication options with patient  Discussed vitamin D, vitamin C, and/or zinc supplementation with patient  Patient meets criteria for PAXLOVID and they have been counseled appropriately according to EUA documentation released by the FDA   After discussion, patient agrees to treatment  Corbin Paul is an investigational medicine used to treat mild-to-moderate COVID-19 in adults and children (15years of age and older weighing at least 80 pounds (40 kg)) with positive results of direct SARS-CoV-2 viral testing, and who are at high risk for progression to severe COVID-19, including hospitalization or death  PAXLOVID is investigational because it is still being studied  There is limited information about the safety and effectiveness of using PAXLOVID to treat people with mild-to-moderate COVID-19  The FDA has authorized the emergency use of PAXLOVID for the treatment of mild-tomoderate COVID-19 in adults and children (15years of age and older weighing at least 80 pounds (40 kg)) with a positive test for the virus that causes COVID-19, and who are at high risk for progression to severe COVID-19, including hospitalization or death, under an EUA  What should I tell my healthcare provider before I take PAXLOVID? Tell your healthcare provider if you:  - Have any allergies  - Have liver or kidney disease  - Are pregnant or plan to become pregnant  - Are breastfeeding a child  - Have any serious illnesses    Tell your healthcare provider about all the medicines you take, including prescription and over-the-counter medicines, vitamins, and herbal supplements  Some medicines may interact with PAXLOVID and may cause serious side effects  Keep a list of your medicines to show your healthcare provider and pharmacist when you get a new medicine  You can ask your healthcare provider or pharmacist for a list of medicines that interact with PAXLOVID  Do not start taking a new medicine without telling your healthcare provider  Your healthcare provider can tell you if it is safe to take PAXLOVID with other medicines  Tell your healthcare provider if you are taking combined hormonal contraceptive  PAXLOVID may affect how your birth control pills work   Females who are able to become pregnant should use another effective alternative form of contraception or an additional barrier method of contraception  Talk to your healthcare provider if you have any questions about contraceptive methods that might be right for you  How do I take PAXLOVID? PAXLOVID consists of 2 medicines: nirmatrelvir and ritonavir  - Take 2 pink tablets of nirmatrelvir with 1 white tablet of ritonavir by mouth 2 times each day (in the morning and in the evening) for 5 days  For each dose, take all 3 tablets at the same time  - If you have kidney disease, talk to your healthcare provider  You may need a different dose  - Swallow the tablets whole  Do not chew, break, or crush the tablets  - Take PAXLOVID with or without food  - Do not stop taking PAXLOVID without talking to your healthcare provider, even if you feel better  - If you miss a dose of PAXLOVID within 8 hours of the time it is usually taken, take it as soon as you remember  If you miss a dose by more than 8 hours, skip the missed dose and take the next dose at your regular time  Do not take 2 doses of PAXLOVID at the same time  - If you take too much PAXLOVID, call your healthcare provider or go to the nearest hospital emergency room right away  - If you are taking a ritonavir- or cobicistat-containing medicine to treat hepatitis C or Human Immunodeficiency Virus (HIV), you should continue to take your medicine as prescribed by your healthcare provider   - Talk to your healthcare provider if you do not feel better or if you feel worse after 5 days  Who should generally not take PAXLOVID? Do not take PAXLOVID if:  You are allergic to nirmatrelvir, ritonavir, or any of the ingredients in PAXLOVID      You are taking any of the following medicines:  - Alfuzosin  - Pethidine, piroxicam, propoxyphene  - Ranolazine  - Amiodarone, dronedarone, flecainide, propafenone, quinidine  - Colchicine  - Lurasidone, pimozide, clozapine  - Dihydroergotamine, ergotamine, methylergonovine  - Lovastatin, simvastatin  - Sildenafil (Revatio®) for pulmonary arterial hypertension (PAH)  - Triazolam, oral midazolam  - Apalutamide  - Carbamazepine, phenobarbital, phenytoin  - Rifampin  - St  Jonah’s Wort (hypericum perforatum)    What are the important possible side effects of PAXLOVID? Possible side effects of PAXLOVID are:  - Liver Problems  Tell your healthcare provider right away if you have any of these signs and symptoms of liver problems: loss of appetite, yellowing of your skin and the whites of eyes (jaundice), dark-colored urine, pale colored stools and itchy skin, stomach area (abdominal) pain  - Resistance to HIV Medicines  If you have untreated HIV infection, PAXLOVID may lead to some HIV medicines not working as well in the future  - Other possible side effects include: altered sense of taste, diarrhea, high blood pressure, or muscle aches    These are not all the possible side effects of PAXLOVID  Not many people have taken PAXLOVID  Serious and unexpected side effects may happen  Bianca Rey is still being studied, so it is possible that all of the risks are not known at this time  What other treatment choices are there? Like Dara Winston may allow for the emergency use of other medicines to treat people with COVID-19  Go to https://apiOmat/ for information on the emergency use of other medicines that are authorized by FDA to treat people with COVID-19  Your healthcare provider may talk with you about clinical trials for which you may be eligible  It is your choice to be treated or not to be treated with PAXLOVID  Should you decide not to receive it or for your child not to receive it, it will not change your standard medical care  What if I am pregnant or breastfeeding?     There is no experience treating pregnant women or breastfeeding mothers with PAXLOVID  For a mother and unborn baby, the benefit of taking PAXLOVID may be greater than the risk from the treatment  If you are pregnant, discuss your options and specific situation with your healthcare provider  It is recommended that you use effective barrier contraception or do not have sexual activity while taking PAXLOVID  If you are breastfeeding, discuss your options and specific situation with your healthcare provider  How do I report side effects with PAXLOVID? Contact your healthcare provider if you have any side effects that bother you or do not go away  Report side effects to FDA MedWatch at www fda gov/medwatch or call 9-901-GYE2364 or you can report side effects to [facility]  at the contact information provided below  Website Fax number Telephone number   [employer] [phone]     How should I store [address]? Store PAXLOVID tablets at room temperature between 68°F to 77°F (20°C to 25°C)  Full fact sheet for patients, parents, and caregivers can be found at: [employer]    I have spent 10 minutes directly with the patient  Greater than 50% of this time was spent in counseling/coordination of care regarding: diagnostic results, prognosis, risks and benefits of treatment options, instructions for management, patient and family education, importance of treatment compliance and risk factor reductions  Encounter provider: Cyd Goodpasture, MD     Provider located at: [address] [place] [id number] [phone]     Recent Visits  Date Type Provider Dept   01/11/23 Telemedicine Cyd Goodpasture, MD [address] [facility]   Showing recent visits within past 7 days and meeting all other requirements  Future Appointments  No visits were found meeting these conditions    Showing future appointments within next 150 days and meeting all other requirements     This virtual check-in was done via telephone and she agrees to proceed  Patient agrees to participate in a virtual check in via telephone or video visit instead of presenting to the office to address urgent/immediate medical needs  Patient is aware this is a billable service  She acknowledged consent and understanding of privacy and security of the video platform  The patient has agreed to participate and understands they can discontinue the visit at any time  After connecting through Telephone, the patient was identified by name and date of birth  Yadira Omer was informed that this was a telemedicine visit and that the exam was being conducted confidentially over secure lines  My office door was closed  No one else was in the room  Yadira Omer acknowledged consent and understanding of privacy and security of the telemedicine visit  I informed the patient that I have reviewed her record in Epic and presented the opportunity for her to ask any questions regarding the visit today  The patient agreed to participate  It was my intent to perform this visit via video technology but the patient was not able to do a video connection so the visit was completed via audio telephone only  Verification of patient location:  Patient is located in the following state in which I hold an active license: [place]    Subjective:   Yadira Omer is a 74 y o  female who is concerned about COVID-19  Patient's symptoms include fever, chills, fatigue, nasal congestion, rhinorrhea, anosmia, loss of taste, cough, abdominal pain, myalgias and headache           COVID-19 vaccination status: Fully vaccinated with booster    Exposure:   Contact with a person who is under investigation (PUI) for or who is positive for COVID-19 within the last 14 days?: No    Hospitalized recently for fever and/or lower respiratory symptoms?: No      Currently a healthcare worker that is involved in direct patient care?: No      Works in a special setting where the risk of COVID-19 transmission may be high? (this may include long-term care, correctional and intermediate facilities; homeless shelters; assisted-living facilities and group homes ): No      Resident in a special setting where the risk of COVID-19 transmission may be high? (this may include long-term care, correctional and intermediate facilities; homeless shelters; assisted-living facilities and group homes ): No      No results found for: SARSCOV2, [address] [facility], [address] [facility], [facility], [address] [facility], [address] [facility]    Review of Systems   Constitutional: Positive for chills, fatigue and fever  HENT: Positive for congestion and rhinorrhea  Respiratory: Positive for cough  Gastrointestinal: Positive for abdominal pain  Musculoskeletal: Positive for myalgias  Neurological: Positive for headaches  Current Outpatient Medications on File Prior to Visit   Medication Sig   • Acetaminophen-Codeine (TYLENOL WITH CODEINE #4 PO) q6h   • fluticasone (FLONASE) 50 mcg/act nasal spray 1 spray into each nostril 2 (two) times a day   • MECLIZINE HCL PO Take 6 25 mg by mouth as needed  • metoprolol tartrate (LOPRESSOR) 25 mg tablet Take 25 mg by mouth every 12 (twelve) hours       Objective: There were no vitals taken for this visit  Physical Exam  Constitutional:       Comments: Malnourished   Neurological:      Mental Status: She is alert and oriented to person, place, and time         Simona Juarez MD

## 2023-01-11 NOTE — ASSESSMENT & PLAN NOTE
Patient came in contact with COVID-19 patient now complains of coughing intractable intractable sinus congestion headache aches pains low-grade fever malaise treated with vitamin C vitamin D zinc and over-the-counter cough medicine and paxlovid  COVID-19 Home Care Guidelines    Your healthcare provider and/or public health staff have evaluated you and have determined that you do not need to remain in the hospital at this time  At this time you can be isolated at home where you will be monitored by staff from your local or state health department  You should carefully follow the prevention and isolation steps below until a healthcare provider or local or state health department says that you can return to your normal activities  Stay home except to get medical care    People who are mildly ill with COVID-19 are able to isolate at home during their illness  You should restrict activities outside your home, except for getting medical care  Do not go to work, school, or public areas  Avoid using public transportation, ride-sharing, or taxis  Separate yourself from other people and animals in your home    People: As much as possible, you should stay in a specific room and away from other people in your home  Also, you should use a separate bathroom, if available  Animals: You should restrict contact with pets and other animals while you are sick with COVID-19, just like you would around other people  Although there have not been reports of pets or other animals becoming sick with COVID-19, it is still recommended that people sick with COVID-19 limit contact with animals until more information is known about the virus  When possible, have another member of your household care for your animals while you are sick  If you are sick with COVID-19, avoid contact with your pet, including petting, snuggling, being kissed or licked, and sharing food   If you must care for your pet or be around animals while you are sick, wash your hands before and after you interact with pets and wear a facemask  See COVID-19 and Animals for more information  Call ahead before visiting your doctor    If you have a medical appointment, call the healthcare provider and tell them that you have or may have COVID-19  This will help the healthcare provider’s office take steps to keep other people from getting infected or exposed  Wear a facemask    You should wear a facemask when you are around other people (e g , sharing a room or vehicle) or pets and before you enter a healthcare provider’s office  If you are not able to wear a facemask (for example, because it causes trouble breathing), then people who live with you should not stay in the same room with you, or they should wear a facemask if they enter your room  Cover your coughs and sneezes    Cover your mouth and nose with a tissue when you cough or sneeze  Throw used tissues in a lined trash can  Immediately wash your hands with soap and water for at least 20 seconds or, if soap and water are not available, clean your hands with an alcohol-based hand  that contains at least 60% alcohol  Clean your hands often    Wash your hands often with soap and water for at least 20 seconds, especially after blowing your nose, coughing, or sneezing; going to the bathroom; and before eating or preparing food  If soap and water are not readily available, use an alcohol-based hand  with at least 60% alcohol, covering all surfaces of your hands and rubbing them together until they feel dry  Soap and water are the best option if hands are visibly dirty  Avoid touching your eyes, nose, and mouth with unwashed hands  Avoid sharing personal household items    You should not share dishes, drinking glasses, cups, eating utensils, towels, or bedding with other people or pets in your home  After using these items, they should be washed thoroughly with soap and water      Clean all “high-touch” surfaces everyday    High touch surfaces include counters, tabletops, doorknobs, bathroom fixtures, toilets, phones, keyboards, tablets, and bedside tables  Also, clean any surfaces that may have blood, stool, or body fluids on them  Use a household cleaning spray or wipe, according to the label instructions  Labels contain instructions for safe and effective use of the cleaning product including precautions you should take when applying the product, such as wearing gloves and making sure you have good ventilation during use of the product  Monitor your symptoms    Seek prompt medical attention if your illness is worsening (e g , difficulty breathing)  Before seeking care, call your healthcare provider and tell them that you have, or are being evaluated for, COVID-19  Put on a facemask before you enter the facility  These steps will help the healthcare provider’s office to keep other people in the office or waiting room from getting infected or exposed  Ask your healthcare provider to call the local or [facility] health department  Persons who are placed under active monitoring or facilitated self-monitoring should follow instructions provided by their local health department or occupational health professionals, as appropriate  If you have a medical emergency and need to call 911, notify the dispatch personnel that you have, or are being evaluated for COVID-19  If possible, put on a facemask before emergency medical services arrive      Discontinuing home isolation    Patients with confirmed COVID-19 should remain under home isolation precautions until the following conditions are met:   - They have had no fever for at least 24 hours (that is one full day of no fever without the use medicine that reduces fevers)  AND  - other symptoms have improved (for example, when their cough or shortness of breath have improved)  AND  - If had mild or moderate illness, at least 10 days have passed since their symptoms first appeared or if severe illness (needed oxygen) or immunosuppressed, at least 20 days have passed since symptoms first appeared  Patients with confirmed COVID-19 should also notify close contacts (including their workplace) and ask that they self-quarantine  Currently, close contact is defined as being within 6 feet for 15 minutes or more from the period 24 hours starting 48 hours before symptom onset to the time at which the patient went into isolation  Close contacts of patients diagnosed with COVID-19 should be instructed by the patient to self-quarantine for 14 days from the last time of their last contact with the patient       Source: [employer]

## 2023-01-14 NOTE — ASSESSMENT & PLAN NOTE
Malnutrition Findings:       Patient high risk due to for concurrent malnutrition this is video televisit showed the current height weight not available but from the previous records patient is malnourished encouraged nutritional supplements and nutritionist consult                          BMI Findings: There is no height or weight on file to calculate BMI

## 2023-01-19 ENCOUNTER — TELEPHONE (OUTPATIENT)
Dept: INTERNAL MEDICINE CLINIC | Facility: CLINIC | Age: 73
End: 2023-01-19

## 2023-02-01 ENCOUNTER — OFFICE VISIT (OUTPATIENT)
Dept: AUDIOLOGY | Facility: CLINIC | Age: 73
End: 2023-02-01

## 2023-02-01 DIAGNOSIS — H90.6 MIXED CONDUCTIVE AND SENSORINEURAL HEARING LOSS OF BOTH EARS: Primary | ICD-10-CM

## 2023-02-02 NOTE — PROGRESS NOTES
ADULT HEARING EVALUATION - [place] [address] AUDIOLOGY      Patient Name: Nikita Flynn   MRN:  [id number]   :  1950   Age: 74 y o  Gender: female   DOS: 2023     HISTORY:     Nikita Flynn, a 74 y o  female, was seen on 2023 at the referral of Dr Roque Carson for an audiometric evaluation  Ms Abelardo Carter was unaccompanied to today's visit  Ms Abelardo Carter is a new patient to our practice  Today, Ms Tovar's primary complaint(s) was extreme difficulty hearing  Onset of symptoms reportedly began years ago but have significantly gotten worse since an episode of vertigo in 2022  History of otitis was negative  Ms Abelardo Carter has no history of ear surgeries  Family history of hearing loss was negative  Communication difficulties include not hearing in most listening environments, needing her TV volume extremely high, difficulty on the telephone, and overall extreme communication difficulties  History of noise exposure is negative  Other documented medical history states that Ms Abelardo Carter  has a past medical history of Abdominal pain, Back pain, Chest pain, COPD (chronic obstructive pulmonary disease) (HCC), Dizziness, Fibromyalgia, GERD (gastroesophageal reflux disease), Hypertension, Inflammatory bowel disease, Lightheadedness, RVOT ventricular tachycardia, and SOB (shortness of breath)  Ms Abelardo Carter has not had her hearing tested previously       RESULTS:    Otoscopic Evaluation:   Right Ear: Unremarkable, canal clear   Left Ear: Unremarkable, canal clear    Tympanometry:   Right Ear: Type B, no tympanometric compliance   Left Ear:  Type B, no tympanometric compliance    Audiometry:  Conventional pure tone audiometry from 250 - 8000 Hz  obtained with good reliability and revealed the following:     Right Ear: moderately-severe to severe mixed hearing loss (MHL)   Left Ear: moderately-severe to severe mixed hearing loss (MHL)     Distortion Product Otoacoustic Emissions (DPOAEs)   Right Ear: DNT   Left Ear: DNT    Speech Audiometry:    Speech Reception (SRT)   Right Ear: 55 dB HL   Left Ear: 60 dB HL    Word Recognition Scores (WRS):  Right Ear: excellent (100 % correct) at [facility] presentation level of 80 dB HL   Left Ear: good (84 % correct) at [facility] presentation level of 80 dB HL   Stimuli: [id number]    IMPRESSIONS:  Mrs Kerry Paredes has a bilateral moderately-severe to severe mixed hearing loss  This hearing loss can and will interfere with all listening and communicative situations  The results of today's findings were reviewed with Ms Tovar and her hearing thresholds were explained at length  Treatment options, including amplification and communication strategies, were discussed as appropriate  Ms Kerry Paredes voiced understanding of her test results and had no further questions  RECOMMENDATIONS:    1 ) Follow-up with referring provider, Dr Conor Corcoran for otologic management of the conductive component of the bilateral hearing loss  2 ) Return for an audiologic re-evaluation after otologic management  3 ) Hearing Aid Evaluation to obtain binaural hearing aids once the hearing loss is stable  *see attached audiogram*    It was a pleasure working with Ms Kerry Paredes today  Thank you for referring this patient       Ilana Simon , [facility]  Clinical Audiologist    [id number] [address] [id number]

## 2023-02-21 ENCOUNTER — RA CDI HCC (OUTPATIENT)
Dept: OTHER | Facility: HOSPITAL | Age: 73
End: 2023-02-21

## 2023-02-21 NOTE — PROGRESS NOTES
[facility]  coding opportunities       Chart reviewed, no opportunity found:   [address]        Patients Insurance     Medicare Insurance: [employer]

## 2023-03-07 ENCOUNTER — OFFICE VISIT (OUTPATIENT)
Dept: INTERNAL MEDICINE CLINIC | Facility: CLINIC | Age: 73
End: 2023-03-07

## 2023-03-07 VITALS
WEIGHT: 97 LBS | OXYGEN SATURATION: 98 % | DIASTOLIC BLOOD PRESSURE: 72 MMHG | BODY MASS INDEX: 17.19 KG/M2 | RESPIRATION RATE: 16 BRPM | SYSTOLIC BLOOD PRESSURE: 110 MMHG | HEART RATE: 70 BPM | TEMPERATURE: 97.6 F | HEIGHT: 63 IN

## 2023-03-07 DIAGNOSIS — E44.1 MILD PROTEIN-CALORIE MALNUTRITION (HCC): ICD-10-CM

## 2023-03-07 DIAGNOSIS — I10 PRIMARY HYPERTENSION: ICD-10-CM

## 2023-03-07 DIAGNOSIS — E55.9 VITAMIN D DEFICIENCY: ICD-10-CM

## 2023-03-07 DIAGNOSIS — Z13.1 DIABETES MELLITUS SCREENING: ICD-10-CM

## 2023-03-07 DIAGNOSIS — Z00.00 ENCOUNTER FOR SUBSEQUENT ANNUAL WELLNESS VISIT IN MEDICARE PATIENT: ICD-10-CM

## 2023-03-07 DIAGNOSIS — J41.0 SIMPLE CHRONIC BRONCHITIS (HCC): ICD-10-CM

## 2023-03-07 DIAGNOSIS — R73.9 HYPERGLYCEMIA: ICD-10-CM

## 2023-03-07 DIAGNOSIS — N39.41 URGE URINARY INCONTINENCE: ICD-10-CM

## 2023-03-07 DIAGNOSIS — I77.1 SUBCLAVIAN ARTERY STENOSIS (HCC): Primary | ICD-10-CM

## 2023-03-07 DIAGNOSIS — E78.2 MIXED HYPERLIPIDEMIA: ICD-10-CM

## 2023-03-07 DIAGNOSIS — Z13.6 SCREENING FOR CARDIOVASCULAR CONDITION: ICD-10-CM

## 2023-03-07 DIAGNOSIS — Z13.0 SCREENING FOR DEFICIENCY ANEMIA: ICD-10-CM

## 2023-03-07 DIAGNOSIS — E53.8 B12 DEFICIENCY: ICD-10-CM

## 2023-03-07 NOTE — ASSESSMENT & PLAN NOTE
CTA of the head and neck done on August 7, 2022 in the emergency room work-up for dizziness and suspected TIA showed left subclavian artery stenosis 80% asymptomatic recommended to be seen by vascular surgery patient prefers to be seen by cardiology patient is followed by cardiology continue aspirin patient is reluctant to take statin

## 2023-03-07 NOTE — ASSESSMENT & PLAN NOTE
No difficulty breathing no wheezing at present time patient quit smoking more than 10 years ago at present time no symptoms no need for inhaler stable

## 2023-03-07 NOTE — ASSESSMENT & PLAN NOTE
Complete annual wellness exam done for counseling screening follow-up recommendations see attached encounter

## 2023-03-07 NOTE — PATIENT INSTRUCTIONS
Medicare Preventive Visit Patient Instructions  Thank you for completing your Welcome to Medicare Visit or Medicare Annual Wellness Visit today  Your next wellness visit will be due in one year (3/7/2024)  The screening/preventive services that you may require over the next 5-10 years are detailed below  Some tests may not apply to you based off risk factors and/or age  Screening tests ordered at today's visit but not completed yet may show as past due  Also, please note that scanned in results may not display below  Preventive Screenings:  Service Recommendations Previous Testing/Comments   Colorectal Cancer Screening  * Colonoscopy    * Fecal Occult Blood Test (FOBT)/Fecal Immunochemical Test (FIT)  * Fecal DNA/Cologuard Test  * Flexible Sigmoidoscopy Age: 39-70 years old   Colonoscopy: every 10 years (may be performed more frequently if at higher risk)  OR  FOBT/FIT: every 1 year  OR  Cologuard: every 3 years  OR  Sigmoidoscopy: every 5 years  Screening may be recommended earlier than age 39 if at higher risk for colorectal cancer  Also, an individualized decision between you and your healthcare provider will decide whether screening between the ages of 74-80 would be appropriate  Colonoscopy: Not on file  FOBT/FIT: Not on file  Cologuard: Not on file  Sigmoidoscopy: Not on file          Breast Cancer Screening Age: 36 years old  Frequency: every 1-2 years  Not required if history of left and right mastectomy Mammogram: Not on file        Cervical Cancer Screening Between the ages of 21-29, pap smear recommended once every 3 years  Between the ages of 33-67, can perform pap smear with HPV co-testing every 5 years     Recommendations may differ for women with a history of total hysterectomy, cervical cancer, or abnormal pap smears in past  Pap Smear: Not on file        Hepatitis C Screening Once for adults born between [facility]  More frequently in patients at high risk for Hepatitis C Hep C Antibody: Not on file        Diabetes Screening 1-2 times per year if you're at risk for diabetes or have pre-diabetes Fasting glucose: No results in last 5 years (No results in last 5 years)  A1C: No results in last 5 years (No results in last 5 years)      Cholesterol Screening Once every 5 years if you don't have a lipid disorder  May order more often based on risk factors  Lipid panel: Not on file          Other Preventive Screenings Covered by Medicare:  1  Abdominal Aortic Aneurysm (AAA) Screening: covered once if your at risk  You're considered to be at risk if you have a family history of AAA  2  Lung Cancer Screening: covers low dose CT scan once per year if you meet all of the following conditions: (1) Age 50-69; (2) No signs or symptoms of lung cancer; (3) Current smoker or have quit smoking within the last 15 years; (4) You have a tobacco smoking history of at least 20 pack years (packs per day multiplied by number of years you smoked); (5) You get a written order from a healthcare provider  3  Glaucoma Screening: covered annually if you're considered high risk: (1) You have diabetes OR (2) Family history of glaucoma OR (3)  aged 48 and older OR (3)  American aged 72 and older  3  Osteoporosis Screening: covered every 2 years if you meet one of the following conditions: (1) You're estrogen deficient and at risk for osteoporosis based off medical history and other findings; (2) Have a vertebral abnormality; (3) On glucocorticoid therapy for more than 3 months; (4) Have primary hyperparathyroidism; (5) On osteoporosis medications and need to assess response to drug therapy  · Last bone density test (DXA Scan): Not on file  5  HIV Screening: covered annually if you're between the age of 12-76  Also covered annually if you are younger than 13 and older than 72 with risk factors for HIV infection   For pregnant patients, it is covered up to 3 times per pregnancy  Immunizations:  Immunization Recommendations   Influenza Vaccine Annual influenza vaccination during flu season is recommended for all persons aged >= 6 months who do not have contraindications   Pneumococcal Vaccine   * Pneumococcal conjugate vaccine = PCV13 (Prevnar 13), PCV15 (Vaxneuvance), PCV20 (Prevnar 20)  * Pneumococcal polysaccharide vaccine = PPSV23 (Pneumovax) Adults 25-60 years old: 1-3 doses may be recommended based on certain risk factors  Adults 72 years old: 1-2 doses may be recommended based off what pneumonia vaccine you previously received   Hepatitis B Vaccine 3 dose series if at intermediate or high risk (ex: diabetes, end stage renal disease, liver disease)   Tetanus (Td) Vaccine - COST NOT COVERED BY MEDICARE PART B Following completion of primary series, a booster dose should be given every 10 years to maintain immunity against tetanus  Td may also be given as tetanus wound prophylaxis  Tdap Vaccine - COST NOT COVERED BY MEDICARE PART B Recommended at least once for all adults  For pregnant patients, recommended with each pregnancy  Shingles Vaccine (Shingrix) - COST NOT COVERED BY MEDICARE PART B  2 shot series recommended in those aged 48 and above     Health Maintenance Due:      Topic Date Due   • Hepatitis C Screening  Never done   • Breast Cancer Screening: Mammogram  Never done   • Colorectal Cancer Screening  Never done     Immunizations Due:      Topic Date Due   • COVID-19 Vaccine (1) Never done   • Pneumococcal Vaccine: 65+ Years (1 - PCV) Never done   • Influenza Vaccine (1) Never done     Advance Directives   What are advance directives? Advance directives are legal documents that state your wishes and plans for medical care  These plans are made ahead of time in case you lose your ability to make decisions for yourself  Advance directives can apply to any medical decision, such as the treatments you want, and if you want to donate organs     What are the types of advance directives? There are many types of advance directives, and each state has rules about how to use them  You may choose a combination of any of the following:  · Living will: This is a written record of the treatment you want  You can also choose which treatments you do not want, which to limit, and which to stop at a certain time  This includes surgery, medicine, IV fluid, and tube feedings  · Durable power of  for healthcare [facility] [place]): This is a written record that states who you want to make healthcare choices for you when you are unable to make them for yourself  This person, called a proxy, is usually a family member or a friend  You may choose more than 1 proxy  · Do not resuscitate (DNR) order:  A DNR order is used in case your heart stops beating or you stop breathing  It is a request not to have certain forms of treatment, such as CPR  A DNR order may be included in other types of advance directives  · Medical directive: This covers the care that you want if you are in a coma, near death, or unable to make decisions for yourself  You can list the treatments you want for each condition  Treatment may include pain medicine, surgery, blood transfusions, dialysis, IV or tube feedings, and a ventilator (breathing machine)  · Values history: This document has questions about your views, beliefs, and how you feel and think about life  This information can help others choose the care that you would choose  Why are advance directives important? An advance directive helps you control your care  Although spoken wishes may be used, it is better to have your wishes written down  Spoken wishes can be misunderstood, or not followed  Treatments may be given even if you do not want them  An advance directive may make it easier for your family to make difficult choices about your care  Urinary Incontinence   Urinary incontinence (UI)  is when you lose control of your bladder   UI develops because your bladder cannot store or empty urine properly  The 3 most common types of UI are stress incontinence, urge incontinence, or both  Medicines:   · May be given to help strengthen your bladder control  Report any side effects of medication to your healthcare provider  Do pelvic muscle exercises often:  Your pelvic muscles help you stop urinating  Squeeze these muscles tight for 5 seconds, then relax for 5 seconds  Gradually work up to squeezing for 10 seconds  Do 3 sets of 15 repetitions a day, or as directed  This will help strengthen your pelvic muscles and improve bladder control  Train your bladder:  Go to the bathroom at set times, such as every 2 hours, even if you do not feel the urge to go  You can also try to hold your urine when you feel the urge to go  For example, hold your urine for 5 minutes when you feel the urge to go  As that becomes easier, hold your urine for 10 minutes  Self-care:   · Keep a UI record  Write down how often you leak urine and how much you leak  Make a note of what you were doing when you leaked urine  · Drink liquids as directed  You may need to limit the amount of liquid you drink to help control your urine leakage  Do not drink any liquid right before you go to bed  Limit or do not have drinks that contain caffeine or alcohol  · Prevent constipation  Eat a variety of high-fiber foods  Good examples are high-fiber cereals, beans, vegetables, and whole-grain breads  Walking is the best way to trigger your intestines to have a bowel movement  · Exercise regularly and maintain a healthy weight  Weight loss and exercise will decrease pressure on your bladder and help you control your leakage  · Use a catheter as directed  to help empty your bladder  A catheter is a tiny, plastic tube that is put into your bladder to drain your urine  · Go to behavior therapy as directed  Behavior therapy may be used to help you learn to control your urge to urinate      Cigarette Smoking and Your Health   Risks to your health if you smoke:  Nicotine and other chemicals found in tobacco damage every cell in your body  Even if you are a light smoker, you have an increased risk for cancer, heart disease, and lung disease  If you are pregnant or have diabetes, smoking increases your risk for complications  Benefits to your health if you stop smoking:   · You decrease respiratory symptoms such as coughing, wheezing, and shortness of breath  · You reduce your risk for cancers of the lung, mouth, throat, kidney, bladder, pancreas, stomach, and cervix  If you already have cancer, you increase the benefits of chemotherapy  You also reduce your risk for cancer returning or a second cancer from developing  · You reduce your risk for heart disease, blood clots, heart attack, and stroke  · You reduce your risk for lung infections, and diseases such as pneumonia, asthma, chronic bronchitis, and emphysema  · Your circulation improves  More oxygen can be delivered to your body  If you have diabetes, you lower your risk for complications, such as kidney, artery, and eye diseases  You also lower your risk for nerve damage  Nerve damage can lead to amputations, poor vision, and blindness  · You improve your body's ability to heal and to fight infections  For more information and support to stop smoking:   · PPI  Phone: 1- 688 - 936-8091  Web Address: www [employer]  Underweight  Underweight is defined as having a body mass index (BMI) of less than 18 5 kg/m2   Anorexia  is a loss of appetite, decreased food intake, or both  Your appetite naturally decreases as you get older  You also get full faster than you used to  This occurs because your body needs less energy  Other body changes can also lead to a decreased appetite  Even though some appetite loss is normal, you still need to get enough calories and nutrients to keep you healthy   You can start to lose too much weight if you do not eat as much food as your body needs  Unwanted weight loss can cause health problems, or worsen health problems you already have  You can also become dehydrated if you do not drink enough liquid  How to eat healthy and get enough nutrients:   · Choose healthy foods  Eat a variety of fruits, vegetables, whole grains, low-fat dairy foods, lean meats, and other protein foods  Limit foods high in fat, sugar, and salt  Limit or avoid alcohol as directed  Work with a dietitian to help you plan your meals if you need to follow a special diet  A dietitian can also teach you how to modify foods if you have trouble chewing or swallowing  · Snack on healthy foods between meals  if you only eat a small amount during meals  Snacks provide extra healthy nutrients and calories between meals  Examples include fruit, cheese, and whole grain crackers  · Drink liquids as directed  to avoid dehydration  Drink liquids between meals if they cause you to get full too quickly during meals  Ask how much liquid to drink each day and which liquids are best for you  · Use herbs, spices, and flavor enhancers to add flavor to foods  Avoid using herbs and spice blends that also contain sodium  Ask your healthcare provider or dietitian about flavor enhancers  Flavor enhancers with ham, natural manzanares, and roast beef flavors can also be sprinkled on food to add flavor  · Share meals with others as often as you can  Eating with others may help you to eat better during meal time  Ask family members, neighbors, or friends to join you for lunch  There are also senior centers where you can meet people, and share meals with them  · Ask family and friends for help  with shopping or preparing foods  Ask for a ride to the grocery store, if needed  © Copyright [employer] 2018 Information is for End User's use only and may not be sold, redistributed or otherwise used for commercial purposes   All illustrations and images included in CareNotes® are the copyrighted property of A [employer] M [employer]  or [address] [place], [place] [address]devi adarsh

## 2023-03-07 NOTE — ASSESSMENT & PLAN NOTE
Malnutrition Findings:          Patient's BMI of 17 8 encourage to gain weight nutritional supplement advised to see the nutritionist                       BMI Findings: Body mass index is 17 18 kg/m²

## 2023-03-07 NOTE — PROGRESS NOTES
Assessment and Plan:     Problem List Items Addressed This Visit        Respiratory    Chronic obstructive lung disease ([facility] [address] )     No difficulty breathing no wheezing at present time patient quit smoking more than 10 years ago at present time no symptoms no need for inhaler stable            Cardiovascular and Mediastinum    Hypertensive disorder     Blood pressure controlled continue low-salt diet metoprolol         Relevant Orders    Comprehensive metabolic panel    Urinalysis with microscopic    Subclavian artery stenosis (HCC) - Primary     CTA of the head and neck done on August 7, 2022 in the emergency room work-up for dizziness and suspected TIA showed left subclavian artery stenosis 80% asymptomatic recommended to be seen by vascular surgery patient prefers to be seen by cardiology patient is followed by cardiology continue aspirin patient is reluctant to take statin            Other    Mild protein-calorie malnutrition ([facility] [address] )     Malnutrition Findings:          Patient's BMI of 17 8 encourage to gain weight nutritional supplement advised to see the nutritionist                       BMI Findings: Body mass index is 17 18 kg/m²              Relevant Orders    Comprehensive metabolic panel   Other Visit Diagnoses     Urge urinary incontinence        Relevant Orders    Ambulatory referral to Urology    Screening for deficiency anemia        Relevant Orders    CBC and differential    Vitamin D deficiency        Relevant Orders    Vitamin D 25 hydroxy    Screening for cardiovascular condition        Relevant Orders    Comprehensive metabolic panel    Diabetes mellitus screening        Relevant Orders    Hemoglobin A1C    Mixed hyperlipidemia        Relevant Orders    Lipid Panel with Direct LDL reflex    Hyperglycemia        Relevant Orders    Hemoglobin A1C           Preventive health issues were discussed with patient, and age appropriate screening tests were ordered as noted in patient's After Visit Summary  Personalized health advice and appropriate referrals for health education or preventive services given if needed, as noted in patient's After Visit Summary  History of Present Illness:     Patient presents for a Medicare Wellness Visit    Patient came in for follow-up for chronic medical condition especially chronic intractable pain cervical spine radiating upper extremity lower back pain thoracic spine pain due to degenerative joint disease also complaining of urinary frequency almost every 2 hours at nighttime and and also came in for annual wellness exam  Complete annual wellness exam done for all the counseling screening follow-up recommendations see attached encounter     Patient Care Team:  Bg Oconnor MD as PCP - General (Internal Medicine)  Bg Oconnor MD as PCP - [address] [place] [address] (RTE)     Review of Systems:     Review of Systems   Constitutional: Negative for activity change, appetite change, chills, diaphoresis, fatigue, fever and unexpected weight change  HENT: Negative for congestion, dental problem, drooling, ear discharge, ear pain, facial swelling, hearing loss, mouth sores, nosebleeds, postnasal drip, rhinorrhea, sinus pressure, sneezing, sore throat, tinnitus, trouble swallowing and voice change  Eyes: Negative for photophobia, pain, discharge, redness, itching and visual disturbance  Respiratory: Negative for apnea, cough, choking, chest tightness, shortness of breath, wheezing and stridor  Cardiovascular: Negative for chest pain, palpitations and leg swelling  Gastrointestinal: Negative for abdominal distention, abdominal pain, anal bleeding, blood in stool, constipation, diarrhea, nausea, rectal pain and vomiting  Endocrine: Negative for cold intolerance, heat intolerance, polydipsia, polyphagia and polyuria  Genitourinary: Positive for frequency   Negative for decreased urine volume, difficulty urinating, dysuria, enuresis, flank pain, genital sores, hematuria and urgency  Musculoskeletal: Positive for arthralgias, gait problem, joint swelling, myalgias, neck pain and neck stiffness  Negative for back pain  Skin: Negative for color change, pallor, rash and wound  Allergic/Immunologic: Negative  Negative for environmental allergies, food allergies and immunocompromised state  Neurological: Positive for dizziness and light-headedness  Negative for seizures, syncope, facial asymmetry, speech difficulty, numbness and headaches  Psychiatric/Behavioral: Negative for agitation, behavioral problems, confusion, decreased concentration, dysphoric mood, hallucinations, self-injury, sleep disturbance and suicidal ideas  The patient is nervous/anxious  The patient is not hyperactive  Problem List:     Patient Active Problem List   Diagnosis   • Anxiety disorder   • Chronic back pain   • Chronic obstructive lung disease (HCC)   • Depressive disorder   • Hypertensive disorder   • Irritable bowel syndrome   • Lumbar post-laminectomy syndrome   • Lumbar radiculopathy   • Vertigo   • Cervical radiculopathy   • B12 deficiency   • Mild protein-calorie malnutrition (HCC)   • Acute superficial gastritis without hemorrhage   • COVID-19   • Subclavian artery stenosis (HCC)      Past Medical and Surgical History:     Past Medical History:   Diagnosis Date   • Abdominal pain    • Back pain    • Chest pain    • COPD (chronic obstructive pulmonary disease) (HCC)    • Dizziness    • Fibromyalgia    • GERD (gastroesophageal reflux disease)    • Hypertension    • Inflammatory bowel disease    • Lightheadedness    • RVOT ventricular tachycardia    • SOB (shortness of breath)      Past Surgical History:   Procedure Laterality Date   • LUMBAR LAMINECTOMY     • SPINE SURGERY        Family History:     Family History   Problem Relation Age of Onset   • Heart disease Mother       Social History:     Social History     Socioeconomic History   • Marital status:   Spouse name: None   • Number of children: None   • Years of education: None   • Highest education level: None   Occupational History   • None   Tobacco Use   • Smoking status: Every Day     Packs/day: 0 25     Types: Cigarettes   • Smokeless tobacco: Never   Vaping Use   • Vaping Use: Never used   Substance and Sexual Activity   • Alcohol use: No   • Drug use: No   • Sexual activity: None   Other Topics Concern   • None   Social History Narrative   • None     Social Determinants of Health     Financial Resource Strain: Low Risk    • Difficulty of Paying Living Expenses: Not very hard   Food Insecurity: Not on file   Transportation Needs: No Transportation Needs   • Lack of Transportation (Medical): No   • Lack of Transportation (Non-Medical): No   Physical Activity: Not on file   Stress: Not on file   Social Connections: Not on file   Intimate Partner Violence: Not on file   Housing Stability: Not on file      Medications and Allergies:     Current Outpatient Medications   Medication Sig Dispense Refill   • Acetaminophen-Codeine (TYLENOL WITH CODEINE #4 PO) q6h     • fluticasone (FLONASE) 50 mcg/act nasal spray 1 spray into each nostril 2 (two) times a day     • MECLIZINE HCL PO Take 6 25 mg by mouth as needed  • metoprolol tartrate (LOPRESSOR) 25 mg tablet Take 25 mg by mouth every 12 (twelve) hours       No current facility-administered medications for this visit  Allergies   Allergen Reactions   • Amoxicillin-Pot Clavulanate Diarrhea     Reaction Date: 29Feb2004;    • Aspirin GI Intolerance   • Epinephrine    • Penicillins       Immunizations: There is no immunization history on file for this patient     Health Maintenance:         Topic Date Due   • Hepatitis C Screening  Never done   • Breast Cancer Screening: Mammogram  Never done   • Colorectal Cancer Screening  Never done         Topic Date Due   • COVID-19 Vaccine (1) Never done   • Pneumococcal Vaccine: 65+ Years (1 - PCV) Never done   • Influenza Vaccine (1) Never done      Medicare Screening Tests and Risk Assessments:     Alex Saleh is here for her Subsequent Wellness visit  Health Risk Assessment:   Patient rates overall health as good  Patient feels that their physical health rating is slightly worse  Patient is very satisfied with their life  Eyesight was rated as slightly worse  Hearing was rated as slightly worse  Patient feels that their emotional and mental health rating is slightly better  Patients states they are never, rarely angry  Patient states they are often unusually tired/fatigued  Pain experienced in the last 7 days has been a lot  Patient's pain rating has been 8/10  Patient states that she has experienced no weight loss or gain in last 6 months  Fall Risk Screening: In the past year, patient has experienced: history of falling in past year    Number of falls: 1  Injured during fall?: No    Feels unsteady when standing or walking?: No    Worried about falling?: No      Urinary Incontinence Screening:   Patient has leaked urine accidently in the last six months  Home Safety:  Patient does not have trouble with stairs inside or outside of their home  Patient has working smoke alarms and has working carbon monoxide detector  Home safety hazards include: none  Nutrition:   Gluten free diet  GERD has to watch what she eats  Medications:   Patient is currently taking over-the-counter supplements  OTC medications include: see medication list  Patient is able to manage medications  Activities of Daily Living (ADLs)/Instrumental Activities of Daily Living (IADLs):   Walk and transfer into and out of bed and chair?: Yes  Dress and groom yourself?: Yes    Bathe or shower yourself?: Yes    Feed yourself?  Yes  Do your laundry/housekeeping?: Yes  Manage your money, pay your bills and track your expenses?: Yes  Make your own meals?: Yes    Do your own shopping?: Yes    Previous Hospitalizations:   Any hospitalizations or ED visits within the last 12 months?: Yes    How many hospitalizations have you had in the last year?: 1-2    Hospitalization Comments: UTI and fell    Advance Care Planning:   Living will: No    Durable POA for healthcare: No    Advanced directive: No      Cognitive Screening:   Provider or family/friend/caregiver concerned regarding cognition?: No    PREVENTIVE SCREENINGS      Cardiovascular Screening:      Due for: Lipid Panel      Diabetes Screening:     General: Screening Current      Colorectal Cancer Screening:     General: Patient Declines      Cervical Cancer Screening:    General: Screening Not Indicated      Osteoporosis Screening:    General: Patient Declines      Lung Cancer Screening:     General: Screening Not Indicated      Hepatitis C Screening:    General: Patient Declines    Screening, Brief Intervention, and Referral to Treatment (SBIRT)    Screening      AUDIT-C Screenin) How often did you have a drink containing alcohol in the past year? never  2) How many drinks did you have on a typical day when you were drinking in the past year? 0  3) How often did you have 6 or more drinks on one occasion in the past year? never    AUDIT-C Score: 0  Interpretation: Score 0-2 (female): Negative screen for alcohol misuse    Single Item Drug Screening:  How often have you used an illegal drug (including marijuana) or a prescription medication for non-medical reasons in the past year? never    Single Item Drug Screen Score: 0  Interpretation: Negative screen for possible drug use disorder    No results found  Physical Exam:     /72   Pulse 70   Temp 97 6 °F (36 4 °C)   Resp 16   Ht 5' 3" (1 6 m)   Wt 44 kg (97 lb)   SpO2 98%   BMI 17 18 kg/m²     Physical Exam  Vitals and nursing note reviewed  Constitutional:       General: She is not in acute distress  Appearance: She is well-developed  She is ill-appearing  Comments: Malnourished   HENT:      Head: Normocephalic and atraumatic  Eyes:      Conjunctiva/sclera: Conjunctivae normal    Cardiovascular:      Rate and Rhythm: Normal rate and regular rhythm  Heart sounds: No murmur heard  Pulmonary:      Effort: Pulmonary effort is normal  No respiratory distress  Breath sounds: Normal breath sounds  Abdominal:      Palpations: Abdomen is soft  Tenderness: There is no abdominal tenderness  Musculoskeletal:         General: No swelling  Cervical back: Neck supple  Right lower leg: Edema present  Skin:     General: Skin is warm and dry  Capillary Refill: Capillary refill takes less than 2 seconds  Neurological:      Mental Status: She is alert and oriented to person, place, and time  Motor: Weakness present        Gait: Gait abnormal    Psychiatric:         Mood and Affect: Mood normal           Dawna Naryaanan MD

## 2023-03-23 ENCOUNTER — APPOINTMENT (OUTPATIENT)
Dept: LAB | Facility: CLINIC | Age: 73
End: 2023-03-23

## 2023-03-23 DIAGNOSIS — E78.2 MIXED HYPERLIPIDEMIA: ICD-10-CM

## 2023-03-23 DIAGNOSIS — E55.9 VITAMIN D DEFICIENCY: ICD-10-CM

## 2023-03-23 DIAGNOSIS — I10 PRIMARY HYPERTENSION: ICD-10-CM

## 2023-03-23 DIAGNOSIS — Z13.1 DIABETES MELLITUS SCREENING: ICD-10-CM

## 2023-03-23 DIAGNOSIS — Z13.0 SCREENING FOR DEFICIENCY ANEMIA: ICD-10-CM

## 2023-03-23 DIAGNOSIS — R73.9 HYPERGLYCEMIA: ICD-10-CM

## 2023-03-23 DIAGNOSIS — E53.8 B12 DEFICIENCY: ICD-10-CM

## 2023-03-23 DIAGNOSIS — Z13.6 SCREENING FOR CARDIOVASCULAR CONDITION: ICD-10-CM

## 2023-03-23 DIAGNOSIS — E44.1 MILD PROTEIN-CALORIE MALNUTRITION (HCC): ICD-10-CM

## 2023-03-23 LAB
25(OH)D3 SERPL-MCNC: 9.9 NG/ML (ref 30–100)
ALBUMIN SERPL BCP-MCNC: 3.8 G/DL (ref 3.5–5)
ALP SERPL-CCNC: 58 U/L (ref 46–116)
ALT SERPL W P-5'-P-CCNC: 22 U/L (ref 12–78)
ANION GAP SERPL CALCULATED.3IONS-SCNC: 3 MMOL/L (ref 4–13)
AST SERPL W P-5'-P-CCNC: 16 U/L (ref 5–45)
BACTERIA UR QL AUTO: NORMAL /HPF
BASOPHILS # BLD AUTO: 0.05 THOUSANDS/ÂΜL (ref 0–0.1)
BASOPHILS NFR BLD AUTO: 1 % (ref 0–1)
BILIRUB SERPL-MCNC: 0.91 MG/DL (ref 0.2–1)
BILIRUB UR QL STRIP: NEGATIVE
BUN SERPL-MCNC: 12 MG/DL (ref 5–25)
CALCIUM SERPL-MCNC: 9.2 MG/DL (ref 8.3–10.1)
CHLORIDE SERPL-SCNC: 106 MMOL/L (ref 96–108)
CHOLEST SERPL-MCNC: 202 MG/DL
CLARITY UR: CLEAR
CO2 SERPL-SCNC: 29 MMOL/L (ref 21–32)
COLOR UR: NORMAL
CREAT SERPL-MCNC: 0.54 MG/DL (ref 0.6–1.3)
EOSINOPHIL # BLD AUTO: 0.2 THOUSAND/ÂΜL (ref 0–0.61)
EOSINOPHIL NFR BLD AUTO: 2 % (ref 0–6)
ERYTHROCYTE [DISTWIDTH] IN BLOOD BY AUTOMATED COUNT: 13 % (ref 11.6–15.1)
EST. AVERAGE GLUCOSE BLD GHB EST-MCNC: 111 MG/DL
GFR SERPL CREATININE-BSD FRML MDRD: 93 ML/MIN/1.73SQ M
GLUCOSE P FAST SERPL-MCNC: 93 MG/DL (ref 65–99)
GLUCOSE UR STRIP-MCNC: NEGATIVE MG/DL
HBA1C MFR BLD: 5.5 %
HCT VFR BLD AUTO: 46.4 % (ref 34.8–46.1)
HDLC SERPL-MCNC: 81 MG/DL
HGB BLD-MCNC: 14.7 G/DL (ref 11.5–15.4)
HGB UR QL STRIP.AUTO: NEGATIVE
IMM GRANULOCYTES # BLD AUTO: 0.02 THOUSAND/UL (ref 0–0.2)
IMM GRANULOCYTES NFR BLD AUTO: 0 % (ref 0–2)
KETONES UR STRIP-MCNC: NEGATIVE MG/DL
LDLC SERPL CALC-MCNC: 101 MG/DL (ref 0–100)
LEUKOCYTE ESTERASE UR QL STRIP: NEGATIVE
LYMPHOCYTES # BLD AUTO: 2.55 THOUSANDS/ÂΜL (ref 0.6–4.47)
LYMPHOCYTES NFR BLD AUTO: 31 % (ref 14–44)
MCH RBC QN AUTO: 31.5 PG (ref 26.8–34.3)
MCHC RBC AUTO-ENTMCNC: 31.7 G/DL (ref 31.4–37.4)
MCV RBC AUTO: 99 FL (ref 82–98)
MONOCYTES # BLD AUTO: 0.47 THOUSAND/ÂΜL (ref 0.17–1.22)
MONOCYTES NFR BLD AUTO: 6 % (ref 4–12)
NEUTROPHILS # BLD AUTO: 4.89 THOUSANDS/ÂΜL (ref 1.85–7.62)
NEUTS SEG NFR BLD AUTO: 60 % (ref 43–75)
NITRITE UR QL STRIP: NEGATIVE
NON-SQ EPI CELLS URNS QL MICRO: NORMAL /HPF
NRBC BLD AUTO-RTO: 0 /100 WBCS
PH UR STRIP.AUTO: 6 [PH]
PLATELET # BLD AUTO: 315 THOUSANDS/UL (ref 149–390)
PMV BLD AUTO: 10 FL (ref 8.9–12.7)
POTASSIUM SERPL-SCNC: 4.4 MMOL/L (ref 3.5–5.3)
PROT SERPL-MCNC: 6.4 G/DL (ref 6.4–8.4)
PROT UR STRIP-MCNC: NEGATIVE MG/DL
RBC # BLD AUTO: 4.67 MILLION/UL (ref 3.81–5.12)
RBC #/AREA URNS AUTO: NORMAL /HPF
SODIUM SERPL-SCNC: 138 MMOL/L (ref 135–147)
SP GR UR STRIP.AUTO: 1.01 (ref 1–1.03)
TRIGL SERPL-MCNC: 101 MG/DL
UROBILINOGEN UR STRIP-ACNC: <2 MG/DL
VIT B12 SERPL-MCNC: 1923 PG/ML (ref 100–900)
WBC # BLD AUTO: 8.18 THOUSAND/UL (ref 4.31–10.16)
WBC #/AREA URNS AUTO: NORMAL /HPF

## 2023-05-06 PROBLEM — Z00.00 ENCOUNTER FOR SUBSEQUENT ANNUAL WELLNESS VISIT IN MEDICARE PATIENT: Status: RESOLVED | Noted: 2023-03-07 | Resolved: 2023-05-06

## 2023-05-18 ENCOUNTER — OFFICE VISIT (OUTPATIENT)
Dept: INTERNAL MEDICINE CLINIC | Facility: CLINIC | Age: 73
End: 2023-05-18

## 2023-05-18 VITALS
HEIGHT: 63 IN | BODY MASS INDEX: 17.79 KG/M2 | HEART RATE: 68 BPM | TEMPERATURE: 98 F | OXYGEN SATURATION: 98 % | RESPIRATION RATE: 16 BRPM | WEIGHT: 100.4 LBS | DIASTOLIC BLOOD PRESSURE: 70 MMHG | SYSTOLIC BLOOD PRESSURE: 112 MMHG

## 2023-05-18 DIAGNOSIS — N83.201 CYSTS OF BOTH OVARIES: ICD-10-CM

## 2023-05-18 DIAGNOSIS — M96.1 LUMBAR POST-LAMINECTOMY SYNDROME: ICD-10-CM

## 2023-05-18 DIAGNOSIS — N83.202 CYSTS OF BOTH OVARIES: ICD-10-CM

## 2023-05-18 DIAGNOSIS — M54.12 CERVICAL RADICULOPATHY: ICD-10-CM

## 2023-05-18 DIAGNOSIS — I10 PRIMARY HYPERTENSION: ICD-10-CM

## 2023-05-18 DIAGNOSIS — J41.0 SIMPLE CHRONIC BRONCHITIS (HCC): ICD-10-CM

## 2023-05-18 DIAGNOSIS — I77.1 SUBCLAVIAN ARTERY STENOSIS (HCC): ICD-10-CM

## 2023-05-18 DIAGNOSIS — R42 VERTIGO: Primary | ICD-10-CM

## 2023-05-18 PROBLEM — E04.1 THYROID NODULE: Status: ACTIVE | Noted: 2023-05-18

## 2023-05-18 NOTE — ASSESSMENT & PLAN NOTE
MRI reviewed finish the physical therapy still persistent pain patient is using local IBD cream and seen by pain management on Tylenol with codeine

## 2023-05-18 NOTE — ASSESSMENT & PLAN NOTE
Some difficulty breathing on exertion refusing to use any inhaler quit smoking more than 10 years ago at present time minimal symptoms

## 2023-05-18 NOTE — ASSESSMENT & PLAN NOTE
Evaluated by OB/GYN right-sided cyst complex bigger than the left recommended surgical intervention patient reluctant due to the high cardiac risk advised follow-up with the OB/GYN

## 2023-05-18 NOTE — ASSESSMENT & PLAN NOTE
To take meclizine 12 5 3 times a day as needed also referred to [facility] continue physical therapy patient was given the referral to evaluated and treated by [facility] again was given last time persistent dizziness

## 2023-05-18 NOTE — PROGRESS NOTES
Tobacco Cessation Counseling: Tobacco cessation counseling was provided  The patient is sincerely urged to quit consumption of tobacco  She is ready to quit tobacco        Dr Gabriella Bishop Office Visit Note  23     Kerrie Burgos 74 y o  female MRN: [id number]  : 1950    Assessment:     1  Vertigo  Assessment & Plan: To take meclizine 12 5 3 times a day as needed also referred to [facility] continue physical therapy patient was given the referral to evaluated and treated by [facility] again was given last time persistent dizziness    Orders:  -     Ambulatory referral to Physical Therapy; Future    2  Primary hypertension  Assessment & Plan:  Blood pressure controlled continue on low-salt diet and metoprolol      3  Subclavian artery stenosis [facility])  Assessment & Plan:  CTA of the head and neck done on 2022 in the emergency room work-up for dizziness and suspected TIA showed left subclavian artery stenosis 80% asymptomatic recommended to be seen by vascular surgery patient prefers to be seen by cardiology patient is followed by cardiology continue aspirin patient is reluctant to take statin      4  Cervical radiculopathy  Assessment & Plan:  MRI reviewed finish the physical therapy still persistent pain patient is using local IBD cream and seen by pain management on Tylenol with codeine      5  Lumbar post-laminectomy syndrome  Assessment & Plan:  Patient seen by a pain management on Tylenol with codeine      6  Simple chronic bronchitis (HCC)  Assessment & Plan:  Some difficulty breathing on exertion refusing to use any inhaler quit smoking more than 10 years ago at present time minimal symptoms      7  Cysts of both ovaries  Assessment & Plan:  Evaluated by OB/GYN right-sided cyst complex bigger than the left recommended surgical intervention patient reluctant due to the high cardiac risk advised follow-up with the OB/GYN            Discussion Summary and Plan:   Today's care plan and medications were reviewed with patient in detail and all their questions answered to their satisfaction  Chief Complaint   Patient presents with   • Follow-up     Had lab work yvonne still having vertigo  Weak dizzy tired vision affected feels like nerves being pinched in different parts of her body  Subjective:  Came in follow-up chronic medical condition listed under visit diagnosis was evaluated by OB/GYN for ovarian cyst recommended surgery patient reluctant to go due to the high cardiac risk also for now fatigue tired fatigue dizziness vertigo on meclizine as needed and intractable pain cervical spine radiating both upper extremity followed by pain management      The following portions of the patient's history were reviewed and updated as appropriate: allergies, current medications, past family history, past medical history, past social history, past surgical history and problem list     Review of Systems   Constitutional: Positive for chills and fatigue  Musculoskeletal: Positive for myalgias  Neurological: Positive for dizziness and headaches           Historical Information   Patient Active Problem List   Diagnosis   • Anxiety disorder   • Chronic back pain   • Chronic obstructive lung disease (HCC)   • Depressive disorder   • Hypertensive disorder   • Irritable bowel syndrome   • Lumbar post-laminectomy syndrome   • Lumbar radiculopathy   • Vertigo   • Cervical radiculopathy   • B12 deficiency   • Mild protein-calorie malnutrition (HCC)   • Acute superficial gastritis without hemorrhage   • COVID-19   • Subclavian artery stenosis (HCC)   • Thyroid nodule   • Cysts of both ovaries     Past Medical History:   Diagnosis Date   • Abdominal pain    • Back pain    • Chest pain    • COPD (chronic obstructive pulmonary disease) (HCC)    • Dizziness    • Fibromyalgia    • GERD (gastroesophageal reflux disease)    • Hypertension    • Inflammatory bowel disease    • Lightheadedness    • RVOT ventricular "tachycardia ([address] )    • SOB (shortness of breath)      Past Surgical History:   Procedure Laterality Date   • LUMBAR LAMINECTOMY     • SPINE SURGERY       Social History     Substance and Sexual Activity   Alcohol Use No     Social History     Substance and Sexual Activity   Drug Use No     Social History     Tobacco Use   Smoking Status Every Day   • Packs/day: 0 25   • Types: Cigarettes   Smokeless Tobacco Never     Family History   Problem Relation Age of Onset   • Heart disease Mother      Health Maintenance Due   Topic   • Hepatitis C Screening    • COVID-19 Vaccine (1)   • Pneumococcal Vaccine: 65+ Years (1 - PCV)   • Colorectal Cancer Screening    • Osteoporosis Screening    • BMI: Followup Plan       Meds/Allergies       Current Outpatient Medications:   •  Acetaminophen-Codeine (TYLENOL WITH CODEINE #4 PO), q6h, Disp: , Rfl:   •  fluticasone (FLONASE) 50 mcg/act nasal spray, 1 spray into each nostril 2 (two) times a day, Disp: , Rfl:   •  MECLIZINE HCL PO, Take 6 25 mg by mouth as needed  , Disp: , Rfl:   •  metoprolol tartrate (LOPRESSOR) 25 mg tablet, Take 25 mg by mouth every 12 (twelve) hours, Disp: , Rfl:       Objective:    Vitals:   /70   Pulse 68   Temp 98 °F (36 7 °C)   Resp 16   Ht 5' 3\" (1 6 m)   Wt 45 5 kg (100 lb 6 4 oz)   SpO2 98%   BMI 17 79 kg/m²   Body mass index is 17 79 kg/m²  Vitals:    05/18/23 1231   Weight: 45 5 kg (100 lb 6 4 oz)       Physical Exam  Vitals and nursing note reviewed  Constitutional:       General: She is not in acute distress  Appearance: She is well-developed  She is not ill-appearing, toxic-appearing or diaphoretic  HENT:      Head: Normocephalic and atraumatic  Right Ear: External ear normal       Left Ear: External ear normal       Nose: Nose normal       Mouth/Throat:      Pharynx: No oropharyngeal exudate  Eyes:      General: Lids are normal  Lids are everted, no foreign bodies appreciated  No scleral icterus          Right eye: No " discharge  Left eye: No discharge  Conjunctiva/sclera: Conjunctivae normal       Pupils: Pupils are equal, round, and reactive to light  Neck:      Thyroid: No thyromegaly  Vascular: Normal carotid pulses  No carotid bruit, hepatojugular reflux or JVD  Trachea: No tracheal tenderness or tracheal deviation  Cardiovascular:      Rate and Rhythm: Normal rate and regular rhythm  Pulses: Normal pulses  Heart sounds: Normal heart sounds  No murmur heard  No friction rub  No gallop  Pulmonary:      Effort: Pulmonary effort is normal  No respiratory distress  Breath sounds: Normal breath sounds  No stridor  No wheezing or rales  Chest:      Chest wall: No tenderness  Abdominal:      General: Bowel sounds are normal  There is no distension  Palpations: Abdomen is soft  There is no mass  Tenderness: There is no abdominal tenderness  There is no guarding or rebound  Comments: Epigastric tenderness   Musculoskeletal:         General: No tenderness or deformity  Normal range of motion  Cervical back: Normal range of motion and neck supple  No edema, erythema or rigidity  No spinous process tenderness or muscular tenderness  Normal range of motion  Lymphadenopathy:      Head:      Right side of head: No submental, submandibular, tonsillar, preauricular or posterior auricular adenopathy  Left side of head: No submental, submandibular, tonsillar, preauricular, posterior auricular or occipital adenopathy  Cervical: No cervical adenopathy  Right cervical: No superficial, deep or posterior cervical adenopathy  Left cervical: No superficial, deep or posterior cervical adenopathy  Upper Body:      Right upper body: No pectoral adenopathy  Left upper body: No pectoral adenopathy  Skin:     General: Skin is warm and dry  Coloration: Skin is not pale  Findings: No erythema or rash     Neurological:      Mental Status: She is alert and oriented to person, place, and time  Cranial Nerves: No cranial nerve deficit  Sensory: No sensory deficit  Motor: No tremor, abnormal muscle tone or seizure activity  Coordination: Coordination normal       Gait: Gait abnormal       Deep Tendon Reflexes: Reflexes are normal and symmetric  Reflexes normal    Psychiatric:         Behavior: Behavior normal          Thought Content:  Thought content normal          Judgment: Judgment normal          Lab Review   Appointment on 03/23/2023   Component Date Value Ref Range Status   • WBC 03/23/2023 8 18  4 31 - 10 16 Thousand/uL Final   • RBC 03/23/2023 4 67  3 81 - 5 12 Million/uL Final   • Hemoglobin 03/23/2023 14 7  11 5 - 15 4 g/dL Final   • Hematocrit 03/23/2023 46 4 (H)  34 8 - 46 1 % Final   • MCV 03/23/2023 99 (H)  82 - 98 fL Final   • MCH 03/23/2023 31 5  26 8 - 34 3 pg Final   • MCHC 03/23/2023 31 7  31 4 - 37 4 g/dL Final   • RDW 03/23/2023 13 0  11 6 - 15 1 % Final   • MPV 03/23/2023 10 0  8 9 - 12 7 fL Final   • Platelets 79/06/3381 315  149 - 390 Thousands/uL Final   • nRBC 03/23/2023 0  /100 WBCs Final   • Neutrophils Relative 03/23/2023 60  43 - 75 % Final   • Immat GRANS % 03/23/2023 0  0 - 2 % Final   • Lymphocytes Relative 03/23/2023 31  14 - 44 % Final   • Monocytes Relative 03/23/2023 6  4 - 12 % Final   • Eosinophils Relative 03/23/2023 2  0 - 6 % Final   • Basophils Relative 03/23/2023 1  0 - 1 % Final   • Neutrophils Absolute 03/23/2023 4 89  1 85 - 7 62 Thousands/µL Final   • Immature Grans Absolute 03/23/2023 0 02  0 00 - 0 20 Thousand/uL Final   • Lymphocytes Absolute 03/23/2023 2 55  0 60 - 4 47 Thousands/µL Final   • Monocytes Absolute 03/23/2023 0 47  0 17 - 1 22 Thousand/µL Final   • Eosinophils Absolute 03/23/2023 0 20  0 00 - 0 61 Thousand/µL Final   • Basophils Absolute 03/23/2023 0 05  0 00 - 0 10 Thousands/µL Final   • Sodium 03/23/2023 138  135 - 147 mmol/L Final   • Potassium 03/23/2023 4 4  3 5 - 5 3 mmol/L Final   • Chloride 03/23/2023 106  96 - 108 mmol/L Final   • CO2 03/23/2023 29  21 - 32 mmol/L Final   • ANION GAP 03/23/2023 3 (L)  4 - 13 mmol/L Final   • BUN 03/23/2023 12  5 - 25 mg/dL Final   • Creatinine 03/23/2023 0 54 (L)  0 60 - 1 30 mg/dL Final    Standardized to IDMS reference method   • Glucose, Fasting 03/23/2023 93  65 - 99 mg/dL Final    Specimen collection should occur prior to Sulfasalazine administration due to the potential for falsely depressed results  Specimen collection should occur prior to Sulfapyridine administration due to the potential for falsely elevated results  • Calcium 03/23/2023 9 2  8 3 - 10 1 mg/dL Final   • AST 03/23/2023 16  5 - 45 U/L Final    Specimen collection should occur prior to Sulfasalazine administration due to the potential for falsely depressed results  • ALT 03/23/2023 22  12 - 78 U/L Final    Specimen collection should occur prior to Sulfasalazine and/or Sulfapyridine administration due to the potential for falsely depressed results  • Alkaline Phosphatase 03/23/2023 58  46 - 116 U/L Final   • Total Protein 03/23/2023 6 4  6 4 - 8 4 g/dL Final   • Albumin 03/23/2023 3 8  3 5 - 5 0 g/dL Final   • Total Bilirubin 03/23/2023 0 91  0 20 - 1 00 mg/dL Final    Use of this assay is not recommended for patients undergoing treatment with eltrombopag due to the potential for falsely elevated results  • eGFR 03/23/2023 93  ml/min/1 73sq m Final   • Hemoglobin A1C 03/23/2023 5 5  Normal 3 8-5 6%; PreDiabetic 5 7-6 4%;  Diabetic >=6 5%; Glycemic control for adults with diabetes <7 0% % Final   • EAG 03/23/2023 111  mg/dl Final   • Cholesterol 03/23/2023 202 (H)  See Comment mg/dL Final    Cholesterol:         Pediatric <18 Years        Desirable          <170 mg/dL      Borderline High    170-199 mg/dL      High               >=200 mg/dL        Adult >=18 Years            Desirable         <200 mg/dL      Borderline High   200-239 mg/dL      High >239 mg/dL     • Triglycerides 03/23/2023 101  See Comment mg/dL Final    Triglyceride:     0-9Y            <75mg/dL     10Y-17Y         <90 mg/dL       >=18Y     Normal          <150 mg/dL     Borderline High 150-199 mg/dL     High            200-499 mg/dL        Very High       >499 mg/dL    Specimen collection should occur prior to N-Acetylcysteine or Metamizole administration due to the potential for falsely depressed results  • HDL, Direct 03/23/2023 81  >=50 mg/dL Final   • LDL Calculated 03/23/2023 101 (H)  0 - 100 mg/dL Final    This screening LDL is a calculated result  It does not have the accuracy of the Direct Measured LDL in the monitoring of patients with hyperlipidemia and/or statin therapy  Direct Measure LDL (CFC029) must be ordered separately in these patients    LDL Cholesterol:     Optimal           <100 mg/dl     Near Optimal      100-129 mg/dl     Above Optimal       Borderline High 130-159 mg/dl       High            160-189 mg/dl       Very High       >189 mg/dl          • Color, UA 03/23/2023 Light Yellow   Final   • Clarity, UA 03/23/2023 Clear   Final   • Specific Gravity, UA 03/23/2023 1 015  1 003 - 1 030 Final   • pH, UA 03/23/2023 6 0  4 5, 5 0, 5 5, 6 0, 6 5, 7 0, 7 5, 8 0 Final   • Leukocytes, UA 03/23/2023 Negative  Negative Final   • Nitrite, UA 03/23/2023 Negative  Negative Final   • Protein, UA 03/23/2023 Negative  Negative mg/dl Final   • Glucose, UA 03/23/2023 Negative  Negative mg/dl Final   • Ketones, UA 03/23/2023 Negative  Negative mg/dl Final   • Urobilinogen, UA 03/23/2023 <2 0  <2 0 mg/dl mg/dl Final   • Bilirubin, UA 03/23/2023 Negative  Negative Final   • Occult Blood, UA 03/23/2023 Negative  Negative Final   • RBC, UA 03/23/2023 None Seen  None Seen, 1-2 /hpf Final   • WBC, UA 03/23/2023 1-2  None Seen, 1-2 /hpf Final   • Epithelial Cells 03/23/2023 Occasional  None Seen, Occasional /hpf Final   • Bacteria, UA 03/23/2023 None Seen  None Seen, Occasional /hpf Final   • Vit D, 25-Hydroxy 03/23/2023 9 9 (L)  30 0 - 100 0 ng/mL Final   • Vitamin B-12 03/23/2023 1,923 (H)  100 - 900 pg/mL Final         Patient Instructions   Cervical Cancer   AMBULATORY CARE:   Cervical cancer  starts in the cells of the cervix  The cervix is the opening of the uterus  Common symptoms include the following:   Unusual vaginal bleeding after sex    Vaginal bleeding or discharge between your normal monthly periods    Vaginal bleeding or discharge after menopause    Pelvic pain or low back pain    Swelling in your legs from fluid buildup    Call your local emergency number (911 in the [address] [facility]) for any of the following: You suddenly feel lightheaded and short of breath  You have chest pain when you take a deep breath or cough  You cough up blood  Seek immediate care if:   Your arm or leg feels warm, tender, and painful  It may look swollen and red  You cannot urinate  Call your doctor or oncologist if:   You have a fever  You have foul-smelling vaginal discharge  You have new or heavier bleeding from your vagina  You have new or worsening pain  You have nausea or are vomiting  You have swelling in your abdomen or legs  You have to urinate urgently and often, or you cannot hold your urine  You have questions or concerns about your condition or care  Treatment for cervical cancer  may include any of the following:  Radiation therapy  is used to kill cancer cells with high-energy x-ray beams  Chemotherapy  is medicine given to kill cancer cells  Targeted therapy  is medicine given to kill cancer cells without harming healthy cells  Surgery  may be needed to remove the cervical cancer  Your ovaries, fallopian tubes, and uterus may be removed if the cancer has spread to these areas  All or part of your vagina, bladder, or end of your bowel may also be removed   Ask your healthcare provider for more information on the different types of surgeries you may need     Self-care:   Eat a variety of healthy foods  Healthy foods include fruits, vegetables, whole-grain breads, low-fat dairy products, beans, lean meats, and fish  Ask if you need to be on a special diet  Exercise as directed  Ask your healthcare provider or oncologist about the best exercise plan for you  Exercise prevents muscle loss and can help improve your energy level and appetite  Do not smoke  Smoking increases your risk for new or returning cancer  Ask your healthcare provider for information if you currently smoke and need help to quit  E-cigarettes or smokeless tobacco still contain nicotine  Talk to your healthcare provider before you use these products  Drink liquids as directed  Liquids prevent dehydration  Ask your healthcare provider how much liquid to drink each day and which liquids are best for you  Limit or do not drink alcohol as directed  Ask your healthcare provider if it is safe for you to drink alcohol  Also ask how much is safe for you to drink  Alcohol can make it hard to manage side effects of cancer treatment  Prevent cervical cancer:   Use condoms and barrier methods for all types of sexual contact  This will help prevent human papillomavirus (HPV) infection  Use a new condom or latex barrier each time you have sex  This includes oral, vaginal, and anal sex  Make sure that the condom fits and is put on correctly  Rubber latex sheets or dental dams can be used for oral sex  Ask your healthcare provider how to use these items and where to get them  If you are allergic to latex, use a nonlatex product such as polyurethane  Ask about the HPV vaccine  The vaccine can help protect against HPV infection  It is most effective if given before sexual activity begins  This allows the body to build almost complete protection against HPV before contact with the virus  The vaccine is usually given at 6or 15years of age but may be given as early as 5 years  "The vaccine can be given through age 32  Get Pap smears as directed  The Pap smear can help diagnose cervical cancer in an early stage  Cancer that is in an early stage may be easier to treat  Pap smears usually start at age 24 and continue until age 72  A Pap smear alone may be done every 3 years  An HPV test alone or with a Pap smear may be done every 5 years, starting at age 27  Your healthcare provider will tell you if you need to have Pap smears more often or after age 72  For support and more information:   [address]  [place] [address]  Phone: [phone]  Web Address: [email or web address]  [address]  Phone: [phone]  Web Address: [email or web address]  gov  Follow up with your doctor or oncologist as directed: You will need to see your oncologist for ongoing tests  Write down your questions so you remember to ask them during your visits  © Copyright Jeri Sahu 2022 Information is for End User's use only and may not be sold, redistributed or otherwise used for commercial purposes  The above information is an  only  It is not intended as medical advice for individual conditions or treatments  Talk to your doctor, nurse or pharmacist before following any medical regimen to see if it is safe and effective for you  Fanta Guzman MD        \"This note has been constructed using a voice recognition system  Therefore there may be syntax, spelling, and/or grammatical errors  Please call if you have any questions   \"  "

## 2023-05-18 NOTE — PATIENT INSTRUCTIONS
Cervical Cancer   AMBULATORY CARE:   Cervical cancer  starts in the cells of the cervix  The cervix is the opening of the uterus  Common symptoms include the following:   Unusual vaginal bleeding after sex    Vaginal bleeding or discharge between your normal monthly periods    Vaginal bleeding or discharge after menopause    Pelvic pain or low back pain    Swelling in your legs from fluid buildup    Call your local emergency number (911 in the [address] [facility]) for any of the following: You suddenly feel lightheaded and short of breath  You have chest pain when you take a deep breath or cough  You cough up blood  Seek immediate care if:   Your arm or leg feels warm, tender, and painful  It may look swollen and red  You cannot urinate  Call your doctor or oncologist if:   You have a fever  You have foul-smelling vaginal discharge  You have new or heavier bleeding from your vagina  You have new or worsening pain  You have nausea or are vomiting  You have swelling in your abdomen or legs  You have to urinate urgently and often, or you cannot hold your urine  You have questions or concerns about your condition or care  Treatment for cervical cancer  may include any of the following:  Radiation therapy  is used to kill cancer cells with high-energy x-ray beams  Chemotherapy  is medicine given to kill cancer cells  Targeted therapy  is medicine given to kill cancer cells without harming healthy cells  Surgery  may be needed to remove the cervical cancer  Your ovaries, fallopian tubes, and uterus may be removed if the cancer has spread to these areas  All or part of your vagina, bladder, or end of your bowel may also be removed  Ask your healthcare provider for more information on the different types of surgeries you may need  Self-care:   Eat a variety of healthy foods    Healthy foods include fruits, vegetables, whole-grain breads, low-fat dairy products, beans, lean meats, and fish  Ask if you need to be on a special diet  Exercise as directed  Ask your healthcare provider or oncologist about the best exercise plan for you  Exercise prevents muscle loss and can help improve your energy level and appetite  Do not smoke  Smoking increases your risk for new or returning cancer  Ask your healthcare provider for information if you currently smoke and need help to quit  E-cigarettes or smokeless tobacco still contain nicotine  Talk to your healthcare provider before you use these products  Drink liquids as directed  Liquids prevent dehydration  Ask your healthcare provider how much liquid to drink each day and which liquids are best for you  Limit or do not drink alcohol as directed  Ask your healthcare provider if it is safe for you to drink alcohol  Also ask how much is safe for you to drink  Alcohol can make it hard to manage side effects of cancer treatment  Prevent cervical cancer:   Use condoms and barrier methods for all types of sexual contact  This will help prevent human papillomavirus (HPV) infection  Use a new condom or latex barrier each time you have sex  This includes oral, vaginal, and anal sex  Make sure that the condom fits and is put on correctly  Rubber latex sheets or dental dams can be used for oral sex  Ask your healthcare provider how to use these items and where to get them  If you are allergic to latex, use a nonlatex product such as polyurethane  Ask about the HPV vaccine  The vaccine can help protect against HPV infection  It is most effective if given before sexual activity begins  This allows the body to build almost complete protection against HPV before contact with the virus  The vaccine is usually given at 6or 15years of age but may be given as early as 5 years  The vaccine can be given through age 32  Get Pap smears as directed  The Pap smear can help diagnose cervical cancer in an early stage   Cancer that is in an early stage may be easier to treat  Pap smears usually start at age 24 and continue until age 72  A Pap smear alone may be done every 3 years  An HPV test alone or with a Pap smear may be done every 5 years, starting at age 27  Your healthcare provider will tell you if you need to have Pap smears more often or after age 72  For support and more information:   [address]  [place]  [place] [address]  Phone: [phone]  Web Address: [email or web address]  [address]  Phone: [phone]  Web Address: [email or web address]  gov  Follow up with your doctor or oncologist as directed: You will need to see your oncologist for ongoing tests  Write down your questions so you remember to ask them during your visits  © Copyright Jamir Morillo 2022 Information is for End User's use only and may not be sold, redistributed or otherwise used for commercial purposes  The above information is an  only  It is not intended as medical advice for individual conditions or treatments  Talk to your doctor, nurse or pharmacist before following any medical regimen to see if it is safe and effective for you

## 2023-06-08 ENCOUNTER — OFFICE VISIT (OUTPATIENT)
Dept: INTERNAL MEDICINE CLINIC | Facility: CLINIC | Age: 73
End: 2023-06-08
Payer: COMMERCIAL

## 2023-06-08 VITALS — HEIGHT: 63 IN | HEART RATE: 63 BPM | BODY MASS INDEX: 17.43 KG/M2 | WEIGHT: 98.4 LBS | OXYGEN SATURATION: 97 %

## 2023-06-08 DIAGNOSIS — M54.50 ACUTE MIDLINE LOW BACK PAIN WITHOUT SCIATICA: ICD-10-CM

## 2023-06-08 DIAGNOSIS — N83.202 CYSTS OF BOTH OVARIES: ICD-10-CM

## 2023-06-08 DIAGNOSIS — T14.90XA INJURY: ICD-10-CM
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
## 2023-06-08 DIAGNOSIS — N83.201 CYSTS OF BOTH OVARIES: ICD-10-CM

## 2023-06-08 DIAGNOSIS — M96.1 LUMBAR POST-LAMINECTOMY SYNDROME: ICD-10-CM

## 2023-06-08 PROCEDURE — 99213 OFFICE O/P EST LOW 20 MIN: CPT | Performed by: INTERNAL MEDICINE

## 2023-06-08 NOTE — ASSESSMENT & PLAN NOTE
Evaluated by OB/GYN recommended surgical intervention especially for right sided seems to be complex was seen by cardiology and cardiology advised due to the high risk state postpone for now the only option take Tylenol with codeine for the pain as needed

## 2023-06-08 NOTE — ASSESSMENT & PLAN NOTE
Patient fell backwards as described under the fall awaiting x-ray lumbosacral spine until then take Tylenol with codeine prescribed by pain management and to use donut pillow and ice

## 2023-06-08 NOTE — ASSESSMENT & PLAN NOTE
Followed by pain management managed with the Tylenol with codeine helping the patient to carry out day-to-day activities and function

## 2023-06-08 NOTE — ASSESSMENT & PLAN NOTE
Patient was trying to clean above her head standing on a support fell backwards on the lumbosacral area since then complains of pain lumbosacral area nonradiating has not seek any treatment denies any chest pain palpitations dizziness or any other associate symptoms simply tripped and fell so patient is followed by pain management and being treated with Tylenol with codeine advised to use that as needed for pain while awaiting x-ray lumbosacral spine

## 2023-06-08 NOTE — PROGRESS NOTES
Dr Anita Mccauley Office Visit Note  23     Orklaus Bonilla 74 y o  female MRN: [id number]  : 1950    Assessment:     1  Fall, initial encounter  Assessment & Plan:  Patient was trying to clean above her head standing on a support fell backwards on the lumbosacral area since then complains of pain lumbosacral area nonradiating has not seek any treatment denies any chest pain palpitations dizziness or any other associate symptoms simply tripped and fell so patient is followed by pain management and being treated with Tylenol with codeine advised to use that as needed for pain while awaiting x-ray lumbosacral spine    Orders:  -     XR spine lumbar 2 or 3 views injury; Future; Expected date: 2023  -     XR sacrum and coccyx; Future; Expected date: 2023    2  Acute midline low back pain without sciatica  Assessment & Plan:  Patient fell backwards as described under the fall awaiting x-ray lumbosacral spine until then take Tylenol with codeine prescribed by pain management and to use donut pillow and ice    Orders:  -     XR spine lumbar 2 or 3 views injury; Future; Expected date: 2023  -     XR sacrum and coccyx; Future; Expected date: 2023    3  Injury  -     XR spine lumbar 2 or 3 views injury; Future; Expected date: 2023  -     XR sacrum and coccyx; Future; Expected date: 2023    4  Lumbar post-laminectomy syndrome  Assessment & Plan:  Followed by pain management managed with the Tylenol with codeine helping the patient to carry out day-to-day activities and function      5  Cysts of both ovaries  Assessment & Plan:  Evaluated by OB/GYN recommended surgical intervention especially for right sided seems to be complex was seen by cardiology and cardiology advised due to the high risk state postpone for now the only option take Tylenol with codeine for the pain as needed            Discussion Summary and Plan:   Today's care plan and medications were reviewed with patient in detail and all their questions answered to their satisfaction  No chief complaint on file  Subjective:  Patient fell 4 days ago at home while he was cleaning above her head and standing on support fell backwards on the lumbosacral area since then complaining of pain lumbosacral area nonradiating some difficulty walking denied any chest pain no difficulty breathing no palpitations no dizziness and no vertigo evaluated patient did not seek any treatment in the emergency room seen first time in the office after the fall      The following portions of the patient's history were reviewed and updated as appropriate: allergies, current medications, past family history, past medical history, past social history, past surgical history and problem list     Review of Systems   Constitutional: Positive for activity change  Negative for appetite change, chills, diaphoresis, fatigue, fever and unexpected weight change  HENT: Negative for congestion, dental problem, drooling, ear discharge, ear pain, facial swelling, hearing loss, mouth sores, nosebleeds, postnasal drip, rhinorrhea, sinus pressure, sneezing, sore throat, tinnitus, trouble swallowing and voice change  Eyes: Negative for photophobia, pain, discharge, redness, itching and visual disturbance  Respiratory: Negative for apnea, cough, choking, chest tightness, shortness of breath, wheezing and stridor  Cardiovascular: Negative for chest pain, palpitations and leg swelling  Gastrointestinal: Positive for abdominal pain  Negative for abdominal distention, anal bleeding, blood in stool, constipation, diarrhea, nausea, rectal pain and vomiting  Endocrine: Negative for cold intolerance, heat intolerance, polydipsia, polyphagia and polyuria  Genitourinary: Negative for decreased urine volume, difficulty urinating, dysuria, enuresis, flank pain, frequency, genital sores, hematuria and urgency     Musculoskeletal: Positive for back pain, gait problem and myalgias  Negative for arthralgias, joint swelling, neck pain and neck stiffness  Skin: Negative for color change, pallor, rash and wound  Allergic/Immunologic: Negative  Negative for environmental allergies, food allergies and immunocompromised state  Neurological: Negative for dizziness, tremors, seizures, syncope, facial asymmetry, speech difficulty, weakness, light-headedness, numbness and headaches  Psychiatric/Behavioral: Negative for agitation, behavioral problems, confusion, decreased concentration, dysphoric mood, hallucinations, self-injury, sleep disturbance and suicidal ideas  The patient is not nervous/anxious and is not hyperactive            Historical Information   Patient Active Problem List   Diagnosis   • Anxiety disorder   • Chronic back pain   • Chronic obstructive lung disease (HCC)   • Depressive disorder   • Hypertensive disorder   • Irritable bowel syndrome   • Lumbar post-laminectomy syndrome   • Lumbar radiculopathy   • Vertigo   • Cervical radiculopathy   • B12 deficiency   • Mild protein-calorie malnutrition ([facility])   • Acute superficial gastritis without hemorrhage   • COVID-19   • Subclavian artery stenosis ([facility])   • Thyroid nodule   • Cysts of both ovaries   • Fall   • Acute midline low back pain without sciatica     Past Medical History:   Diagnosis Date   • Abdominal pain    • Back pain    • Chest pain    • COPD (chronic obstructive pulmonary disease) ([facility])    • Dizziness    • Fibromyalgia    • GERD (gastroesophageal reflux disease)    • Hypertension    • Inflammatory bowel disease    • Lightheadedness    • RVOT ventricular tachycardia ([facility])    • SOB (shortness of breath)      Past Surgical History:   Procedure Laterality Date   • LUMBAR LAMINECTOMY     • SPINE SURGERY       Social History     Substance and Sexual Activity   Alcohol Use No     Social History     Substance and Sexual Activity   Drug Use No     Social History     Tobacco Use   Smoking Status Every Day   • "Packs/day: 0 25   • Types: Cigarettes   Smokeless Tobacco Never     Family History   Problem Relation Age of Onset   • Heart disease Mother      Health Maintenance Due   Topic   • Hepatitis C Screening    • COVID-19 Vaccine (1)   • Pneumococcal Vaccine: 65+ Years (1 - PCV)   • Colorectal Cancer Screening    • Osteoporosis Screening    • Influenza Vaccine (Season Ended)   • BMI: Followup Plan       Meds/Allergies       Current Outpatient Medications:   •  Acetaminophen-Codeine (TYLENOL WITH CODEINE #4 PO), q6h, Disp: , Rfl:   •  fluticasone (FLONASE) 50 mcg/act nasal spray, 1 spray into each nostril 2 (two) times a day, Disp: , Rfl:   •  MECLIZINE HCL PO, Take 6 25 mg by mouth as needed  , Disp: , Rfl:   •  metoprolol tartrate (LOPRESSOR) 25 mg tablet, Take 25 mg by mouth every 12 (twelve) hours, Disp: , Rfl:       Objective:    Vitals:   Pulse 63   Ht 5' 3\" (1 6 m)   Wt 44 6 kg (98 lb 6 4 oz)   SpO2 97%   BMI 17 43 kg/m²   Body mass index is 17 43 kg/m²  Vitals:    06/08/23 1327   Weight: 44 6 kg (98 lb 6 4 oz)       Physical Exam  Vitals and nursing note reviewed  Constitutional:       General: She is not in acute distress  Appearance: She is well-developed  She is not ill-appearing, toxic-appearing or diaphoretic  Comments: Malnourished   HENT:      Head: Normocephalic and atraumatic  Right Ear: External ear normal       Left Ear: External ear normal       Nose: Nose normal       Mouth/Throat:      Pharynx: No oropharyngeal exudate  Eyes:      General: Lids are normal  Lids are everted, no foreign bodies appreciated  No scleral icterus  Right eye: No discharge  Left eye: No discharge  Conjunctiva/sclera: Conjunctivae normal       Pupils: Pupils are equal, round, and reactive to light  Neck:      Thyroid: No thyromegaly  Vascular: Normal carotid pulses  No carotid bruit, hepatojugular reflux or JVD  Trachea: No tracheal tenderness or tracheal deviation   " Cardiovascular:      Rate and Rhythm: Normal rate and regular rhythm  Pulses: Normal pulses  Heart sounds: Normal heart sounds  No murmur heard  No friction rub  No gallop  Pulmonary:      Effort: Pulmonary effort is normal  No respiratory distress  Breath sounds: Normal breath sounds  No stridor  No wheezing or rales  Chest:      Chest wall: No tenderness  Abdominal:      General: Bowel sounds are normal  There is no distension  Palpations: Abdomen is soft  There is no mass  Tenderness: There is no abdominal tenderness  There is no guarding or rebound  Musculoskeletal:         General: No tenderness or deformity  Normal range of motion  Cervical back: Normal range of motion and neck supple  No edema, erythema or rigidity  No spinous process tenderness or muscular tenderness  Normal range of motion  Comments: Paraspinal muscle spasm of cervical spine with tenderness movement restricted tenderness sacral area   Lymphadenopathy:      Head:      Right side of head: No submental, submandibular, tonsillar, preauricular or posterior auricular adenopathy  Left side of head: No submental, submandibular, tonsillar, preauricular, posterior auricular or occipital adenopathy  Cervical: No cervical adenopathy  Right cervical: No superficial, deep or posterior cervical adenopathy  Left cervical: No superficial, deep or posterior cervical adenopathy  Upper Body:      Right upper body: No pectoral adenopathy  Left upper body: No pectoral adenopathy  Skin:     General: Skin is warm and dry  Coloration: Skin is not pale  Findings: No erythema or rash  Neurological:      General: No focal deficit present  Mental Status: She is alert and oriented to person, place, and time  Cranial Nerves: No cranial nerve deficit  Sensory: No sensory deficit  Motor: No tremor, abnormal muscle tone or seizure activity        Coordination: Coordination normal       Gait: Gait normal       Deep Tendon Reflexes: Reflexes are normal and symmetric  Reflexes normal    Psychiatric:         Behavior: Behavior normal          Thought Content: Thought content normal          Judgment: Judgment normal          Lab Review   No visits with results within 2 Month(s) from this visit     Latest known visit with results is:   Appointment on 03/23/2023   Component Date Value Ref Range Status   • WBC 03/23/2023 8 18  4 31 - 10 16 Thousand/uL Final   • RBC 03/23/2023 4 67  3 81 - 5 12 Million/uL Final   • Hemoglobin 03/23/2023 14 7  11 5 - 15 4 g/dL Final   • Hematocrit 03/23/2023 46 4 (H)  34 8 - 46 1 % Final   • MCV 03/23/2023 99 (H)  82 - 98 fL Final   • MCH 03/23/2023 31 5  26 8 - 34 3 pg Final   • MCHC 03/23/2023 31 7  31 4 - 37 4 g/dL Final   • RDW 03/23/2023 13 0  11 6 - 15 1 % Final   • MPV 03/23/2023 10 0  8 9 - 12 7 fL Final   • Platelets 16/32/2612 315  149 - 390 Thousands/uL Final   • nRBC 03/23/2023 0  /100 WBCs Final   • Neutrophils Relative 03/23/2023 60  43 - 75 % Final   • Immat GRANS % 03/23/2023 0  0 - 2 % Final   • Lymphocytes Relative 03/23/2023 31  14 - 44 % Final   • Monocytes Relative 03/23/2023 6  4 - 12 % Final   • Eosinophils Relative 03/23/2023 2  0 - 6 % Final   • Basophils Relative 03/23/2023 1  0 - 1 % Final   • Neutrophils Absolute 03/23/2023 4 89  1 85 - 7 62 Thousands/µL Final   • Immature Grans Absolute 03/23/2023 0 02  0 00 - 0 20 Thousand/uL Final   • Lymphocytes Absolute 03/23/2023 2 55  0 60 - 4 47 Thousands/µL Final   • Monocytes Absolute 03/23/2023 0 47  0 17 - 1 22 Thousand/µL Final   • Eosinophils Absolute 03/23/2023 0 20  0 00 - 0 61 Thousand/µL Final   • Basophils Absolute 03/23/2023 0 05  0 00 - 0 10 Thousands/µL Final   • Sodium 03/23/2023 138  135 - 147 mmol/L Final   • Potassium 03/23/2023 4 4  3 5 - 5 3 mmol/L Final   • Chloride 03/23/2023 106  96 - 108 mmol/L Final   • CO2 03/23/2023 29  21 - 32 mmol/L Final   • ANION GAP 03/23/2023 3 (L)  4 - 13 mmol/L Final   • BUN 03/23/2023 12  5 - 25 mg/dL Final   • Creatinine 03/23/2023 0 54 (L)  0 60 - 1 30 mg/dL Final    Standardized to IDMS reference method   • Glucose, Fasting 03/23/2023 93  65 - 99 mg/dL Final    Specimen collection should occur prior to Sulfasalazine administration due to the potential for falsely depressed results  Specimen collection should occur prior to Sulfapyridine administration due to the potential for falsely elevated results  • Calcium 03/23/2023 9 2  8 3 - 10 1 mg/dL Final   • AST 03/23/2023 16  5 - 45 U/L Final    Specimen collection should occur prior to Sulfasalazine administration due to the potential for falsely depressed results  • ALT 03/23/2023 22  12 - 78 U/L Final    Specimen collection should occur prior to Sulfasalazine and/or Sulfapyridine administration due to the potential for falsely depressed results  • Alkaline Phosphatase 03/23/2023 58  46 - 116 U/L Final   • Total Protein 03/23/2023 6 4  6 4 - 8 4 g/dL Final   • Albumin 03/23/2023 3 8  3 5 - 5 0 g/dL Final   • Total Bilirubin 03/23/2023 0 91  0 20 - 1 00 mg/dL Final    Use of this assay is not recommended for patients undergoing treatment with eltrombopag due to the potential for falsely elevated results  • eGFR 03/23/2023 93  ml/min/1 73sq m Final   • Hemoglobin A1C 03/23/2023 5 5  Normal 3 8-5 6%; PreDiabetic 5 7-6 4%;  Diabetic >=6 5%; Glycemic control for adults with diabetes <7 0% % Final   • EAG 03/23/2023 111  mg/dl Final   • Cholesterol 03/23/2023 202 (H)  See Comment mg/dL Final    Cholesterol:         Pediatric <18 Years        Desirable          <170 mg/dL      Borderline High    170-199 mg/dL      High               >=200 mg/dL        Adult >=18 Years            Desirable         <200 mg/dL      Borderline High   200-239 mg/dL      High              >239 mg/dL     • Triglycerides 03/23/2023 101  See Comment mg/dL Final    Triglyceride:     0-9Y            <75mg/dL 10Y-17Y         <90 mg/dL       >=18Y     Normal          <150 mg/dL     Borderline High 150-199 mg/dL     High            200-499 mg/dL        Very High       >499 mg/dL    Specimen collection should occur prior to N-Acetylcysteine or Metamizole administration due to the potential for falsely depressed results  • HDL, Direct 03/23/2023 81  >=50 mg/dL Final   • LDL Calculated 03/23/2023 101 (H)  0 - 100 mg/dL Final    This screening LDL is a calculated result  It does not have the accuracy of the Direct Measured LDL in the monitoring of patients with hyperlipidemia and/or statin therapy  Direct Measure LDL (VWR338) must be ordered separately in these patients    LDL Cholesterol:     Optimal           <100 mg/dl     Near Optimal      100-129 mg/dl     Above Optimal       Borderline High 130-159 mg/dl       High            160-189 mg/dl       Very High       >189 mg/dl          • Color, UA 03/23/2023 Light Yellow   Final   • Clarity, UA 03/23/2023 Clear   Final   • Specific Gravity, UA 03/23/2023 1 015  1 003 - 1 030 Final   • pH, UA 03/23/2023 6 0  4 5, 5 0, 5 5, 6 0, 6 5, 7 0, 7 5, 8 0 Final   • Leukocytes, UA 03/23/2023 Negative  Negative Final   • Nitrite, UA 03/23/2023 Negative  Negative Final   • Protein, UA 03/23/2023 Negative  Negative mg/dl Final   • Glucose, UA 03/23/2023 Negative  Negative mg/dl Final   • Ketones, UA 03/23/2023 Negative  Negative mg/dl Final   • Urobilinogen, UA 03/23/2023 <2 0  <2 0 mg/dl mg/dl Final   • Bilirubin, UA 03/23/2023 Negative  Negative Final   • Occult Blood, UA 03/23/2023 Negative  Negative Final   • RBC, UA 03/23/2023 None Seen  None Seen, 1-2 /hpf Final   • WBC, UA 03/23/2023 1-2  None Seen, 1-2 /hpf Final   • Epithelial Cells 03/23/2023 Occasional  None Seen, Occasional /hpf Final   • Bacteria, UA 03/23/2023 None Seen  None Seen, Occasional /hpf Final   • Vit D, 25-Hydroxy 03/23/2023 9 9 (L)  30 0 - 100 0 ng/mL Final   • Vitamin B-12 03/23/2023 1,923 (H)  100 - 900 pg/mL Final         Patient Instructions   Back Pain   WHAT YOU NEED TO KNOW:   What do I need to know about back pain? Back pain is common  You may have back pain and muscle spasms  You may feel sore or stiff on one or both sides of your back  The pain may spread to your lower body  What increases my risk for back pain? A condition that affects your spine, joints, or muscles, such as muscle tension or disc problems    Repeated bending, lifting, or twisting, or lifting heavy items    Injury from a fall or accident    Lack of regular physical activity     Obesity or pregnancy     Smoking    Aging    Driving, sitting, or standing for long periods    Bad posture while sitting or standing    How is back pain diagnosed? Your healthcare provider will ask if you have any medical conditions  He or she may ask if you have a history of back pain and how it started  He or she may watch you stand and walk, and check your range of motion  Show him or her where you feel pain and what makes it better or worse  Describe the pain, how bad it is, and how long it lasts  Tell your provider if your pain worsens at night or when you lie on your back  How is back pain treated? Medicines:      NSAIDs  help decrease swelling and pain or fever  This medicine is available with or without a doctor's order  NSAIDs can cause stomach bleeding or kidney problems in certain people  If you take blood thinner medicine, always ask your healthcare provider if NSAIDs are safe for you  Always read the medicine label and follow directions  Acetaminophen  decreases pain and fever  It is available without a doctor's order  Ask how much to take and how often to take it  Follow directions  Read the labels of all other medicines you are using to see if they also contain acetaminophen, or ask your doctor or pharmacist  Acetaminophen can cause liver damage if not taken correctly      Muscle relaxers  help decrease muscle spasms and back pain     Acupressure  may be recommended to decrease pain and improve movement  Acupressure is pressure or localized massage to the area of your back pain  A transcutaneous electrical nerve stimulation (TENS) unit  is a portable, pocket-sized, battery-powered device that attaches to your skin  It is usually placed over the area of pain  It uses mild, safe electrical signals to help control pain  How do I manage back pain? Apply ice  on your back for 15 to 20 minutes every hour or as directed  Use an ice pack, or put crushed ice in a plastic bag  Cover it with a towel before you apply it to your skin  Ice helps prevent tissue damage and decreases pain  Apply heat  on your back for 20 to 30 minutes every 2 hours for as many days as directed  Heat helps decrease pain and muscle spasms  Stay active  as much as you can without causing more pain  Bed rest could make your back pain worse  Avoid heavy lifting until your pain is gone  Go to physical therapy  as directed  A physical therapist can teach you exercises to help improve movement and strength, and to decrease pain  Call your local emergency number (911 in the [address] [facility]) if:   You have severe back pain with chest pain  You cannot control your urine or bowel movements  Your pain becomes so severe that you cannot walk  When should I seek immediate care? You have pain, numbness, or weakness in one or both legs  You have severe back pain, nausea, and vomiting  You have severe back pain that spreads to your side or genital area  When should I call my doctor? You have back pain that does not get better with rest and pain medicine  You have a fever  You have pain that worsens when you are on your back or when you rest     You have pain that worsens when you cough or sneeze  You lose weight without trying  You have questions or concerns about your condition or care      CARE AGREEMENT:   You have the right to help plan your "care  Learn about your health condition and how it may be treated  Discuss treatment options with your healthcare providers to decide what care you want to receive  You always have the right to refuse treatment  The above information is an  only  It is not intended as medical advice for individual conditions or treatments  Talk to your doctor, nurse or pharmacist before following any medical regimen to see if it is safe and effective for you  © Copyright [employer] 2022 Information is for End User's use only and may not be sold, redistributed or otherwise used for commercial purposes  Michael Sanderson MD        \"This note has been constructed using a voice recognition system  Therefore there may be syntax, spelling, and/or grammatical errors  Please call if you have any questions   \"  "

## 2023-06-08 NOTE — PATIENT INSTRUCTIONS
Back Pain   WHAT YOU NEED TO KNOW:   What do I need to know about back pain? Back pain is common  You may have back pain and muscle spasms  You may feel sore or stiff on one or both sides of your back  The pain may spread to your lower body  What increases my risk for back pain? A condition that affects your spine, joints, or muscles, such as muscle tension or disc problems    Repeated bending, lifting, or twisting, or lifting heavy items    Injury from a fall or accident    Lack of regular physical activity     Obesity or pregnancy     Smoking    Aging    Driving, sitting, or standing for long periods    Bad posture while sitting or standing    How is back pain diagnosed? Your healthcare provider will ask if you have any medical conditions  He or she may ask if you have a history of back pain and how it started  He or she may watch you stand and walk, and check your range of motion  Show him or her where you feel pain and what makes it better or worse  Describe the pain, how bad it is, and how long it lasts  Tell your provider if your pain worsens at night or when you lie on your back  How is back pain treated? Medicines:      NSAIDs  help decrease swelling and pain or fever  This medicine is available with or without a doctor's order  NSAIDs can cause stomach bleeding or kidney problems in certain people  If you take blood thinner medicine, always ask your healthcare provider if NSAIDs are safe for you  Always read the medicine label and follow directions  Acetaminophen  decreases pain and fever  It is available without a doctor's order  Ask how much to take and how often to take it  Follow directions  Read the labels of all other medicines you are using to see if they also contain acetaminophen, or ask your doctor or pharmacist  Acetaminophen can cause liver damage if not taken correctly  Muscle relaxers  help decrease muscle spasms and back pain      Acupressure  may be recommended to decrease pain and improve movement  Acupressure is pressure or localized massage to the area of your back pain  A transcutaneous electrical nerve stimulation (TENS) unit  is a portable, pocket-sized, battery-powered device that attaches to your skin  It is usually placed over the area of pain  It uses mild, safe electrical signals to help control pain  How do I manage back pain? Apply ice  on your back for 15 to 20 minutes every hour or as directed  Use an ice pack, or put crushed ice in a plastic bag  Cover it with a towel before you apply it to your skin  Ice helps prevent tissue damage and decreases pain  Apply heat  on your back for 20 to 30 minutes every 2 hours for as many days as directed  Heat helps decrease pain and muscle spasms  Stay active  as much as you can without causing more pain  Bed rest could make your back pain worse  Avoid heavy lifting until your pain is gone  Go to physical therapy  as directed  A physical therapist can teach you exercises to help improve movement and strength, and to decrease pain  Call your local emergency number (911 in the [address] [facility]) if:   You have severe back pain with chest pain  You cannot control your urine or bowel movements  Your pain becomes so severe that you cannot walk  When should I seek immediate care? You have pain, numbness, or weakness in one or both legs  You have severe back pain, nausea, and vomiting  You have severe back pain that spreads to your side or genital area  When should I call my doctor? You have back pain that does not get better with rest and pain medicine  You have a fever  You have pain that worsens when you are on your back or when you rest     You have pain that worsens when you cough or sneeze  You lose weight without trying  You have questions or concerns about your condition or care  CARE AGREEMENT:   You have the right to help plan your care   Learn about your health condition and how it may be treated  Discuss treatment options with your healthcare providers to decide what care you want to receive  You always have the right to refuse treatment  The above information is an  only  It is not intended as medical advice for individual conditions or treatments  Talk to your doctor, nurse or pharmacist before following any medical regimen to see if it is safe and effective for you  © Copyright [employer] 2022 Information is for End User's use only and may not be sold, redistributed or otherwise used for commercial purposes

## 2023-07-31 ENCOUNTER — OFFICE VISIT (OUTPATIENT)
Dept: INTERNAL MEDICINE CLINIC | Facility: CLINIC | Age: 73
End: 2023-07-31
Payer: COMMERCIAL

## 2023-07-31 VITALS
BODY MASS INDEX: 17.5 KG/M2 | HEART RATE: 60 BPM | SYSTOLIC BLOOD PRESSURE: 108 MMHG | DIASTOLIC BLOOD PRESSURE: 70 MMHG | WEIGHT: 98.8 LBS | RESPIRATION RATE: 14 BRPM | TEMPERATURE: 98 F | OXYGEN SATURATION: 100 % | HEIGHT: 63 IN

## 2023-07-31 DIAGNOSIS — I10 PRIMARY HYPERTENSION: ICD-10-CM

## 2023-07-31 DIAGNOSIS — I77.1 SUBCLAVIAN ARTERY STENOSIS (HCC): ICD-10-CM

## 2023-07-31 DIAGNOSIS — J41.0 SIMPLE CHRONIC BRONCHITIS (HCC): ICD-10-CM

## 2023-07-31 DIAGNOSIS — Z12.11 SCREENING FOR COLON CANCER: ICD-10-CM

## 2023-07-31 DIAGNOSIS — M54.12 CERVICAL RADICULOPATHY: ICD-10-CM

## 2023-07-31 DIAGNOSIS — F11.20 CONTINUOUS OPIOID DEPENDENCE (HCC): Primary | ICD-10-CM

## 2023-07-31 PROCEDURE — 99213 OFFICE O/P EST LOW 20 MIN: CPT | Performed by: INTERNAL MEDICINE

## 2023-07-31 NOTE — ASSESSMENT & PLAN NOTE
CTA of the head and neck done on August 7, 2022 in the emergency room work-up for dizziness and suspected TIA showed left subclavian artery stenosis 80% asymptomatic recommended to be seen by vascular surgery patient prefers to be seen by cardiology patient is followed by cardiology continue aspirin patient is reluctant to take statin no change stable no recurrence

## 2023-07-31 NOTE — ASSESSMENT & PLAN NOTE
Persistent pain radiating both shoulder occipital area upper extremity with some numbness using local IBD cream patient seen by pain management on Tylenol No. 4 but patient not using quite often awaiting to be seen by pain management recommended a course short course of prednisone patient refused

## 2023-07-31 NOTE — ASSESSMENT & PLAN NOTE
Intermittent difficulty breathing patient refused to use an inhaler quit smoking more than 10 years ago

## 2023-07-31 NOTE — PROGRESS NOTES
Tobacco Cessation Counseling: Tobacco cessation counseling was provided. The patient is sincerely urged to quit consumption of tobacco. She is ready to quit tobacco.       Dr. Harry Kathleen Office Visit Note  23     Amanda Villeda 74 y.o. female MRN: [id number]  : 1950    Assessment:     1. Continuous opioid dependence ([address])  Assessment & Plan:  Patient on Tylenol No. 4 every 6 hours as needed for intractable pain lumbar radiculopathy cervical spine radiculopathy prescribed by pain management patient using only half a pill 2 or 3 times a day and no evidence of any overuse abuse or addiction      2. Screening for colon cancer  -     Cologuard    3. Simple chronic bronchitis (HCC)  Assessment & Plan:  Intermittent difficulty breathing patient refused to use an inhaler quit smoking more than 10 years ago      4. Subclavian artery stenosis [facility])  Assessment & Plan:  CTA of the head and neck done on 2022 in the emergency room work-up for dizziness and suspected TIA showed left subclavian artery stenosis 80% asymptomatic recommended to be seen by vascular surgery patient prefers to be seen by cardiology patient is followed by cardiology continue aspirin patient is reluctant to take statin no change stable no recurrence      5. Primary hypertension  Assessment & Plan:  Continue low-salt diet blood pressure controlled on metoprolol followed by cardiology      6. Cervical radiculopathy  Assessment & Plan:  Persistent pain radiating both shoulder occipital area upper extremity with some numbness using local IBD cream patient seen by pain management on Tylenol No. 4 but patient not using quite often awaiting to be seen by pain management recommended a course short course of prednisone patient refused            Discussion Summary and Plan: Today's care plan and medications were reviewed with patient in detail and all their questions answered to their satisfaction.     Chief Complaint   Patient presents with   • Pain     Pain in the back of head into the shoulder blades, upper back. Consent pain. Not a sharp pain like a deep ache constantly. Affecting her vertigo. Subjective:  Came in follow-up chronic medical condition especially intractable pain cervical spine radiating both shoulders occipital area and some numbness complete work-up done in the past by pain management including MRI multiple spinal with osteoarthritis and nerve impingement syndrome also followed by cardiology for hypertension and subclavian artery stenosis patient not using Tylenol No. 4 as needed for pain as prescribed by pain management      The following portions of the patient's history were reviewed and updated as appropriate: allergies, current medications, past family history, past medical history, past social history, past surgical history and problem list.    Review of Systems   Constitutional: Positive for fatigue. Negative for activity change, appetite change, chills, diaphoresis, fever and unexpected weight change. HENT: Negative for congestion, dental problem, drooling, ear discharge, ear pain, facial swelling, hearing loss, mouth sores, nosebleeds, postnasal drip, rhinorrhea, sinus pressure, sneezing, sore throat, tinnitus, trouble swallowing and voice change. Eyes: Negative for photophobia, pain, discharge, redness, itching and visual disturbance. Respiratory: Negative for apnea, cough, choking, chest tightness, shortness of breath, wheezing and stridor. Cardiovascular: Negative for chest pain, palpitations and leg swelling. Gastrointestinal: Negative for abdominal distention, abdominal pain, anal bleeding, blood in stool, constipation, diarrhea, nausea, rectal pain and vomiting. Endocrine: Negative for cold intolerance, heat intolerance, polydipsia, polyphagia and polyuria.    Genitourinary: Negative for decreased urine volume, difficulty urinating, dysuria, enuresis, flank pain, frequency, genital sores, hematuria and urgency. Musculoskeletal: Positive for arthralgias, back pain, gait problem, neck pain and neck stiffness. Negative for joint swelling and myalgias. Skin: Negative for color change, pallor, rash and wound. Allergic/Immunologic: Negative. Negative for environmental allergies, food allergies and immunocompromised state. Neurological: Negative for dizziness, tremors, seizures, syncope, facial asymmetry, speech difficulty, weakness, light-headedness, numbness and headaches. Psychiatric/Behavioral: Negative for agitation, behavioral problems, confusion, decreased concentration, dysphoric mood, hallucinations, self-injury, sleep disturbance and suicidal ideas. The patient is not nervous/anxious and is not hyperactive.           Historical Information   Patient Active Problem List   Diagnosis   • Anxiety disorder   • Chronic back pain   • Chronic obstructive lung disease (HCC)   • Depressive disorder   • Hypertensive disorder   • Irritable bowel syndrome   • Lumbar post-laminectomy syndrome   • Lumbar radiculopathy   • Vertigo   • Cervical radiculopathy   • Continuous opioid dependence (HCC)   • B12 deficiency   • Mild protein-calorie malnutrition (HCC)   • Acute superficial gastritis without hemorrhage   • COVID-19   • Subclavian artery stenosis (HCC)   • Thyroid nodule   • Cysts of both ovaries   • Fall   • Acute midline low back pain without sciatica     Past Medical History:   Diagnosis Date   • Abdominal pain    • Back pain    • Chest pain    • COPD (chronic obstructive pulmonary disease) (HCC)    • Dizziness    • Fibromyalgia    • GERD (gastroesophageal reflux disease)    • Hypertension    • Inflammatory bowel disease    • Lightheadedness    • RVOT ventricular tachycardia (HCC)    • SOB (shortness of breath)      Past Surgical History:   Procedure Laterality Date   • LUMBAR LAMINECTOMY     • SPINE SURGERY       Social History     Substance and Sexual Activity   Alcohol Use No     Social History     Substance and Sexual Activity   Drug Use No     Social History     Tobacco Use   Smoking Status Every Day   • Packs/day: 0.25   • Types: Cigarettes   Smokeless Tobacco Never     Family History   Problem Relation Age of Onset   • Heart disease Mother      Health Maintenance Due   Topic   • Hepatitis C Screening    • COVID-19 Vaccine (1)   • Pneumococcal Vaccine: 65+ Years (1 - PCV)   • Colorectal Cancer Screening    • Osteoporosis Screening    • Influenza Vaccine (1)   • BMI: Followup Plan       Meds/Allergies       Current Outpatient Medications:   •  Acetaminophen-Codeine (TYLENOL WITH CODEINE #4 PO), q6h, Disp: , Rfl:   •  fluticasone (FLONASE) 50 mcg/act nasal spray, 1 spray into each nostril 2 (two) times a day, Disp: , Rfl:   •  MECLIZINE HCL PO, Take 6.25 mg by mouth as needed. , Disp: , Rfl:   •  metoprolol tartrate (LOPRESSOR) 25 mg tablet, Take 25 mg by mouth every 12 (twelve) hours, Disp: , Rfl:       Objective:    Vitals:   /70   Pulse 60   Temp 98 °F (36.7 °C)   Resp 14   Ht 5' 3" (1.6 m)   Wt 44.8 kg (98 lb 12.8 oz)   SpO2 100%   BMI 17.50 kg/m²   Body mass index is 17.5 kg/m². Vitals:    07/31/23 1140   Weight: 44.8 kg (98 lb 12.8 oz)       Physical Exam  Vitals and nursing note reviewed. Constitutional:       General: She is not in acute distress. Appearance: She is well-developed. She is not ill-appearing, toxic-appearing or diaphoretic. Comments: Malnourished   HENT:      Head: Normocephalic and atraumatic. Right Ear: External ear normal.      Left Ear: External ear normal.      Nose: Nose normal.      Mouth/Throat:      Pharynx: No oropharyngeal exudate. Eyes:      General: Lids are normal. Lids are everted, no foreign bodies appreciated. No scleral icterus. Right eye: No discharge. Left eye: No discharge. Conjunctiva/sclera: Conjunctivae normal.      Pupils: Pupils are equal, round, and reactive to light.    Neck:      Thyroid: No thyromegaly. Vascular: Normal carotid pulses. No carotid bruit, hepatojugular reflux or JVD. Trachea: No tracheal tenderness or tracheal deviation. Cardiovascular:      Rate and Rhythm: Normal rate and regular rhythm. Pulses: Normal pulses. Heart sounds: Normal heart sounds. No murmur heard. No friction rub. No gallop. Pulmonary:      Effort: Pulmonary effort is normal. No respiratory distress. Breath sounds: Normal breath sounds. No stridor. No wheezing or rales. Chest:      Chest wall: No tenderness. Abdominal:      General: Bowel sounds are normal. There is no distension. Palpations: Abdomen is soft. There is no mass. Tenderness: There is no abdominal tenderness. There is no guarding or rebound. Musculoskeletal:         General: No tenderness or deformity. Normal range of motion. Cervical back: Normal range of motion and neck supple. No edema, erythema or rigidity. No spinous process tenderness or muscular tenderness. Normal range of motion. Comments: Paraspinal muscle spasm of cervical spine with tenderness movement restricted   Lymphadenopathy:      Head:      Right side of head: No submental, submandibular, tonsillar, preauricular or posterior auricular adenopathy. Left side of head: No submental, submandibular, tonsillar, preauricular, posterior auricular or occipital adenopathy. Cervical: No cervical adenopathy. Right cervical: No superficial, deep or posterior cervical adenopathy. Left cervical: No superficial, deep or posterior cervical adenopathy. Upper Body:      Right upper body: No pectoral adenopathy. Left upper body: No pectoral adenopathy. Skin:     General: Skin is warm and dry. Coloration: Skin is not pale. Findings: No erythema or rash. Neurological:      Mental Status: She is alert and oriented to person, place, and time. Cranial Nerves: No cranial nerve deficit.       Sensory: No sensory deficit. Motor: Weakness present. No tremor, abnormal muscle tone or seizure activity. Coordination: Coordination normal.      Gait: Gait abnormal.      Deep Tendon Reflexes: Reflexes are normal and symmetric. Reflexes normal.   Psychiatric:         Behavior: Behavior normal.         Thought Content: Thought content normal.         Judgment: Judgment normal.         Lab Review   No visits with results within 2 Month(s) from this visit.    Latest known visit with results is:   Appointment on 03/23/2023   Component Date Value Ref Range Status   • WBC 03/23/2023 8.18  4.31 - 10.16 Thousand/uL Final   • RBC 03/23/2023 4.67  3.81 - 5.12 Million/uL Final   • Hemoglobin 03/23/2023 14.7  11.5 - 15.4 g/dL Final   • Hematocrit 03/23/2023 46.4 (H)  34.8 - 46.1 % Final   • MCV 03/23/2023 99 (H)  82 - 98 fL Final   • MCH 03/23/2023 31.5  26.8 - 34.3 pg Final   • MCHC 03/23/2023 31.7  31.4 - 37.4 g/dL Final   • RDW 03/23/2023 13.0  11.6 - 15.1 % Final   • MPV 03/23/2023 10.0  8.9 - 12.7 fL Final   • Platelets 50/00/0186 315  149 - 390 Thousands/uL Final   • nRBC 03/23/2023 0  /100 WBCs Final   • Neutrophils Relative 03/23/2023 60  43 - 75 % Final   • Immat GRANS % 03/23/2023 0  0 - 2 % Final   • Lymphocytes Relative 03/23/2023 31  14 - 44 % Final   • Monocytes Relative 03/23/2023 6  4 - 12 % Final   • Eosinophils Relative 03/23/2023 2  0 - 6 % Final   • Basophils Relative 03/23/2023 1  0 - 1 % Final   • Neutrophils Absolute 03/23/2023 4.89  1.85 - 7.62 Thousands/µL Final   • Immature Grans Absolute 03/23/2023 0.02  0.00 - 0.20 Thousand/uL Final   • Lymphocytes Absolute 03/23/2023 2.55  0.60 - 4.47 Thousands/µL Final   • Monocytes Absolute 03/23/2023 0.47  0.17 - 1.22 Thousand/µL Final   • Eosinophils Absolute 03/23/2023 0.20  0.00 - 0.61 Thousand/µL Final   • Basophils Absolute 03/23/2023 0.05  0.00 - 0.10 Thousands/µL Final   • Sodium 03/23/2023 138  135 - 147 mmol/L Final   • Potassium 03/23/2023 4.4  3.5 - 5.3 mmol/L Final   • Chloride 03/23/2023 106  96 - 108 mmol/L Final   • CO2 03/23/2023 29  21 - 32 mmol/L Final   • ANION GAP 03/23/2023 3 (L)  4 - 13 mmol/L Final   • BUN 03/23/2023 12  5 - 25 mg/dL Final   • Creatinine 03/23/2023 0.54 (L)  0.60 - 1.30 mg/dL Final    Standardized to IDMS reference method   • Glucose, Fasting 03/23/2023 93  65 - 99 mg/dL Final    Specimen collection should occur prior to Sulfasalazine administration due to the potential for falsely depressed results. Specimen collection should occur prior to Sulfapyridine administration due to the potential for falsely elevated results. • Calcium 03/23/2023 9.2  8.3 - 10.1 mg/dL Final   • AST 03/23/2023 16  5 - 45 U/L Final    Specimen collection should occur prior to Sulfasalazine administration due to the potential for falsely depressed results. • ALT 03/23/2023 22  12 - 78 U/L Final    Specimen collection should occur prior to Sulfasalazine and/or Sulfapyridine administration due to the potential for falsely depressed results. • Alkaline Phosphatase 03/23/2023 58  46 - 116 U/L Final   • Total Protein 03/23/2023 6.4  6.4 - 8.4 g/dL Final   • Albumin 03/23/2023 3.8  3.5 - 5.0 g/dL Final   • Total Bilirubin 03/23/2023 0.91  0.20 - 1.00 mg/dL Final    Use of this assay is not recommended for patients undergoing treatment with eltrombopag due to the potential for falsely elevated results. • eGFR 03/23/2023 93  ml/min/1.73sq m Final   • Hemoglobin A1C 03/23/2023 5.5  Normal 3.8-5.6%; PreDiabetic 5.7-6.4%;  Diabetic >=6.5%; Glycemic control for adults with diabetes <7.0% % Final   • EAG 03/23/2023 111  mg/dl Final   • Cholesterol 03/23/2023 202 (H)  See Comment mg/dL Final    Cholesterol:         Pediatric <18 Years        Desirable          <170 mg/dL      Borderline High    170-199 mg/dL      High               >=200 mg/dL        Adult >=18 Years            Desirable         <200 mg/dL      Borderline High   200-239 mg/dL      High              >239 mg/dL     • Triglycerides 03/23/2023 101  See Comment mg/dL Final    Triglyceride:     0-9Y            <75mg/dL     10Y-17Y         <90 mg/dL       >=18Y     Normal          <150 mg/dL     Borderline High 150-199 mg/dL     High            200-499 mg/dL        Very High       >499 mg/dL    Specimen collection should occur prior to N-Acetylcysteine or Metamizole administration due to the potential for falsely depressed results. • HDL, Direct 03/23/2023 81  >=50 mg/dL Final   • LDL Calculated 03/23/2023 101 (H)  0 - 100 mg/dL Final    This screening LDL is a calculated result. It does not have the accuracy of the Direct Measured LDL in the monitoring of patients with hyperlipidemia and/or statin therapy. Direct Measure LDL (GYJ240) must be ordered separately in these patients.   LDL Cholesterol:     Optimal           <100 mg/dl     Near Optimal      100-129 mg/dl     Above Optimal       Borderline High 130-159 mg/dl       High            160-189 mg/dl       Very High       >189 mg/dl          • Color, UA 03/23/2023 Light Yellow   Final   • Clarity, UA 03/23/2023 Clear   Final   • Specific Gravity, UA 03/23/2023 1.015  1.003 - 1.030 Final   • pH, UA 03/23/2023 6.0  4.5, 5.0, 5.5, 6.0, 6.5, 7.0, 7.5, 8.0 Final   • Leukocytes, UA 03/23/2023 Negative  Negative Final   • Nitrite, UA 03/23/2023 Negative  Negative Final   • Protein, UA 03/23/2023 Negative  Negative mg/dl Final   • Glucose, UA 03/23/2023 Negative  Negative mg/dl Final   • Ketones, UA 03/23/2023 Negative  Negative mg/dl Final   • Urobilinogen, UA 03/23/2023 <2.0  <2.0 mg/dl mg/dl Final   • Bilirubin, UA 03/23/2023 Negative  Negative Final   • Occult Blood, UA 03/23/2023 Negative  Negative Final   • RBC, UA 03/23/2023 None Seen  None Seen, 1-2 /hpf Final   • WBC, UA 03/23/2023 1-2  None Seen, 1-2 /hpf Final   • Epithelial Cells 03/23/2023 Occasional  None Seen, Occasional /hpf Final   • Bacteria, UA 03/23/2023 None Seen  None Seen, Occasional /hpf Final   • Vit D, 25-Hydroxy 03/23/2023 9.9 (L)  30.0 - 100.0 ng/mL Final   • Vitamin B-12 03/23/2023 1,923 (H)  100 - 900 pg/mL Final         Patient Instructions   Chronic Pain   WHAT YOU NEED TO KNOW:   Chronic pain is pain that does not get better for 3 months or longer. Chronic pain may hurt all the time, or come and go. DISCHARGE INSTRUCTIONS:   Call your local emergency number, or have someone else call (911 in the [address]) if:   You are breathing slower than normal, or you have trouble breathing. You cannot be awakened. You have a seizure. Call your doctor if:   Your heart feels like it is jumping or fluttering. You cannot think clearly. You have side effects from prescription pain medicine, such as itching, nausea, or vomiting. You have trouble sleeping. Your pain gets worse, even after you take medicine. You don't think the medicine is working. You have questions or concerns about your condition or care. Medicines: You may need any of the following:  Acetaminophen  decreases pain and fever. It is available without a doctor's order. Ask how much to take and how often to take it. Follow directions. Read the labels of all other medicines you are using to see if they also contain acetaminophen, or ask your doctor or pharmacist. Acetaminophen can cause liver damage if not taken correctly. NSAIDs , such as ibuprofen, help decrease swelling, pain, and fever. This medicine is available with or without a doctor's order. NSAIDs can cause stomach bleeding or kidney problems in certain people. If you take blood thinner medicine, always ask your healthcare provider if NSAIDs are safe for you. Always read the medicine label and follow directions. Prescription pain medicine  called narcotics or opioids may be given for certain types of chronic pain. Ask your healthcare provider how to take this medicine safely.     Anesthetics  can be rubbed on your skin or injected into a nerve or muscle to numb an area.    Other medicines  may reduce pain, anxiety, muscle tension, or swelling. Take your medicine as directed. Contact your healthcare provider if you think your medicine is not helping or if you have side effects. Tell your provider if you are allergic to any medicine. Keep a list of the medicines, vitamins, and herbs you take. Include the amounts, and when and why you take them. Bring the list or the pill bottles to follow-up visits. Carry your medicine list with you in case of an emergency. Manage your chronic pain:   Apply heat  on the area in pain for 20 to 30 minutes every 2 hours for as many days as directed. Heat helps decrease pain and muscle spasms. Apply ice  on the part of your body that hurts for 15 to 20 minutes every hour or as directed. Use an ice pack, or put crushed ice in a plastic bag. Cover it with a towel. Ice decreases pain and swelling, and helps prevent tissue damage. Go to physical therapy as directed. A physical therapist teaches you exercises to help improve movement and strength, and to decrease pain. Exercise for 30 minutes, 3 times a week. Regular physical activity can help decrease pain and improve your quality of life. Ask your healthcare provider about the best exercise plan for your type of pain. Get enough sleep. Create a relaxing bedtime routine. Go to sleep and wake up at the same time every day. Avoid caffeine in the afternoon. Talk with a counselor or therapist.  A type of counseling called cognitive behavioral therapy (CBT) can help your chronic pain by changing the way you think about it. CBT can also improve your mood, sleep, and ability to move. What you must know if you take narcotic pain medicine: You may need to take a bowel movement softener. The most common side effect of prescription pain medicine is constipation. Bowel movement softeners are available over the counter. Do not mix prescription pain medicines.   This can cause an overdose of medicine, which can become life-threatening. Read labels. Make sure you know the ingredients in all of your medicines. Do not drink alcohol  when you take prescription pain medicine. It is not safe to mix narcotics or opioids with alcohol or illegal drugs. Prescription pain medicine may impair your ability to drive or work safely. They may also cause dizziness and increase your risk for falling. Store prescription pain medicine in a safe location at home. Keep your medicine away from children and other people. Never share your medicine with anyone. Follow up with your doctor as directed: You may be referred to a pain specialist. Write down your questions so you remember to ask them during your visits. © Copyright [facility] 2022 Information is for End User's use only and may not be sold, redistributed or otherwise used for commercial purposes. The above information is an  only. It is not intended as medical advice for individual conditions or treatments. Talk to your doctor, nurse or pharmacist before following any medical regimen to see if it is safe and effective for you. Le Tovar MD        "This note has been constructed using a voice recognition system. Therefore there may be syntax, spelling, and/or grammatical errors.  Please call if you have any questions. "

## 2023-07-31 NOTE — PATIENT INSTRUCTIONS
Chronic Pain   WHAT YOU NEED TO KNOW:   Chronic pain is pain that does not get better for 3 months or longer. Chronic pain may hurt all the time, or come and go. DISCHARGE INSTRUCTIONS:   Call your local emergency number, or have someone else call (911 in the [address]) if:   You are breathing slower than normal, or you have trouble breathing. You cannot be awakened. You have a seizure. Call your doctor if:   Your heart feels like it is jumping or fluttering. You cannot think clearly. You have side effects from prescription pain medicine, such as itching, nausea, or vomiting. You have trouble sleeping. Your pain gets worse, even after you take medicine. You don't think the medicine is working. You have questions or concerns about your condition or care. Medicines: You may need any of the following:  Acetaminophen  decreases pain and fever. It is available without a doctor's order. Ask how much to take and how often to take it. Follow directions. Read the labels of all other medicines you are using to see if they also contain acetaminophen, or ask your doctor or pharmacist. Acetaminophen can cause liver damage if not taken correctly. NSAIDs , such as ibuprofen, help decrease swelling, pain, and fever. This medicine is available with or without a doctor's order. NSAIDs can cause stomach bleeding or kidney problems in certain people. If you take blood thinner medicine, always ask your healthcare provider if NSAIDs are safe for you. Always read the medicine label and follow directions. Prescription pain medicine  called narcotics or opioids may be given for certain types of chronic pain. Ask your healthcare provider how to take this medicine safely. Anesthetics  can be rubbed on your skin or injected into a nerve or muscle to numb an area. Other medicines  may reduce pain, anxiety, muscle tension, or swelling. Take your medicine as directed.   Contact your healthcare provider if you think your medicine is not helping or if you have side effects. Tell your provider if you are allergic to any medicine. Keep a list of the medicines, vitamins, and herbs you take. Include the amounts, and when and why you take them. Bring the list or the pill bottles to follow-up visits. Carry your medicine list with you in case of an emergency. Manage your chronic pain:   Apply heat  on the area in pain for 20 to 30 minutes every 2 hours for as many days as directed. Heat helps decrease pain and muscle spasms. Apply ice  on the part of your body that hurts for 15 to 20 minutes every hour or as directed. Use an ice pack, or put crushed ice in a plastic bag. Cover it with a towel. Ice decreases pain and swelling, and helps prevent tissue damage. Go to physical therapy as directed. A physical therapist teaches you exercises to help improve movement and strength, and to decrease pain. Exercise for 30 minutes, 3 times a week. Regular physical activity can help decrease pain and improve your quality of life. Ask your healthcare provider about the best exercise plan for your type of pain. Get enough sleep. Create a relaxing bedtime routine. Go to sleep and wake up at the same time every day. Avoid caffeine in the afternoon. Talk with a counselor or therapist.  A type of counseling called cognitive behavioral therapy (CBT) can help your chronic pain by changing the way you think about it. CBT can also improve your mood, sleep, and ability to move. What you must know if you take narcotic pain medicine: You may need to take a bowel movement softener. The most common side effect of prescription pain medicine is constipation. Bowel movement softeners are available over the counter. Do not mix prescription pain medicines. This can cause an overdose of medicine, which can become life-threatening. Read labels. Make sure you know the ingredients in all of your medicines.     Do not drink alcohol when you take prescription pain medicine. It is not safe to mix narcotics or opioids with alcohol or illegal drugs. Prescription pain medicine may impair your ability to drive or work safely. They may also cause dizziness and increase your risk for falling. Store prescription pain medicine in a safe location at home. Keep your medicine away from children and other people. Never share your medicine with anyone. Follow up with your doctor as directed: You may be referred to a pain specialist. Write down your questions so you remember to ask them during your visits. © Copyright [employer] 2022 Information is for End User's use only and may not be sold, redistributed or otherwise used for commercial purposes. The above information is an  only. It is not intended as medical advice for individual conditions or treatments. Talk to your doctor, nurse or pharmacist before following any medical regimen to see if it is safe and effective for you.

## 2023-07-31 NOTE — ASSESSMENT & PLAN NOTE
Patient on Tylenol No. 4 every 6 hours as needed for intractable pain lumbar radiculopathy cervical spine radiculopathy prescribed by pain management patient using only half a pill 2 or 3 times a day and no evidence of any overuse abuse or addiction

## 2023-08-23 ENCOUNTER — OFFICE VISIT (OUTPATIENT)
Dept: INTERNAL MEDICINE CLINIC | Facility: CLINIC | Age: 73
End: 2023-08-23
Payer: COMMERCIAL

## 2023-08-23 VITALS
WEIGHT: 97.4 LBS | HEART RATE: 60 BPM | BODY MASS INDEX: 17.26 KG/M2 | TEMPERATURE: 98 F | HEIGHT: 63 IN | RESPIRATION RATE: 14 BRPM | OXYGEN SATURATION: 97 % | DIASTOLIC BLOOD PRESSURE: 66 MMHG | SYSTOLIC BLOOD PRESSURE: 108 MMHG

## 2023-08-23 DIAGNOSIS — F41.3 OTHER MIXED ANXIETY DISORDERS: ICD-10-CM

## 2023-08-23 DIAGNOSIS — M54.12 CERVICAL RADICULOPATHY: ICD-10-CM

## 2023-08-23 DIAGNOSIS — J41.0 SIMPLE CHRONIC BRONCHITIS (HCC): ICD-10-CM

## 2023-08-23 DIAGNOSIS — E44.1 MILD PROTEIN-CALORIE MALNUTRITION (HCC): Primary | ICD-10-CM

## 2023-08-23 DIAGNOSIS — R21 RASH: ICD-10-CM

## 2023-08-23 DIAGNOSIS — K58.0 IRRITABLE BOWEL SYNDROME WITH DIARRHEA: ICD-10-CM

## 2023-08-23 PROCEDURE — 99213 OFFICE O/P EST LOW 20 MIN: CPT | Performed by: INTERNAL MEDICINE

## 2023-08-23 RX ORDER — DIPHENOXYLATE HYDROCHLORIDE AND ATROPINE SULFATE 2.5; .025 MG/1; MG/1
1 TABLET ORAL 2 TIMES DAILY PRN
Qty: 30 TABLET | Refills: 0 | Status: SHIPPED | OUTPATIENT
Start: 2023-08-23

## 2023-08-23 RX ORDER — CLOTRIMAZOLE 1 %
CREAM (GRAM) TOPICAL 2 TIMES DAILY
Qty: 28 G | Refills: 1 | Status: SHIPPED | OUTPATIENT
Start: 2023-08-23

## 2023-08-23 RX ORDER — MIRTAZAPINE 7.5 MG/1
7.5 TABLET, FILM COATED ORAL
Qty: 30 TABLET | Refills: 5 | Status: SHIPPED | OUTPATIENT
Start: 2023-08-23

## 2023-08-23 NOTE — ASSESSMENT & PLAN NOTE
Came in with diarrhea 4-5 times sometimes watery no blood with abdominal discomfort same symptoms as irritable bowel syndrome in the past was consuming excessive veggies given the diet instruction with Lomotil twice a day as needed for diarrhea

## 2023-08-23 NOTE — PROGRESS NOTES
Tobacco Cessation Counseling: Tobacco cessation counseling was provided.  The patient is sincerely urged to quit consumption of tobacco. She is not ready to quit tobacco.

## 2023-08-23 NOTE — ASSESSMENT & PLAN NOTE
Denies any wheezing intermittent difficulty breathing or exertion recommended Proventil and trilogy patient reluctant

## 2023-08-23 NOTE — ASSESSMENT & PLAN NOTE
Malnutrition Findings: Patient's BMI 17.25 advised to gain weight dietary instruction given high-calorie high-protein started Remeron 7.5 daily                                BMI Findings: Body mass index is 17.25 kg/m².

## 2023-08-23 NOTE — PROGRESS NOTES
Dr. Ingrid Chavez Office Visit Note  23     Blendklaus Melissa 74 y.o. female MRN: [id number]  : 1950    Assessment:     1. Mild protein-calorie malnutrition ([address])  Assessment & Plan:  Malnutrition Findings: Patient's BMI 17.25 advised to gain weight dietary instruction given high-calorie high-protein started Remeron 7.5 daily                                BMI Findings: Body mass index is 17.25 kg/m². 2. Irritable bowel syndrome with diarrhea  Assessment & Plan:  Came in with diarrhea 4-5 times sometimes watery no blood with abdominal discomfort same symptoms as irritable bowel syndrome in the past was consuming excessive veggies given the diet instruction with Lomotil twice a day as needed for diarrhea    Orders:  -     diphenoxylate-atropine (LOMOTIL) 2.5-0.025 mg per tablet; Take 1 tablet by mouth 2 (two) times a day as needed for diarrhea    3. Other mixed anxiety disorders  -     mirtazapine (REMERON) 7.5 MG tablet; Take 1 tablet (7.5 mg total) by mouth daily at bedtime    4. Rash  Assessment & Plan:  Suspected tinea over the gluteal area we will try clotrimazole cream    Orders:  -     clotrimazole (LOTRIMIN) 1 % cream; Apply topically 2 (two) times a day    5. Simple chronic bronchitis ([address])  Assessment & Plan:  Denies any wheezing intermittent difficulty breathing or exertion recommended Proventil and trilogy patient reluctant      6. Cervical radiculopathy  Assessment & Plan:  Persistent pain radiating both shoulder occipital area upper extremity with some numbness using local IBD cream patient seen by pain management on Tylenol No. 4 but patient not using quite often awaiting to be seen by pain management recommended a course short course of prednisone patient refused            Discussion Summary and Plan: Today's care plan and medications were reviewed with patient in detail and all their questions answered to their satisfaction.     Chief Complaint   Patient presents with   • Dizziness     Feels so tired, weak, diarrhea. Very gassy starts right when she gets up stops by supper. Hearts going out of rythem more. Subjective:  Came in complaint diarrhea almost for last more than a month same symptom is irritable bowel syndrome in the past was consuming excessive veggies and fruits and a rash in the gluteal area red with slight itching seen by cardiology awaiting stress test echocardiogram denies any rectal bleeding or difficulty breathing or chest pain      The following portions of the patient's history were reviewed and updated as appropriate: allergies, current medications, past family history, past medical history, past social history, past surgical history and problem list.    Review of Systems   Constitutional: Positive for activity change. Negative for appetite change, chills, diaphoresis, fatigue, fever and unexpected weight change. HENT: Negative for congestion, dental problem, drooling, ear discharge, ear pain, facial swelling, hearing loss, mouth sores, nosebleeds, postnasal drip, rhinorrhea, sinus pressure, sneezing, sore throat, tinnitus, trouble swallowing and voice change. Eyes: Negative for photophobia, pain, discharge, redness, itching and visual disturbance. Respiratory: Negative for apnea, cough, choking, chest tightness, shortness of breath, wheezing and stridor. Cardiovascular: Negative for chest pain, palpitations and leg swelling. Gastrointestinal: Positive for diarrhea. Negative for abdominal distention, anal bleeding, blood in stool, constipation, nausea, rectal pain and vomiting. Endocrine: Negative for cold intolerance, heat intolerance, polydipsia, polyphagia and polyuria. Genitourinary: Negative for decreased urine volume, difficulty urinating, dysuria, enuresis, flank pain, frequency, genital sores, hematuria and urgency. Musculoskeletal: Positive for back pain, gait problem and myalgias.  Negative for arthralgias, joint swelling, neck pain and neck stiffness. Skin: Negative for color change, pallor, rash and wound. Allergic/Immunologic: Negative. Negative for environmental allergies, food allergies and immunocompromised state. Neurological: Negative for dizziness, tremors, seizures, syncope, facial asymmetry, speech difficulty, weakness, light-headedness, numbness and headaches. Psychiatric/Behavioral: Negative for agitation, behavioral problems, confusion, decreased concentration, dysphoric mood, hallucinations, self-injury, sleep disturbance and suicidal ideas. The patient is not nervous/anxious and is not hyperactive.           Historical Information   Patient Active Problem List   Diagnosis   • Anxiety disorder   • Chronic back pain   • Chronic obstructive lung disease (HCC)   • Depressive disorder   • Hypertensive disorder   • Irritable bowel syndrome   • Lumbar post-laminectomy syndrome   • Lumbar radiculopathy   • Vertigo   • Cervical radiculopathy   • Continuous opioid dependence ([facility])   • B12 deficiency   • Mild protein-calorie malnutrition (HCC)   • Acute superficial gastritis without hemorrhage   • COVID-19   • Subclavian artery stenosis ([facility])   • Thyroid nodule   • Cysts of both ovaries   • Fall   • Acute midline low back pain without sciatica   • Rash     Past Medical History:   Diagnosis Date   • Abdominal pain    • Back pain    • Chest pain    • COPD (chronic obstructive pulmonary disease) (HCC)    • Dizziness    • Fibromyalgia    • GERD (gastroesophageal reflux disease)    • Hypertension    • Inflammatory bowel disease    • Lightheadedness    • RVOT ventricular tachycardia ([facility])    • SOB (shortness of breath)      Past Surgical History:   Procedure Laterality Date   • LUMBAR LAMINECTOMY     • SPINE SURGERY       Social History     Substance and Sexual Activity   Alcohol Use No     Social History     Substance and Sexual Activity   Drug Use No     Social History     Tobacco Use   Smoking Status Every Day   • Packs/day: 0.25 • Types: Cigarettes   Smokeless Tobacco Never     Family History   Problem Relation Age of Onset   • Heart disease Mother      Health Maintenance Due   Topic   • Hepatitis C Screening    • COVID-19 Vaccine (1)   • Pneumococcal Vaccine: 65+ Years (1 - PCV)   • Colorectal Cancer Screening    • Osteoporosis Screening    • Influenza Vaccine (1)   • BMI: Followup Plan       Meds/Allergies       Current Outpatient Medications:   •  Acetaminophen-Codeine (TYLENOL WITH CODEINE #4 PO), q6h, Disp: , Rfl:   •  clotrimazole (LOTRIMIN) 1 % cream, Apply topically 2 (two) times a day, Disp: 28 g, Rfl: 1  •  diphenoxylate-atropine (LOMOTIL) 2.5-0.025 mg per tablet, Take 1 tablet by mouth 2 (two) times a day as needed for diarrhea, Disp: 30 tablet, Rfl: 0  •  fluticasone (FLONASE) 50 mcg/act nasal spray, 1 spray into each nostril 2 (two) times a day, Disp: , Rfl:   •  MECLIZINE HCL PO, Take 6.25 mg by mouth as needed. , Disp: , Rfl:   •  metoprolol tartrate (LOPRESSOR) 25 mg tablet, Take 25 mg by mouth every 12 (twelve) hours, Disp: , Rfl:   •  mirtazapine (REMERON) 7.5 MG tablet, Take 1 tablet (7.5 mg total) by mouth daily at bedtime, Disp: 30 tablet, Rfl: 5      Objective:    Vitals:   /66   Pulse 60   Temp 98 °F (36.7 °C)   Resp 14   Ht 5' 3" (1.6 m)   Wt 44.2 kg (97 lb 6.4 oz)   SpO2 97%   BMI 17.25 kg/m²   Body mass index is 17.25 kg/m². Vitals:    08/23/23 1150   Weight: 44.2 kg (97 lb 6.4 oz)       Physical Exam  Vitals and nursing note reviewed. Constitutional:       General: She is not in acute distress. Appearance: She is well-developed. She is not ill-appearing, toxic-appearing or diaphoretic. Comments: Malnourished   HENT:      Head: Normocephalic and atraumatic. Right Ear: External ear normal.      Left Ear: External ear normal.      Nose: Nose normal.      Mouth/Throat:      Pharynx: No oropharyngeal exudate.    Eyes:      General: Lids are normal. Lids are everted, no foreign bodies appreciated. No scleral icterus. Right eye: No discharge. Left eye: No discharge. Conjunctiva/sclera: Conjunctivae normal.      Pupils: Pupils are equal, round, and reactive to light. Neck:      Thyroid: No thyromegaly. Vascular: Normal carotid pulses. No carotid bruit, hepatojugular reflux or JVD. Trachea: No tracheal tenderness or tracheal deviation. Cardiovascular:      Rate and Rhythm: Normal rate and regular rhythm. Pulses: Normal pulses. Heart sounds: Normal heart sounds. No murmur heard. No friction rub. No gallop. Pulmonary:      Effort: Pulmonary effort is normal. No respiratory distress. Breath sounds: Normal breath sounds. No stridor. No wheezing or rales. Chest:      Chest wall: No tenderness. Abdominal:      General: Bowel sounds are normal. There is no distension. Palpations: Abdomen is soft. There is no mass. Tenderness: There is no abdominal tenderness. There is no guarding or rebound. Musculoskeletal:         General: No tenderness or deformity. Normal range of motion. Cervical back: Normal range of motion and neck supple. No edema, erythema or rigidity. No spinous process tenderness or muscular tenderness. Normal range of motion. Comments: Paraspinal muscle spasm of cervical spine with tenderness movement restricted tenderness sacral area   Lymphadenopathy:      Head:      Right side of head: No submental, submandibular, tonsillar, preauricular or posterior auricular adenopathy. Left side of head: No submental, submandibular, tonsillar, preauricular, posterior auricular or occipital adenopathy. Cervical: No cervical adenopathy. Right cervical: No superficial, deep or posterior cervical adenopathy. Left cervical: No superficial, deep or posterior cervical adenopathy. Upper Body:      Right upper body: No pectoral adenopathy. Left upper body: No pectoral adenopathy.    Skin:     General: Skin is warm and dry. Coloration: Skin is not pale. Findings: No erythema or rash. Neurological:      General: No focal deficit present. Mental Status: She is alert and oriented to person, place, and time. Cranial Nerves: No cranial nerve deficit. Sensory: No sensory deficit. Motor: No tremor, abnormal muscle tone or seizure activity. Coordination: Coordination normal.      Gait: Gait normal.      Deep Tendon Reflexes: Reflexes are normal and symmetric. Reflexes normal.   Psychiatric:         Behavior: Behavior normal.         Thought Content: Thought content normal.         Judgment: Judgment normal.         Lab Review   No visits with results within 2 Month(s) from this visit.    Latest known visit with results is:   Appointment on 03/23/2023   Component Date Value Ref Range Status   • WBC 03/23/2023 8.18  4.31 - 10.16 Thousand/uL Final   • RBC 03/23/2023 4.67  3.81 - 5.12 Million/uL Final   • Hemoglobin 03/23/2023 14.7  11.5 - 15.4 g/dL Final   • Hematocrit 03/23/2023 46.4 (H)  34.8 - 46.1 % Final   • MCV 03/23/2023 99 (H)  82 - 98 fL Final   • MCH 03/23/2023 31.5  26.8 - 34.3 pg Final   • MCHC 03/23/2023 31.7  31.4 - 37.4 g/dL Final   • RDW 03/23/2023 13.0  11.6 - 15.1 % Final   • MPV 03/23/2023 10.0  8.9 - 12.7 fL Final   • Platelets 24/40/9836 315  149 - 390 Thousands/uL Final   • nRBC 03/23/2023 0  /100 WBCs Final   • Neutrophils Relative 03/23/2023 60  43 - 75 % Final   • Immat GRANS % 03/23/2023 0  0 - 2 % Final   • Lymphocytes Relative 03/23/2023 31  14 - 44 % Final   • Monocytes Relative 03/23/2023 6  4 - 12 % Final   • Eosinophils Relative 03/23/2023 2  0 - 6 % Final   • Basophils Relative 03/23/2023 1  0 - 1 % Final   • Neutrophils Absolute 03/23/2023 4.89  1.85 - 7.62 Thousands/µL Final   • Immature Grans Absolute 03/23/2023 0.02  0.00 - 0.20 Thousand/uL Final   • Lymphocytes Absolute 03/23/2023 2.55  0.60 - 4.47 Thousands/µL Final   • Monocytes Absolute 03/23/2023 0.47 0.17 - 1.22 Thousand/µL Final   • Eosinophils Absolute 03/23/2023 0.20  0.00 - 0.61 Thousand/µL Final   • Basophils Absolute 03/23/2023 0.05  0.00 - 0.10 Thousands/µL Final   • Sodium 03/23/2023 138  135 - 147 mmol/L Final   • Potassium 03/23/2023 4.4  3.5 - 5.3 mmol/L Final   • Chloride 03/23/2023 106  96 - 108 mmol/L Final   • CO2 03/23/2023 29  21 - 32 mmol/L Final   • ANION GAP 03/23/2023 3 (L)  4 - 13 mmol/L Final   • BUN 03/23/2023 12  5 - 25 mg/dL Final   • Creatinine 03/23/2023 0.54 (L)  0.60 - 1.30 mg/dL Final    Standardized to IDMS reference method   • Glucose, Fasting 03/23/2023 93  65 - 99 mg/dL Final    Specimen collection should occur prior to Sulfasalazine administration due to the potential for falsely depressed results. Specimen collection should occur prior to Sulfapyridine administration due to the potential for falsely elevated results. • Calcium 03/23/2023 9.2  8.3 - 10.1 mg/dL Final   • AST 03/23/2023 16  5 - 45 U/L Final    Specimen collection should occur prior to Sulfasalazine administration due to the potential for falsely depressed results. • ALT 03/23/2023 22  12 - 78 U/L Final    Specimen collection should occur prior to Sulfasalazine and/or Sulfapyridine administration due to the potential for falsely depressed results. • Alkaline Phosphatase 03/23/2023 58  46 - 116 U/L Final   • Total Protein 03/23/2023 6.4  6.4 - 8.4 g/dL Final   • Albumin 03/23/2023 3.8  3.5 - 5.0 g/dL Final   • Total Bilirubin 03/23/2023 0.91  0.20 - 1.00 mg/dL Final    Use of this assay is not recommended for patients undergoing treatment with eltrombopag due to the potential for falsely elevated results. • eGFR 03/23/2023 93  ml/min/1.73sq m Final   • Hemoglobin A1C 03/23/2023 5.5  Normal 3.8-5.6%; PreDiabetic 5.7-6.4%;  Diabetic >=6.5%; Glycemic control for adults with diabetes <7.0% % Final   • EAG 03/23/2023 111  mg/dl Final   • Cholesterol 03/23/2023 202 (H)  See Comment mg/dL Final    Cholesterol: Pediatric <18 Years        Desirable          <170 mg/dL      Borderline High    170-199 mg/dL      High               >=200 mg/dL        Adult >=18 Years            Desirable         <200 mg/dL      Borderline High   200-239 mg/dL      High              >239 mg/dL     • Triglycerides 03/23/2023 101  See Comment mg/dL Final    Triglyceride:     0-9Y            <75mg/dL     10Y-17Y         <90 mg/dL       >=18Y     Normal          <150 mg/dL     Borderline High 150-199 mg/dL     High            200-499 mg/dL        Very High       >499 mg/dL    Specimen collection should occur prior to N-Acetylcysteine or Metamizole administration due to the potential for falsely depressed results. • HDL, Direct 03/23/2023 81  >=50 mg/dL Final   • LDL Calculated 03/23/2023 101 (H)  0 - 100 mg/dL Final    This screening LDL is a calculated result. It does not have the accuracy of the Direct Measured LDL in the monitoring of patients with hyperlipidemia and/or statin therapy. Direct Measure LDL ([id number]) must be ordered separately in these patients.   LDL Cholesterol:     Optimal           <100 mg/dl     Near Optimal      100-129 mg/dl     Above Optimal       Borderline High 130-159 mg/dl       High            160-189 mg/dl       Very High       >189 mg/dl          • Color, UA 03/23/2023 Light Yellow   Final   • Clarity, UA 03/23/2023 Clear   Final   • Specific Gravity, UA 03/23/2023 1.015  1.003 - 1.030 Final   • pH, UA 03/23/2023 6.0  4.5, 5.0, 5.5, 6.0, 6.5, 7.0, 7.5, 8.0 Final   • Leukocytes, UA 03/23/2023 Negative  Negative Final   • Nitrite, UA 03/23/2023 Negative  Negative Final   • Protein, UA 03/23/2023 Negative  Negative mg/dl Final   • Glucose, UA 03/23/2023 Negative  Negative mg/dl Final   • Ketones, UA 03/23/2023 Negative  Negative mg/dl Final   • Urobilinogen, UA 03/23/2023 <2.0  <2.0 mg/dl mg/dl Final   • Bilirubin, UA 03/23/2023 Negative  Negative Final   • Occult Blood, UA 03/23/2023 Negative  Negative Final   • RBC, UA 03/23/2023 None Seen  None Seen, 1-2 /hpf Final   • WBC, UA 03/23/2023 1-2  None Seen, 1-2 /hpf Final   • Epithelial Cells 03/23/2023 Occasional  None Seen, Occasional /hpf Final   • Bacteria, UA 03/23/2023 None Seen  None Seen, Occasional /hpf Final   • Vit D, 25-Hydroxy 03/23/2023 9.9 (L)  30.0 - 100.0 ng/mL Final   • Vitamin B-12 03/23/2023 1,923 (H)  100 - 900 pg/mL Final         Patient Instructions   Nutrition Tips for Relief of Diarrhea   WHAT YOU NEED TO KNOW:   There are diet changes you can make to help relieve or stop diarrhea. These changes include limiting or avoiding foods and liquids that are high in sugar, fat, fiber, and lactose. Lactose is a sugar found in milk products. Milk products can cause diarrhea in people who are lactose intolerant. You should also drink extra liquids to replace fluids that are lost when you have diarrhea. Diarrhea can lead to dehydration. DISCHARGE INSTRUCTIONS:   Foods to limit or avoid:   Dairy:      Whole milk    Half-and-half, cream, and sour cream    Regular (whole milk) ice cream    Grains:      Whole wheat and whole grain breads, pasta, cereals, and crackers    Brown and wild rice    Breads and cereals with seeds or nuts    Popcorn    Fruit and vegetables:       All raw fruits, except bananas and melon    Dried fruits, including prunes and raisins    Canned fruit in heavy syrup    Prune juice and any fruit juice with pulp    Raw vegetables, except lettuce     Fried vegetables    Corn, raw and cooked broccoli, cabbage, cauliflower, and janell greens    Protein:      Fried meat, poultry, and fish    High-fat luncheon meats, such as bologna    Fatty meats, such as sausage, manzanares, and hot dogs    Beans and nuts    Liquids:      Sodas and fruit-flavored drinks    Drinks that contain caffeine, such as energy drinks, coffee, and tea     Drinks that contain alcohol or sugar alcohol, such as sorbitol    Foods and liquids you may eat or drink:  Most people can tolerate the foods and liquids listed below. If any of them make your symptoms worse, stop eating or drinking them until you feel better. If you are lactose intolerant, avoid milk products. Dairy:      Skim or low-fat milk or evaporated milk    Soy milk or buttermilk     Low-fat, part-skim, and aged cheese    Yogurt, low-fat ice cream, or sherbert    Grains:  (Choose foods with less than 2 grams of dietary fiber per serving.)     White or refined flour breads, bagels, pasta, and crackers    Cold or hot cereals made from white or refined flour such as puffed rice, cornflakes, or cream of wheat    White rice    Fruit and vegetables:      Bananas or melon    Fruit juice without pulp, except prune juice    Canned fruit in juice or light syrup    Lettuce and most well-cooked vegetables without seeds or skins     Strained vegetable juice    Protein:      Tender, well-cooked meat, poultry, or fish    Well-cooked eggs or soy foods (cooked without added fat)    Smooth nut butters    Fats:  (Limit fats to less than 8 teaspoons a day)     Oil, butter, or margarine, or mayonnaise    Cream cheese or salad dressings    Liquids: For infants, breast milk or formula    Oral rehydration solution     Decaffeinated coffee or caffeine-free teas    Soft drinks without caffeine    Other guidelines to follow:   Drink liquids as directed. You may need to drink more liquids than usual to prevent dehydration. Ask how much liquid to drink each day and which liquids are best for you. You may need to drink an oral rehydration solution (ORS). An ORS helps replace fluids and electrolytes that you lose when you have diarrhea. Eat small meals or snacks every 3 to 4 hours  instead of large meals. Continue eating even if you still have diarrhea. Your diarrhea will continue for a few days but should gradually go away.     © Copyright [facility] 2022 Information is for End User's use only and may not be sold, redistributed or otherwise used for commercial purposes. The above information is an  only. It is not intended as medical advice for individual conditions or treatments. Talk to your doctor, nurse or pharmacist before following any medical regimen to see if it is safe and effective for you. Asher Chinchilla MD        "This note has been constructed using a voice recognition system. Therefore there may be syntax, spelling, and/or grammatical errors.  Please call if you have any questions. "

## 2023-08-23 NOTE — PATIENT INSTRUCTIONS
Nutrition Tips for Relief of Diarrhea   WHAT YOU NEED TO KNOW:   There are diet changes you can make to help relieve or stop diarrhea. These changes include limiting or avoiding foods and liquids that are high in sugar, fat, fiber, and lactose. Lactose is a sugar found in milk products. Milk products can cause diarrhea in people who are lactose intolerant. You should also drink extra liquids to replace fluids that are lost when you have diarrhea. Diarrhea can lead to dehydration. DISCHARGE INSTRUCTIONS:   Foods to limit or avoid:   Dairy:      Whole milk    Half-and-half, cream, and sour cream    Regular (whole milk) ice cream    Grains:      Whole wheat and whole grain breads, pasta, cereals, and crackers    Brown and wild rice    Breads and cereals with seeds or nuts    Popcorn    Fruit and vegetables: All raw fruits, except bananas and melon    Dried fruits, including prunes and raisins    Canned fruit in heavy syrup    Prune juice and any fruit juice with pulp    Raw vegetables, except lettuce     Fried vegetables    Corn, raw and cooked broccoli, cabbage, cauliflower, and janell greens    Protein:      Fried meat, poultry, and fish    High-fat luncheon meats, such as bologna    Fatty meats, such as sausage, manzanares, and hot dogs    Beans and nuts    Liquids:      Sodas and fruit-flavored drinks    Drinks that contain caffeine, such as energy drinks, coffee, and tea     Drinks that contain alcohol or sugar alcohol, such as sorbitol    Foods and liquids you may eat or drink:  Most people can tolerate the foods and liquids listed below. If any of them make your symptoms worse, stop eating or drinking them until you feel better. If you are lactose intolerant, avoid milk products.   Dairy:      Skim or low-fat milk or evaporated milk    Soy milk or buttermilk     Low-fat, part-skim, and aged cheese    Yogurt, low-fat ice cream, or sherbert    Grains:  (Choose foods with less than 2 grams of dietary fiber per serving.)     White or refined flour breads, bagels, pasta, and crackers    Cold or hot cereals made from white or refined flour such as puffed rice, cornflakes, or cream of wheat    White rice    Fruit and vegetables:      Bananas or melon    Fruit juice without pulp, except prune juice    Canned fruit in juice or light syrup    Lettuce and most well-cooked vegetables without seeds or skins     Strained vegetable juice    Protein:      Tender, well-cooked meat, poultry, or fish    Well-cooked eggs or soy foods (cooked without added fat)    Smooth nut butters    Fats:  (Limit fats to less than 8 teaspoons a day)     Oil, butter, or margarine, or mayonnaise    Cream cheese or salad dressings    Liquids: For infants, breast milk or formula    Oral rehydration solution     Decaffeinated coffee or caffeine-free teas    Soft drinks without caffeine    Other guidelines to follow:   Drink liquids as directed. You may need to drink more liquids than usual to prevent dehydration. Ask how much liquid to drink each day and which liquids are best for you. You may need to drink an oral rehydration solution (ORS). An ORS helps replace fluids and electrolytes that you lose when you have diarrhea. Eat small meals or snacks every 3 to 4 hours  instead of large meals. Continue eating even if you still have diarrhea. Your diarrhea will continue for a few days but should gradually go away. © Copyright [employer] 2022 Information is for End User's use only and may not be sold, redistributed or otherwise used for commercial purposes. The above information is an  only. It is not intended as medical advice for individual conditions or treatments. Talk to your doctor, nurse or pharmacist before following any medical regimen to see if it is safe and effective for you.

## 2023-09-27 ENCOUNTER — RA CDI HCC (OUTPATIENT)
Dept: OTHER | Facility: HOSPITAL | Age: 73
End: 2023-09-27

## 2023-09-27 NOTE — PROGRESS NOTES
[address] [facility] coding opportunities       Chart reviewed, no opportunity found: CHART REVIEWED, NO OPPORTUNITY FOUND        Patients Insurance     [facility]ic[facility]

## 2023-10-19 ENCOUNTER — OFFICE VISIT (OUTPATIENT)
Dept: INTERNAL MEDICINE CLINIC | Facility: CLINIC | Age: 73
End: 2023-10-19
Payer: COMMERCIAL

## 2023-10-19 VITALS
SYSTOLIC BLOOD PRESSURE: 118 MMHG | DIASTOLIC BLOOD PRESSURE: 80 MMHG | HEIGHT: 63 IN | HEART RATE: 84 BPM | WEIGHT: 97.2 LBS | BODY MASS INDEX: 17.22 KG/M2 | OXYGEN SATURATION: 96 % | TEMPERATURE: 98 F

## 2023-10-19 DIAGNOSIS — R11.0 NAUSEA: ICD-10-CM

## 2023-10-19 DIAGNOSIS — E44.1 MILD PROTEIN-CALORIE MALNUTRITION (HCC): Primary | ICD-10-CM

## 2023-10-19 DIAGNOSIS — K21.9 GASTROESOPHAGEAL REFLUX DISEASE WITHOUT ESOPHAGITIS: ICD-10-CM

## 2023-10-19 DIAGNOSIS — K58.0 IRRITABLE BOWEL SYNDROME WITH DIARRHEA: ICD-10-CM

## 2023-10-19 DIAGNOSIS — I10 PRIMARY HYPERTENSION: ICD-10-CM

## 2023-10-19 PROCEDURE — 99213 OFFICE O/P EST LOW 20 MIN: CPT | Performed by: INTERNAL MEDICINE

## 2023-10-19 RX ORDER — FAMOTIDINE 20 MG/1
20 TABLET, FILM COATED ORAL
Qty: 90 TABLET | Refills: 3 | Status: SHIPPED | OUTPATIENT
Start: 2023-10-19 | End: 2024-10-13

## 2023-10-19 RX ORDER — ONDANSETRON 4 MG/1
TABLET, ORALLY DISINTEGRATING ORAL
Qty: 15 TABLET | Refills: 2 | Status: SHIPPED | OUTPATIENT
Start: 2023-10-19 | End: 2023-10-20

## 2023-10-19 NOTE — PROGRESS NOTES
Dr. Meneses Spar Office Visit Note  10/19/23     Lu Jasso 74 y.o. female MRN: [id number]  : 1950    Assessment:     1. Mild protein-calorie malnutrition ([address])  Assessment & Plan:  Malnutrition Findings: Patient's BMI 17.25 advised to gain weight dietary instruction given high-calorie high-protein started Remeron 7.5 daily                                BMI Findings:   Advised to increase calorie protein intake to gain weight instructed nutritionist consult counseling        Body mass index is 17.22 kg/m². 2. Gastroesophageal reflux disease without esophagitis  Assessment & Plan:  Came in complaining of intermittent burping and reflux symptoms heartburn with nausea patient had used Pepcid in the past was helping stop taking for last 6 months restart Pepcid 20 mg daily instructed about the diet and if needed follow-up with the GI    Orders:  -     famotidine (PEPCID) 20 mg tablet; Take 1 tablet (20 mg total) by mouth daily at bedtime  -     ondansetron (ZOFRAN-ODT) 4 mg disintegrating tablet; Take 1 pill twice a day as needed for nausea under the tongue    3. Nausea  Assessment & Plan:  Patient having nausea especially in the morning for last 5 days treated with Zofran ODT and liquid diet and also start Pepcid 20 mg daily suspected GERD induced and or viral gastritis    Orders:  -     famotidine (PEPCID) 20 mg tablet; Take 1 tablet (20 mg total) by mouth daily at bedtime  -     ondansetron (ZOFRAN-ODT) 4 mg disintegrating tablet; Take 1 pill twice a day as needed for nausea under the tongue    4. Irritable bowel syndrome with diarrhea  Assessment & Plan:  Instructed about the diet symptoms seems to be controlled patient followed by GI to use Lomotil as needed for diarrhea      5.  Primary hypertension  Assessment & Plan:  Continue low-salt diet blood pressure controlled on metoprolol followed by cardiology blood pressure controlled continue metoprolol low-salt diet            Discussion Summary and Plan:  Today's care plan and medications were reviewed with patient in detail and all their questions answered to their satisfaction. Chief Complaint   Patient presents with   • Nausea     Nausea everyday. Very tired, weak feeling cold all the time, heart beating out of rhythm more than usual.       Subjective:  Patient came in complaining of nausea for last 5 days in the morning especially and improves by noon time not able to eat or drink in the morning denies any vomiting some diffuse discomfort abdominal and tried over-the-counter Emetrol was causing diarrhea so stopped denies any fever chills or coughing no chest pain        The following portions of the patient's history were reviewed and updated as appropriate: allergies, current medications, past family history, past medical history, past social history, past surgical history and problem list.    Review of Systems   Constitutional:  Negative for activity change, appetite change, chills, diaphoresis, fatigue, fever and unexpected weight change. HENT:  Negative for congestion, dental problem, drooling, ear discharge, ear pain, facial swelling, hearing loss, mouth sores, nosebleeds, postnasal drip, rhinorrhea, sinus pressure, sneezing, sore throat, tinnitus, trouble swallowing and voice change. Eyes:  Negative for photophobia, pain, discharge, redness, itching and visual disturbance. Respiratory:  Negative for apnea, cough, choking, chest tightness, shortness of breath, wheezing and stridor. Cardiovascular:  Negative for chest pain, palpitations and leg swelling. Gastrointestinal:  Positive for nausea. Negative for abdominal distention, abdominal pain, anal bleeding, blood in stool, constipation, diarrhea, rectal pain and vomiting. Endocrine: Negative for cold intolerance, heat intolerance, polydipsia, polyphagia and polyuria.    Genitourinary:  Negative for decreased urine volume, difficulty urinating, dysuria, enuresis, flank pain, frequency, genital sores, hematuria and urgency. Musculoskeletal:  Negative for arthralgias, back pain, gait problem, joint swelling, myalgias, neck pain and neck stiffness. Skin:  Negative for color change, pallor, rash and wound. Allergic/Immunologic: Negative. Negative for environmental allergies, food allergies and immunocompromised state. Neurological:  Negative for dizziness, tremors, seizures, syncope, facial asymmetry, speech difficulty, weakness, light-headedness, numbness and headaches. Psychiatric/Behavioral:  Negative for agitation, behavioral problems, confusion, decreased concentration, dysphoric mood, hallucinations, self-injury, sleep disturbance and suicidal ideas. The patient is not nervous/anxious and is not hyperactive.           Historical Information   Patient Active Problem List   Diagnosis   • Anxiety disorder   • Chronic back pain   • Chronic obstructive lung disease (HCC)   • Depressive disorder   • Hypertensive disorder   • Irritable bowel syndrome   • Lumbar post-laminectomy syndrome   • Lumbar radiculopathy   • Vertigo   • Cervical radiculopathy   • Continuous opioid dependence (HCC)   • B12 deficiency   • Mild protein-calorie malnutrition (HCC)   • Acute superficial gastritis without hemorrhage   • COVID-19   • Subclavian artery stenosis ([facility])   • Thyroid nodule   • Cysts of both ovaries   • Fall   • Acute midline low back pain without sciatica   • Rash   • Nausea   • Gastroesophageal reflux disease without esophagitis     Past Medical History:   Diagnosis Date   • Abdominal pain    • Back pain    • Chest pain    • COPD (chronic obstructive pulmonary disease) (HCC)    • Dizziness    • Fibromyalgia    • GERD (gastroesophageal reflux disease)    • Hypertension    • Inflammatory bowel disease    • Lightheadedness    • RVOT ventricular tachycardia ([facility])    • SOB (shortness of breath)      Past Surgical History:   Procedure Laterality Date   • LUMBAR LAMINECTOMY     • SPINE SURGERY Social History     Substance and Sexual Activity   Alcohol Use No     Social History     Substance and Sexual Activity   Drug Use No     Social History     Tobacco Use   Smoking Status Every Day   • Packs/day: 0.25   • Types: Cigarettes   Smokeless Tobacco Never     Family History   Problem Relation Age of Onset   • Heart disease Mother      Health Maintenance Due   Topic   • Hepatitis C Screening    • COVID-19 Vaccine (1)   • Pneumococcal Vaccine: 65+ Years (1 - PCV)   • Colorectal Cancer Screening    • Osteoporosis Screening    • Influenza Vaccine (1)   • BMI: Followup Plan       Meds/Allergies       Current Outpatient Medications:   •  Acetaminophen-Codeine (TYLENOL WITH CODEINE #4 PO), q6h, Disp: , Rfl:   •  clotrimazole (LOTRIMIN) 1 % cream, Apply topically 2 (two) times a day, Disp: 28 g, Rfl: 1  •  famotidine (PEPCID) 20 mg tablet, Take 1 tablet (20 mg total) by mouth daily at bedtime, Disp: 90 tablet, Rfl: 3  •  fluticasone (FLONASE) 50 mcg/act nasal spray, 1 spray into each nostril 2 (two) times a day, Disp: , Rfl:   •  MECLIZINE HCL PO, Take 6.25 mg by mouth as needed. , Disp: , Rfl:   •  metoprolol tartrate (LOPRESSOR) 25 mg tablet, Take 25 mg by mouth every 12 (twelve) hours, Disp: , Rfl:   •  ondansetron (ZOFRAN-ODT) 4 mg disintegrating tablet, Take 1 pill twice a day as needed for nausea under the tongue, Disp: 15 tablet, Rfl: 2  •  diphenoxylate-atropine (LOMOTIL) 2.5-0.025 mg per tablet, Take 1 tablet by mouth 2 (two) times a day as needed for diarrhea (Patient not taking: Reported on 10/19/2023), Disp: 30 tablet, Rfl: 0  •  mirtazapine (REMERON) 7.5 MG tablet, Take 1 tablet (7.5 mg total) by mouth daily at bedtime, Disp: 30 tablet, Rfl: 5      Objective:    Vitals:   /80   Pulse 84   Temp 98 °F (36.7 °C)   Ht 5' 3" (1.6 m)   Wt 44.1 kg (97 lb 3.2 oz)   SpO2 96%   BMI 17.22 kg/m²   Body mass index is 17.22 kg/m².   Vitals:    10/19/23 1310   Weight: 44.1 kg (97 lb 3.2 oz)       Physical Exam  Vitals and nursing note reviewed. Constitutional:       General: She is not in acute distress. Appearance: She is well-developed. She is not ill-appearing, toxic-appearing or diaphoretic. HENT:      Head: Normocephalic and atraumatic. Right Ear: External ear normal.      Left Ear: External ear normal.      Nose: Nose normal.      Mouth/Throat:      Pharynx: No oropharyngeal exudate. Eyes:      General: Lids are normal. Lids are everted, no foreign bodies appreciated. No scleral icterus. Right eye: No discharge. Left eye: No discharge. Conjunctiva/sclera: Conjunctivae normal.      Pupils: Pupils are equal, round, and reactive to light. Neck:      Thyroid: No thyromegaly. Vascular: Normal carotid pulses. No carotid bruit, hepatojugular reflux or JVD. Trachea: No tracheal tenderness or tracheal deviation. Cardiovascular:      Rate and Rhythm: Regular rhythm. Tachycardia present. Pulses: Normal pulses. Heart sounds: Murmur heard. No friction rub. No gallop. Pulmonary:      Effort: Pulmonary effort is normal. No respiratory distress. Breath sounds: Normal breath sounds. No stridor. No wheezing or rales. Chest:      Chest wall: No tenderness. Abdominal:      General: Bowel sounds are normal. There is no distension. Palpations: Abdomen is soft. There is no mass. Tenderness: There is no abdominal tenderness. There is no guarding or rebound. Musculoskeletal:         General: No tenderness or deformity. Normal range of motion. Cervical back: Normal range of motion and neck supple. No edema, erythema or rigidity. No spinous process tenderness or muscular tenderness. Normal range of motion. Lymphadenopathy:      Head:      Right side of head: No submental, submandibular, tonsillar, preauricular or posterior auricular adenopathy.       Left side of head: No submental, submandibular, tonsillar, preauricular, posterior auricular or occipital adenopathy. Cervical: No cervical adenopathy. Right cervical: No superficial, deep or posterior cervical adenopathy. Left cervical: No superficial, deep or posterior cervical adenopathy. Upper Body:      Right upper body: No pectoral adenopathy. Left upper body: No pectoral adenopathy. Skin:     General: Skin is warm and dry. Coloration: Skin is not pale. Findings: No erythema or rash. Neurological:      General: No focal deficit present. Mental Status: She is alert and oriented to person, place, and time. Cranial Nerves: No cranial nerve deficit. Sensory: No sensory deficit. Motor: No tremor, abnormal muscle tone or seizure activity. Coordination: Coordination normal.      Gait: Gait normal.      Deep Tendon Reflexes: Reflexes are normal and symmetric. Reflexes normal.   Psychiatric:         Behavior: Behavior normal.         Thought Content: Thought content normal.         Judgment: Judgment normal.         Lab Review   No visits with results within 2 Month(s) from this visit.    Latest known visit with results is:   Appointment on 03/23/2023   Component Date Value Ref Range Status   • WBC 03/23/2023 8.18  4.31 - 10.16 Thousand/uL Final   • RBC 03/23/2023 4.67  3.81 - 5.12 Million/uL Final   • Hemoglobin 03/23/2023 14.7  11.5 - 15.4 g/dL Final   • Hematocrit 03/23/2023 46.4 (H)  34.8 - 46.1 % Final   • MCV 03/23/2023 99 (H)  82 - 98 fL Final   • MCH 03/23/2023 31.5  26.8 - 34.3 pg Final   • MCHC 03/23/2023 31.7  31.4 - 37.4 g/dL Final   • RDW 03/23/2023 13.0  11.6 - 15.1 % Final   • MPV 03/23/2023 10.0  8.9 - 12.7 fL Final   • Platelets 70/12/8682 315  149 - 390 Thousands/uL Final   • nRBC 03/23/2023 0  /100 WBCs Final   • Neutrophils Relative 03/23/2023 60  43 - 75 % Final   • Immat GRANS % 03/23/2023 0  0 - 2 % Final   • Lymphocytes Relative 03/23/2023 31  14 - 44 % Final   • Monocytes Relative 03/23/2023 6  4 - 12 % Final   • Eosinophils Relative 03/23/2023 2  0 - 6 % Final   • Basophils Relative 03/23/2023 1  0 - 1 % Final   • Neutrophils Absolute 03/23/2023 4.89  1.85 - 7.62 Thousands/µL Final   • Immature Grans Absolute 03/23/2023 0.02  0.00 - 0.20 Thousand/uL Final   • Lymphocytes Absolute 03/23/2023 2.55  0.60 - 4.47 Thousands/µL Final   • Monocytes Absolute 03/23/2023 0.47  0.17 - 1.22 Thousand/µL Final   • Eosinophils Absolute 03/23/2023 0.20  0.00 - 0.61 Thousand/µL Final   • Basophils Absolute 03/23/2023 0.05  0.00 - 0.10 Thousands/µL Final   • Sodium 03/23/2023 138  135 - 147 mmol/L Final   • Potassium 03/23/2023 4.4  3.5 - 5.3 mmol/L Final   • Chloride 03/23/2023 106  96 - 108 mmol/L Final   • CO2 03/23/2023 29  21 - 32 mmol/L Final   • ANION GAP 03/23/2023 3 (L)  4 - 13 mmol/L Final   • BUN 03/23/2023 12  5 - 25 mg/dL Final   • Creatinine 03/23/2023 0.54 (L)  0.60 - 1.30 mg/dL Final    Standardized to IDMS reference method   • Glucose, Fasting 03/23/2023 93  65 - 99 mg/dL Final    Specimen collection should occur prior to Sulfasalazine administration due to the potential for falsely depressed results. Specimen collection should occur prior to Sulfapyridine administration due to the potential for falsely elevated results. • Calcium 03/23/2023 9.2  8.3 - 10.1 mg/dL Final   • AST 03/23/2023 16  5 - 45 U/L Final    Specimen collection should occur prior to Sulfasalazine administration due to the potential for falsely depressed results. • ALT 03/23/2023 22  12 - 78 U/L Final    Specimen collection should occur prior to Sulfasalazine and/or Sulfapyridine administration due to the potential for falsely depressed results.     • Alkaline Phosphatase 03/23/2023 58  46 - 116 U/L Final   • Total Protein 03/23/2023 6.4  6.4 - 8.4 g/dL Final   • Albumin 03/23/2023 3.8  3.5 - 5.0 g/dL Final   • Total Bilirubin 03/23/2023 0.91  0.20 - 1.00 mg/dL Final    Use of this assay is not recommended for patients undergoing treatment with eltrombopag due to the potential for falsely elevated results. • eGFR 03/23/2023 93  ml/min/1.73sq m Final   • Hemoglobin A1C 03/23/2023 5.5  Normal 3.8-5.6%; PreDiabetic 5.7-6.4%; Diabetic >=6.5%; Glycemic control for adults with diabetes <7.0% % Final   • EAG 03/23/2023 111  mg/dl Final   • Cholesterol 03/23/2023 202 (H)  See Comment mg/dL Final    Cholesterol:         Pediatric <18 Years        Desirable          <170 mg/dL      Borderline High    170-199 mg/dL      High               >=200 mg/dL        Adult >=18 Years            Desirable         <200 mg/dL      Borderline High   200-239 mg/dL      High              >239 mg/dL     • Triglycerides 03/23/2023 101  See Comment mg/dL Final    Triglyceride:     0-9Y            <75mg/dL     10Y-17Y         <90 mg/dL       >=18Y     Normal          <150 mg/dL     Borderline High 150-199 mg/dL     High            200-499 mg/dL        Very High       >499 mg/dL    Specimen collection should occur prior to N-Acetylcysteine or Metamizole administration due to the potential for falsely depressed results. • HDL, Direct 03/23/2023 81  >=50 mg/dL Final   • LDL Calculated 03/23/2023 101 (H)  0 - 100 mg/dL Final    This screening LDL is a calculated result. It does not have the accuracy of the Direct Measured LDL in the monitoring of patients with hyperlipidemia and/or statin therapy. Direct Measure LDL (DRK652) must be ordered separately in these patients.   LDL Cholesterol:     Optimal           <100 mg/dl     Near Optimal      100-129 mg/dl     Above Optimal       Borderline High 130-159 mg/dl       High            160-189 mg/dl       Very High       >189 mg/dl          • Color, UA 03/23/2023 Light Yellow   Final   • Clarity, UA 03/23/2023 Clear   Final   • Specific Gravity, UA 03/23/2023 1.015  1.003 - 1.030 Final   • pH, UA 03/23/2023 6.0  4.5, 5.0, 5.5, 6.0, 6.5, 7.0, 7.5, 8.0 Final   • Leukocytes, UA 03/23/2023 Negative  Negative Final   • Nitrite, UA 03/23/2023 Negative  Negative Final   • Protein, UA 03/23/2023 Negative  Negative mg/dl Final   • Glucose, UA 03/23/2023 Negative  Negative mg/dl Final   • Ketones, UA 03/23/2023 Negative  Negative mg/dl Final   • Urobilinogen, UA 03/23/2023 <2.0  <2.0 mg/dl mg/dl Final   • Bilirubin, UA 03/23/2023 Negative  Negative Final   • Occult Blood, UA 03/23/2023 Negative  Negative Final   • RBC, UA 03/23/2023 None Seen  None Seen, 1-2 /hpf Final   • WBC, UA 03/23/2023 1-2  None Seen, 1-2 /hpf Final   • Epithelial Cells 03/23/2023 Occasional  None Seen, Occasional /hpf Final   • Bacteria, UA 03/23/2023 None Seen  None Seen, Occasional /hpf Final   • Vit D, 25-Hydroxy 03/23/2023 9.9 (L)  30.0 - 100.0 ng/mL Final   • Vitamin B-12 03/23/2023 1,923 (H)  100 - 900 pg/mL Final         Patient Instructions   Nausea and Vomiting in Pregnancy   WHAT YOU NEED TO KNOW:   Nausea and vomiting can happen any time of day. These symptoms usually start before the 9th week of pregnancy, and end by the 14th week (second trimester). Some women can have nausea and vomiting for a longer time. These symptoms can make it hard for you to do your daily activities. DISCHARGE INSTRUCTIONS:   Return to the emergency department if:   You are dizzy, cold, and thirsty, and your eyes and mouth are dry. You are urinating very little or not at all. You are dizzy or lightheaded when you stand up. You see blood or material that looks like coffee grounds in your vomit. Call your doctor if:   You vomit more than 4 times in 1 day. You have not been able to keep liquids down for more than 1 day. You lose more than 2 pounds. You have a fever. Your nausea and vomiting continue longer than 14 weeks. You have questions or concerns about your condition or care. Nutrition changes you can make to manage nausea and vomiting:   Eat smaller meals, more often. Eat a small snack, such as crackers, dry cereal, or a small sandwich before you go to bed.     Eat some crackers or dry toast before you get out of bed in the morning. Get out of bed slowly. Sudden movements could cause you to get dizzy and nauseated. Eat bland foods when you feel nauseated. Examples of bland foods include dry toast, dry cereal, plain pasta, white rice, and bread. Other bland foods include saltine crackers, bananas, gelatin, and pretzels. Avoid spicy, greasy, and fried foods. Drink liquids that contain ginger. Drink ginger ale made with real ginger or ginger tea made with fresh grated ginger. Ginger capsules or ginger candies may also help to decrease nausea and vomiting. Drink liquids between meals instead of with meals. Wait at least 30 minutes after you eat to drink liquids. Drink small amounts of liquids often throughout the day to prevent dehydration. Ask how much liquid you should drink each day. Other changes you can make to manage nausea and vomiting:   Avoid smells that bother you. Strong odors may cause nausea and vomiting to start, or make it worse. Do not brush your teeth right after you eat  if it makes you nauseated. Rest when you need to. Start activity slowly and work up to your usual routine as you start to feel better. Talk to your healthcare provider about your prenatal vitamins. Prenatal vitamins can cause nausea for some women. Try taking your prenatal vitamin at night or with a snack. If this change does not help, your healthcare provider may recommend a different type of vitamin. Light to moderate exercise  may help to decrease your symptoms. It may also help you to sleep better at night. Ask your healthcare provider about the best exercise plan for you. Follow up with your doctor as directed:  Write down your questions so you remember to ask them during your visits. © Copyright [employer] 2023 Information is for End User's use only and may not be sold, redistributed or otherwise used for commercial purposes.   The above information is an educational aid only. It is not intended as medical advice for individual conditions or treatments. Talk to your doctor, nurse or pharmacist before following any medical regimen to see if it is safe and effective for you. Jeannine Sellers MD        "This note has been constructed using a voice recognition system. Therefore there may be syntax, spelling, and/or grammatical errors. Please call if you have any questions. "    Tobacco Cessation Counseling: Tobacco cessation counseling was provided.  The patient is sincerely urged to quit consumption of tobacco. She is ready to quit tobacco.

## 2023-10-19 NOTE — ASSESSMENT & PLAN NOTE
Continue low-salt diet blood pressure controlled on metoprolol followed by cardiology blood pressure controlled continue metoprolol low-salt diet

## 2023-10-19 NOTE — PATIENT INSTRUCTIONS
Nausea and Vomiting in Pregnancy   WHAT YOU NEED TO KNOW:   Nausea and vomiting can happen any time of day. These symptoms usually start before the 9th week of pregnancy, and end by the 14th week (second trimester). Some women can have nausea and vomiting for a longer time. These symptoms can make it hard for you to do your daily activities. DISCHARGE INSTRUCTIONS:   Return to the emergency department if:   You are dizzy, cold, and thirsty, and your eyes and mouth are dry. You are urinating very little or not at all. You are dizzy or lightheaded when you stand up. You see blood or material that looks like coffee grounds in your vomit. Call your doctor if:   You vomit more than 4 times in 1 day. You have not been able to keep liquids down for more than 1 day. You lose more than 2 pounds. You have a fever. Your nausea and vomiting continue longer than 14 weeks. You have questions or concerns about your condition or care. Nutrition changes you can make to manage nausea and vomiting:   Eat smaller meals, more often. Eat a small snack, such as crackers, dry cereal, or a small sandwich before you go to bed. Eat some crackers or dry toast before you get out of bed in the morning. Get out of bed slowly. Sudden movements could cause you to get dizzy and nauseated. Eat bland foods when you feel nauseated. Examples of bland foods include dry toast, dry cereal, plain pasta, white rice, and bread. Other bland foods include saltine crackers, bananas, gelatin, and pretzels. Avoid spicy, greasy, and fried foods. Drink liquids that contain ginger. Drink ginger ale made with real ginger or ginger tea made with fresh grated ginger. Ginger capsules or ginger candies may also help to decrease nausea and vomiting. Drink liquids between meals instead of with meals. Wait at least 30 minutes after you eat to drink liquids.  Drink small amounts of liquids often throughout the day to prevent dehydration. Ask how much liquid you should drink each day. Other changes you can make to manage nausea and vomiting:   Avoid smells that bother you. Strong odors may cause nausea and vomiting to start, or make it worse. Do not brush your teeth right after you eat  if it makes you nauseated. Rest when you need to. Start activity slowly and work up to your usual routine as you start to feel better. Talk to your healthcare provider about your prenatal vitamins. Prenatal vitamins can cause nausea for some women. Try taking your prenatal vitamin at night or with a snack. If this change does not help, your healthcare provider may recommend a different type of vitamin. Light to moderate exercise  may help to decrease your symptoms. It may also help you to sleep better at night. Ask your healthcare provider about the best exercise plan for you. Follow up with your doctor as directed:  Write down your questions so you remember to ask them during your visits. © Copyright [facility] 2023 Information is for End User's use only and may not be sold, redistributed or otherwise used for commercial purposes. The above information is an  only. It is not intended as medical advice for individual conditions or treatments. Talk to your doctor, nurse or pharmacist before following any medical regimen to see if it is safe and effective for you.

## 2023-10-19 NOTE — ASSESSMENT & PLAN NOTE
Patient having nausea especially in the morning for last 5 days treated with Zofran ODT and liquid diet and also start Pepcid 20 mg daily suspected GERD induced and or viral gastritis

## 2023-10-19 NOTE — ASSESSMENT & PLAN NOTE
Instructed about the diet symptoms seems to be controlled patient followed by GI to use Lomotil as needed for diarrhea

## 2023-10-19 NOTE — ASSESSMENT & PLAN NOTE
Malnutrition Findings: Patient's BMI 17.25 advised to gain weight dietary instruction given high-calorie high-protein started Remeron 7.5 daily                                BMI Findings:   Advised to increase calorie protein intake to gain weight instructed nutritionist consult counseling        Body mass index is 17.22 kg/m².

## 2023-10-19 NOTE — ASSESSMENT & PLAN NOTE
Came in complaining of intermittent burping and reflux symptoms heartburn with nausea patient had used Pepcid in the past was helping stop taking for last 6 months restart Pepcid 20 mg daily instructed about the diet and if needed follow-up with the GI

## 2023-10-20 ENCOUNTER — TELEPHONE (OUTPATIENT)
Age: 73
End: 2023-10-20

## 2023-10-20 RX ORDER — ONDANSETRON 4 MG/1
4 TABLET, FILM COATED ORAL EVERY 8 HOURS PRN
Qty: 20 TABLET | Refills: 0 | Status: SHIPPED | OUTPATIENT
Start: 2023-10-20

## 2023-10-20 NOTE — TELEPHONE ENCOUNTER
Ondansetron (ZOFRAN-ODT) 4 mg PA Submitted with ov notes via [employer]   Case ID: [id number]

## 2023-10-24 DIAGNOSIS — R11.0 NAUSEA: ICD-10-CM

## 2023-10-24 RX ORDER — ONDANSETRON 4 MG/1
4 TABLET, FILM COATED ORAL EVERY 8 HOURS PRN
Qty: 20 TABLET | Refills: 1 | Status: SHIPPED | OUTPATIENT
Start: 2023-10-24

## 2023-10-24 NOTE — TELEPHONE ENCOUNTER
Pt called in stating this medication dissolves on top of the tongue and it makes her sick to her stomach. She's requesting the tablets to swallow since she's had them before. Same pharmacy.

## 2024-07-18 ENCOUNTER — RA CDI HCC (OUTPATIENT)
Dept: OTHER | Facility: HOSPITAL | Age: 74
End: 2024-07-18

## 2024-07-18 PROBLEM — Z86.16 PERSONAL HISTORY OF COVID-19: Status: ACTIVE | Noted: 2024-07-18

## 2024-07-18 PROBLEM — Z86.16 PERSONAL HISTORY OF COVID-19: Status: ACTIVE | Noted: 2023-01-11

## 2024-07-22 ENCOUNTER — OFFICE VISIT (OUTPATIENT)
Dept: INTERNAL MEDICINE CLINIC | Facility: CLINIC | Age: 74
End: 2024-07-22
Payer: COMMERCIAL

## 2024-07-22 VITALS
WEIGHT: 99.4 LBS | OXYGEN SATURATION: 98 % | HEART RATE: 60 BPM | SYSTOLIC BLOOD PRESSURE: 108 MMHG | DIASTOLIC BLOOD PRESSURE: 62 MMHG | HEIGHT: 63 IN | BODY MASS INDEX: 17.61 KG/M2

## 2024-07-22 DIAGNOSIS — I10 PRIMARY HYPERTENSION: Primary | ICD-10-CM

## 2024-07-22 DIAGNOSIS — M54.16 LUMBAR RADICULOPATHY: ICD-10-CM

## 2024-07-22 DIAGNOSIS — E44.1 MILD PROTEIN-CALORIE MALNUTRITION (HCC): ICD-10-CM

## 2024-07-22 DIAGNOSIS — F11.20 CONTINUOUS OPIOID DEPENDENCE (HCC): ICD-10-CM

## 2024-07-22 DIAGNOSIS — M54.12 CERVICAL RADICULOPATHY: ICD-10-CM

## 2024-07-22 DIAGNOSIS — Z91.81 HISTORY OF FALL: ICD-10-CM

## 2024-07-22 DIAGNOSIS — K21.9 GASTROESOPHAGEAL REFLUX DISEASE WITHOUT ESOPHAGITIS: ICD-10-CM

## 2024-07-22 PROCEDURE — G0439 PPPS, SUBSEQ VISIT: HCPCS | Performed by: STUDENT IN AN ORGANIZED HEALTH CARE EDUCATION/TRAINING PROGRAM

## 2024-07-22 PROCEDURE — 99214 OFFICE O/P EST MOD 30 MIN: CPT | Performed by: STUDENT IN AN ORGANIZED HEALTH CARE EDUCATION/TRAINING PROGRAM

## 2024-07-22 NOTE — ASSESSMENT & PLAN NOTE
Stable  Reports improvement in appetite  Refuse referral to nutritionist  Continue to monitor                                BMI Findings:           Body mass index is 17.61 kg/m².

## 2024-07-22 NOTE — ASSESSMENT & PLAN NOTE
Reports single episode a year ago  Denies any loss of balance or syncopal episodes  To physical therapy

## 2024-07-22 NOTE — ASSESSMENT & PLAN NOTE
On Tylenol with codeine No. 4 for pain management  To physical therapy  Continue follow-up with pain management

## 2024-07-22 NOTE — PROGRESS NOTES
Ambulatory Visit  Name: Sherrie Tovar      : 1950      MRN: [id number]  Encounter Provider: Franco Diallo MD  Encounter Date: 2024   Encounter department: [facility] INTERNAL MEDICINE    Assessment & Plan   1. Primary hypertension  Assessment & Plan:  Blood pressure within acceptable range  Continue current medication regimen and low-salt diet  Monitor  2. Gastroesophageal reflux disease without esophagitis  Assessment & Plan:  Stable  Asymptomatic  On famotidine 20 mg daily  3. Lumbar radiculopathy  Assessment & Plan:  On Tylenol with codeine No. 4 for pain management  To physical therapy  Continue follow-up with pain management  Orders:  -     Ambulatory referral to Physical Therapy; Future  4. Cervical radiculopathy  Assessment & Plan:  On Tylenol with codeine No. 4 for pain management  To physical therapy  Continue follow-up with pain management  Orders:  -     Ambulatory referral to Physical Therapy; Future  5. Mild protein-calorie malnutrition (HCC)  Assessment & Plan:  Stable  Reports improvement in appetite  Refuse referral to nutritionist  Continue to monitor                                BMI Findings:           Body mass index is 17.61 kg/m².     6. History of fall  Assessment & Plan:  Reports single episode a year ago  Denies any loss of balance or syncopal episodes  To physical therapy  Orders:  -     Ambulatory referral to Physical Therapy; Future  7. Continuous opioid dependence (HCC)  Assessment & Plan:  On Tylenol with codeine No. 4 for pain management  Continue follow-up with pain management      Depression Screening and Follow-up Plan: Patient was screened for depression during today's encounter. They screened negative with a PHQ-9 score of 3.      Preventive health issues were discussed with patient, and age appropriate screening tests were ordered as noted in patient's After Visit Summary. Personalized health advice and appropriate referrals for health  "education or preventive services given if needed, as noted in patient's After Visit Summary.    History of Present Illness     Pt is here for avw. Reports h/o chronic neck and back pain s/p \"multiple surgeries\", on tylenol w/codeine prescribed by pain mgmt Dr. Story, f/u every 6 weeks. Reports PT in the past helped with the pain. Did not get PT since 2/2024.  Requesting referral for physical therapy.  Reports she follows up with Cardiology who perform blood work and \"other tests\". Will request records.  Reports h/o fall last year when she fell off of the chair while cleaning in the kitchen. Did not lose balance or consciousness.  Denies any other fall episodes.  Denies any other complaints at this time.  Today, denies any fever chills headache dizziness, chest pain, shortness of breath, abdominal pain, nausea/vomiting, diarrhea/constipation, any numbness or weakness.     Reports smoking 3 cig/day.  Refuse lung cancer screening.       Patient Care Team:  Devon Rodriguez MD as PCP - General (Internal Medicine)  Devon Rodriguez MD as PCP - PCP-[facility] ([facility])    Review of Systems   Constitutional:  Negative for chills and fever.   HENT:  Negative for ear pain and sore throat.    Eyes:  Negative for pain and visual disturbance.   Respiratory:  Negative for cough and shortness of breath.    Cardiovascular:  Negative for chest pain and palpitations.   Gastrointestinal:  Negative for abdominal pain and vomiting.   Genitourinary:  Negative for dysuria and hematuria.   Musculoskeletal:  Positive for arthralgias and back pain.   Skin:  Negative for rash.   Neurological:  Negative for seizures and syncope.   All other systems reviewed and are negative.    Medical History Reviewed by provider this encounter:       Annual Wellness Visit Questionnaire   Sherrie is here for her Subsequent Wellness visit.     Health Risk Assessment:   Patient rates overall health as good. Patient feels that their physical health " rating is slightly worse. Patient is very satisfied with their life. Eyesight was rated as slightly worse. Hearing was rated as same. Patient feels that their emotional and mental health rating is slightly better. Patients states they are never, rarely angry. Patient states they are often unusually tired/fatigued. Pain experienced in the last 7 days has been a lot. Patient's pain rating has been 8/10. Patient states that she has experienced no weight loss or gain in last 6 months. Because of the pain.  Needs glasses.    Depression Screening:   PHQ-9 Score: 3      Fall Risk Screening:   In the past year, patient has experienced: history of falling in past year    Number of falls: 1  Injured during fall?: No    Feels unsteady when standing or walking?: No    Worried about falling?: No      Urinary Incontinence Screening:   Patient has leaked urine accidently in the last six months.     Home Safety:  Patient has trouble with stairs inside or outside of their home. Patient has working smoke alarms and has working carbon monoxide detector. Home safety hazards include: none. Because of the pain has to go slowly    Nutrition:   Gluten free diet. GERD has to watch what she eats.    Medications:   Patient is currently taking over-the-counter supplements. OTC medications include: see medication list. Patient is able to manage medications.     Activities of Daily Living (ADLs)/Instrumental Activities of Daily Living (IADLs):   Walk and transfer into and out of bed and chair?: Yes  Dress and groom yourself?: Yes    Bathe or shower yourself?: Yes    Feed yourself? Yes  Do your laundry/housekeeping?: Yes  Manage your money, pay your bills and track your expenses?: Yes  Make your own meals?: Yes    Do your own shopping?: Yes    Previous Hospitalizations:   Any hospitalizations or ED visits within the last 12 months?: No    How many hospitalizations have you had in the last year?: 1-2    Advance Care Planning:   Living will: Yes     Durable POA for healthcare: Yes    Advanced directive: Yes    Advanced directive counseling given: No    Five wishes given: No    Patient declined ACP directive: No    End of Life Decisions reviewed with patient: Yes    Provider agrees with end of life decisions: Yes      Cognitive Screening:   Provider or family/friend/caregiver concerned regarding cognition?: No    PREVENTIVE SCREENINGS      Cardiovascular Screening:    General: Screening Current    Due for: Lipid Panel      Diabetes Screening:     General: Screening Current      Colorectal Cancer Screening:     General: Patient Declines      Cervical Cancer Screening:    General: Screening Not Indicated      Osteoporosis Screening:    General: Patient Declines      Lung Cancer Screening:     General: Screening Not Indicated      Hepatitis C Screening:    General: Patient Declines    Screening, Brief Intervention, and Referral to Treatment (SBIRT)    Screening      AUDIT-C Screenin) How often did you have a drink containing alcohol in the past year? never  2) How many drinks did you have on a typical day when you were drinking in the past year? 0  3) How often did you have 6 or more drinks on one occasion in the past year? never    AUDIT-C Score: 0  Interpretation: Score 0-2 (female): Negative screen for alcohol misuse    Single Item Drug Screening:  How often have you used an illegal drug (including marijuana) or a prescription medication for non-medical reasons in the past year? never    Single Item Drug Screen Score: 0  Interpretation: Negative screen for possible drug use disorder    Other Counseling Topics:   Skin self-exam and calcium and vitamin D intake.     Social Determinants of Health     Financial Resource Strain: Low Risk  (3/7/2023)    Overall Financial Resource Strain (CARDIA)     Difficulty of Paying Living Expenses: Not very hard   Food Insecurity: No Food Insecurity (2024)    Hunger Vital Sign     Worried About Running Out of Food in  "the Last Year: Never true     Ran Out of Food in the Last Year: Never true   Transportation Needs: No Transportation Needs (7/22/2024)    PRAPARE - Transportation     Lack of Transportation (Medical): No     Lack of Transportation (Non-Medical): No   Housing Stability: Low Risk  (7/22/2024)    Housing Stability Vital Sign     Unable to Pay for Housing in the Last Year: No     Number of Times Moved in the Last Year: 1     Homeless in the Last Year: No   Utilities: Not At Risk (7/22/2024)    [facility] Utilities     Threatened with loss of utilities: No     No results found.    Objective     /62   Pulse 60   Ht 5' 3\" (1.6 m)   Wt 45.1 kg (99 lb 6.4 oz)   SpO2 98%   BMI 17.61 kg/m²     Physical Exam  Vitals reviewed.   Constitutional:       General: She is not in acute distress.     Appearance: She is well-developed.   HENT:      Head: Normocephalic and atraumatic.      Nose: No congestion.      Mouth/Throat:      Mouth: Mucous membranes are moist.      Pharynx: Oropharynx is clear. No oropharyngeal exudate.   Eyes:      Conjunctiva/sclera: Conjunctivae normal.      Pupils: Pupils are equal, round, and reactive to light.   Cardiovascular:      Rate and Rhythm: Normal rate and regular rhythm.   Pulmonary:      Effort: Pulmonary effort is normal. No respiratory distress.      Breath sounds: Normal breath sounds. No wheezing.   Abdominal:      Palpations: Abdomen is soft.      Tenderness: There is no abdominal tenderness.   Musculoskeletal:      Cervical back: Neck supple.      Right lower leg: No edema.      Left lower leg: No edema.      Comments: ROM lumbar spine and cervical spine mild to moderately restricted due to pain.   Skin:     General: Skin is warm and dry.   Neurological:      General: No focal deficit present.      Mental Status: She is alert and oriented to person, place, and time.      Gait: Gait normal.   Psychiatric:         Mood and Affect: Mood normal.           "

## 2024-07-22 NOTE — ASSESSMENT & PLAN NOTE
Blood pressure within acceptable range  Continue current medication regimen and low-salt diet  Monitor

## 2024-07-22 NOTE — PATIENT INSTRUCTIONS
Medicare Preventive Visit Patient Instructions  Thank you for completing your Welcome to Medicare Visit or Medicare Annual Wellness Visit today. Your next wellness visit will be due in one year (7/23/2025).  The screening/preventive services that you may require over the next 5-10 years are detailed below. Some tests may not apply to you based off risk factors and/or age. Screening tests ordered at today's visit but not completed yet may show as past due. Also, please note that scanned in results may not display below.  Preventive Screenings:  Service Recommendations Previous Testing/Comments   Colorectal Cancer Screening  * Colonoscopy    * Fecal Occult Blood Test (FOBT)/Fecal Immunochemical Test (FIT)  * Fecal DNA/Cologuard Test  * Flexible Sigmoidoscopy Age: 45-75 years old   Colonoscopy: every 10 years (may be performed more frequently if at higher risk)  OR  FOBT/FIT: every 1 year  OR  Cologuard: every 3 years  OR  Sigmoidoscopy: every 5 years  Screening may be recommended earlier than age 45 if at higher risk for colorectal cancer. Also, an individualized decision between you and your healthcare provider will decide whether screening between the ages of 76-85 would be appropriate. Colonoscopy: Not on file  FOBT/FIT: Not on file  Cologuard: Not on file  Sigmoidoscopy: Not on file          Breast Cancer Screening Age: 40+ years old  Frequency: every 1-2 years  Not required if history of left and right mastectomy Mammogram: Not on file        Cervical Cancer Screening Between the ages of 21-29, pap smear recommended once every 3 years.   Between the ages of 30-65, can perform pap smear with HPV co-testing every 5 years.   Recommendations may differ for women with a history of total hysterectomy, cervical cancer, or abnormal pap smears in past. Pap Smear: Not on file        Hepatitis C Screening Once for adults born between 1945 and 1965  More frequently in patients at high risk for Hepatitis C Hep C Antibody: Not  on file        Diabetes Screening 1-2 times per year if you're at risk for diabetes or have pre-diabetes Fasting glucose: 93 mg/dL (3/23/2023)  A1C: 5.5 % (3/23/2023)      Cholesterol Screening Once every 5 years if you don't have a lipid disorder. May order more often based on risk factors. Lipid panel: 03/23/2023          Other Preventive Screenings Covered by Medicare:  Abdominal Aortic Aneurysm (AAA) Screening: covered once if your at risk. You're considered to be at risk if you have a family history of AAA.  Lung Cancer Screening: covers low dose CT scan once per year if you meet all of the following conditions: (1) Age 55-77; (2) No signs or symptoms of lung cancer; (3) Current smoker or have quit smoking within the last 15 years; (4) You have a tobacco smoking history of at least 20 pack years (packs per day multiplied by number of years you smoked); (5) You get a written order from a healthcare provider.  Glaucoma Screening: covered annually if you're considered high risk: (1) You have diabetes OR (2) Family history of glaucoma OR (3)  aged 50 and older OR (4)  American aged 65 and older  Osteoporosis Screening: covered every 2 years if you meet one of the following conditions: (1) You're estrogen deficient and at risk for osteoporosis based off medical history and other findings; (2) Have a vertebral abnormality; (3) On glucocorticoid therapy for more than 3 months; (4) Have primary hyperparathyroidism; (5) On osteoporosis medications and need to assess response to drug therapy.   Last bone density test (DXA Scan): Not on file.  HIV Screening: covered annually if you're between the age of 15-65. Also covered annually if you are younger than 15 and older than 65 with risk factors for HIV infection. For pregnant patients, it is covered up to 3 times per pregnancy.    Immunizations:  Immunization Recommendations   Influenza Vaccine Annual influenza vaccination during flu season is  recommended for all persons aged >= 6 months who do not have contraindications   Pneumococcal Vaccine   * Pneumococcal conjugate vaccine = PCV13 (Prevnar 13), PCV15 (Vaxneuvance), PCV20 (Prevnar 20)  * Pneumococcal polysaccharide vaccine = PPSV23 (Pneumovax) Adults 19-63 yo with certain risk factors or if 65+ yo  If never received any pneumonia vaccine: recommend Prevnar 20 (PCV20)  Give PCV20 if previously received 1 dose of PCV13 or PPSV23   Hepatitis B Vaccine 3 dose series if at intermediate or high risk (ex: diabetes, end stage renal disease, liver disease)   Respiratory syncytial virus (RSV) Vaccine - COVERED BY MEDICARE PART D  * RSVPreF3 (Arexvy) CDC recommends that adults 60 years of age and older may receive a single dose of RSV vaccine using shared clinical decision-making (SCDM)   Tetanus (Td) Vaccine - COST NOT COVERED BY MEDICARE PART B Following completion of primary series, a booster dose should be given every 10 years to maintain immunity against tetanus. Td may also be given as tetanus wound prophylaxis.   Tdap Vaccine - COST NOT COVERED BY MEDICARE PART B Recommended at least once for all adults. For pregnant patients, recommended with each pregnancy.   Shingles Vaccine (Shingrix) - COST NOT COVERED BY MEDICARE PART B  2 shot series recommended in those 19 years and older who have or will have weakened immune systems or those 50 years and older     Health Maintenance Due:      Topic Date Due   • Hepatitis C Screening  Never done   • Colorectal Cancer Screening  Never done     Immunizations Due:      Topic Date Due   • Pneumococcal Vaccine: 65+ Years (1 of 2 - PCV) Never done   • COVID-19 Vaccine (1 - 2023-24 season) Never done   • Influenza Vaccine (1) 09/01/2024     Advance Directives   What are advance directives?  Advance directives are legal documents that state your wishes and plans for medical care. These plans are made ahead of time in case you lose your ability to make decisions for  yourself. Advance directives can apply to any medical decision, such as the treatments you want, and if you want to donate organs.   What are the types of advance directives?  There are many types of advance directives, and each state has rules about how to use them. You may choose a combination of any of the following:  Living will:  This is a written record of the treatment you want. You can also choose which treatments you do not want, which to limit, and which to stop at a certain time. This includes surgery, medicine, IV fluid, and tube feedings.   Durable power of  for healthcare (DPAHC):  This is a written record that states who you want to make healthcare choices for you when you are unable to make them for yourself. This person, called a proxy, is usually a family member or a friend. You may choose more than 1 proxy.  Do not resuscitate (DNR) order:  A DNR order is used in case your heart stops beating or you stop breathing. It is a request not to have certain forms of treatment, such as CPR. A DNR order may be included in other types of advance directives.  Medical directive:  This covers the care that you want if you are in a coma, near death, or unable to make decisions for yourself. You can list the treatments you want for each condition. Treatment may include pain medicine, surgery, blood transfusions, dialysis, IV or tube feedings, and a ventilator (breathing machine).  Values history:  This document has questions about your views, beliefs, and how you feel and think about life. This information can help others choose the care that you would choose.  Why are advance directives important?  An advance directive helps you control your care. Although spoken wishes may be used, it is better to have your wishes written down. Spoken wishes can be misunderstood, or not followed. Treatments may be given even if you do not want them. An advance directive may make it easier for your family to make  difficult choices about your care.   Urinary Incontinence   Urinary incontinence (UI)  is when you lose control of your bladder. UI develops because your bladder cannot store or empty urine properly. The 3 most common types of UI are stress incontinence, urge incontinence, or both.  Medicines:   May be given to help strengthen your bladder control. Report any side effects of medication to your healthcare provider.  Do pelvic muscle exercises often:  Your pelvic muscles help you stop urinating. Squeeze these muscles tight for 5 seconds, then relax for 5 seconds. Gradually work up to squeezing for 10 seconds. Do 3 sets of 15 repetitions a day, or as directed. This will help strengthen your pelvic muscles and improve bladder control.  Train your bladder:  Go to the bathroom at set times, such as every 2 hours, even if you do not feel the urge to go. You can also try to hold your urine when you feel the urge to go. For example, hold your urine for 5 minutes when you feel the urge to go. As that becomes easier, hold your urine for 10 minutes.   Self-care:   Keep a UI record.  Write down how often you leak urine and how much you leak. Make a note of what you were doing when you leaked urine.  Drink liquids as directed. You may need to limit the amount of liquid you drink to help control your urine leakage. Do not drink any liquid right before you go to bed. Limit or do not have drinks that contain caffeine or alcohol.   Prevent constipation.  Eat a variety of high-fiber foods. Good examples are high-fiber cereals, beans, vegetables, and whole-grain breads. Walking is the best way to trigger your intestines to have a bowel movement.  Exercise regularly and maintain a healthy weight.  Weight loss and exercise will decrease pressure on your bladder and help you control your leakage.   Use a catheter as directed  to help empty your bladder. A catheter is a tiny, plastic tube that is put into your bladder to drain your urine.    Go to behavior therapy as directed.  Behavior therapy may be used to help you learn to control your urge to urinate.    Cigarette Smoking and Your Health   Risks to your health if you smoke:  Nicotine and other chemicals found in tobacco damage every cell in your body. Even if you are a light smoker, you have an increased risk for cancer, heart disease, and lung disease. If you are pregnant or have diabetes, smoking increases your risk for complications.   Benefits to your health if you stop smoking:   You decrease respiratory symptoms such as coughing, wheezing, and shortness of breath.   You reduce your risk for cancers of the lung, mouth, throat, kidney, bladder, pancreas, stomach, and cervix. If you already have cancer, you increase the benefits of chemotherapy. You also reduce your risk for cancer returning or a second cancer from developing.   You reduce your risk for heart disease, blood clots, heart attack, and stroke.   You reduce your risk for lung infections, and diseases such as pneumonia, asthma, chronic bronchitis, and emphysema.  Your circulation improves. More oxygen can be delivered to your body. If you have diabetes, you lower your risk for complications, such as kidney, artery, and eye diseases. You also lower your risk for nerve damage. Nerve damage can lead to amputations, poor vision, and blindness.  You improve your body's ability to heal and to fight infections.  For more information and support to stop smoking:   [employer]  Phone: 0- 473 - 384-8522  Web Address: www.[employer]  Underweight  Underweight is defined as having a body mass index (BMI) of less than 18.5 kg/m2   Anorexia  is a loss of appetite, decreased food intake, or both. Your appetite naturally decreases as you get older. You also get full faster than you used to. This occurs because your body needs less energy. Other body changes can also lead to a decreased appetite. Even though some appetite loss is normal, you  still need to get enough calories and nutrients to keep you healthy. You can start to lose too much weight if you do not eat as much food as your body needs. Unwanted weight loss can cause health problems, or worsen health problems you already have. You can also become dehydrated if you do not drink enough liquid.  How to eat healthy and get enough nutrients:   Choose healthy foods.  Eat a variety of fruits, vegetables, whole grains, low-fat dairy foods, lean meats, and other protein foods. Limit foods high in fat, sugar, and salt. Limit or avoid alcohol as directed. Work with a dietitian to help you plan your meals if you need to follow a special diet. A dietitian can also teach you how to modify foods if you have trouble chewing or swallowing.   Snack on healthy foods between meals  if you only eat a small amount during meals. Snacks provide extra healthy nutrients and calories between meals. Examples include fruit, cheese, and whole grain crackers.   Drink liquids as directed  to avoid dehydration. Drink liquids between meals if they cause you to get full too quickly during meals. Ask how much liquid to drink each day and which liquids are best for you.   Use herbs, spices, and flavor enhancers to add flavor to foods.  Avoid using herbs and spice blends that also contain sodium. Ask your healthcare provider or dietitian about flavor enhancers. Flavor enhancers with ham, natural manzanares, and roast beef flavors can also be sprinkled on food to add flavor.   Share meals with others as often as you can.  Eating with others may help you to eat better during meal time. Ask family members, neighbors, or friends to join you for lunch. There are also senior centers where you can meet people, and share meals with them.   Ask family and friends for help  with shopping or preparing foods. Ask for a ride to the grocery store, if needed.       © Copyright [employer] 2018 Information is for End User's use only and may not be  sold, redistributed or otherwise used for commercial purposes. All illustrations and images included in CareNotes® are the copyrighted property of [employer] or [employer]

## 2024-09-23 ENCOUNTER — OFFICE VISIT (OUTPATIENT)
Dept: INTERNAL MEDICINE CLINIC | Facility: CLINIC | Age: 74
End: 2024-09-23
Payer: COMMERCIAL

## 2024-09-23 VITALS
DIASTOLIC BLOOD PRESSURE: 68 MMHG | BODY MASS INDEX: 17.36 KG/M2 | WEIGHT: 98 LBS | OXYGEN SATURATION: 98 % | SYSTOLIC BLOOD PRESSURE: 102 MMHG | HEIGHT: 63 IN | HEART RATE: 85 BPM

## 2024-09-23 DIAGNOSIS — I77.1 SUBCLAVIAN ARTERY STENOSIS (HCC): ICD-10-CM

## 2024-09-23 DIAGNOSIS — H93.13 TINNITUS OF BOTH EARS: ICD-10-CM

## 2024-09-23 DIAGNOSIS — J41.0 SIMPLE CHRONIC BRONCHITIS (HCC): ICD-10-CM

## 2024-09-23 DIAGNOSIS — R42 VERTIGO: Primary | ICD-10-CM

## 2024-09-23 PROCEDURE — 99213 OFFICE O/P EST LOW 20 MIN: CPT | Performed by: INTERNAL MEDICINE

## 2024-09-23 PROCEDURE — G2211 COMPLEX E/M VISIT ADD ON: HCPCS | Performed by: INTERNAL MEDICINE

## 2024-09-23 NOTE — PROGRESS NOTES
Dr. Rodriguez's Office Visit Note  24     Sherrie Tovar 74 y.o. female MRN: [id number]  : 1950    Assessment:     1. Lumbar radiculopathy  2. Simple chronic bronchitis (HCC)  Assessment & Plan:  Some dyspnea on exertion at baseline denies any difficulty breathing wheezing at present time and patient refusing any intervention will monitor closely  3. Subclavian artery stenosis (HCC)  4. Vertigo  Assessment & Plan:  Intermittently feeling dizzy and looks like the everything is spinning around with some nausea    The symptom has been improved with meclizine 12.5 mg 3 times a day as needed    Undergoing physical therapy at [facility]    Agree and continue manage medication as follows    Meclizine 12.5 mg 3 times a day as needed    Continue physical therapy at [facility]  5. Tinnitus of both ears  Assessment & Plan:  Tinnitus bilateral right worse than the left denies any symptoms related to ear nose throat no fever chills coughing congestion this is going on for last more than 2 years off and on slowly getting worse    Recommended to be evaluated by ENT patient refused        Discussion Summary and Plan:  Today's care plan and medications were reviewed with patient in detail and all their questions answered to their satisfaction.    Chief Complaint   Patient presents with    Fatigue     Very weak and light headed. Constant ringing in th right ear. Vertigo.  Collasped bladder gets up every few hours to go to the bathroom. Not sleeping .      Subjective:  Chief Complaint  Patient presents with  · Fatigue    Very weak and light headed. Constant ringing in th right ear. Vertigo.  Collasped bladder gets up every few hours to go to the bathroom. Not sleeping .    Reviewed patient been refusing any intervention for any of the symptoms that she has been complaining for now including right hip pain lower back pain ringing in the ears dizziness      Fatigue  Associated symptoms include fatigue and myalgias.  Pertinent negatives include no arthralgias, chest pain, chills, congestion, coughing, diaphoresis, fever, headaches, joint swelling, nausea, neck pain, numbness, rash, sore throat, vomiting or weakness.       The following portions of the patient's history were reviewed and updated as appropriate: allergies, current medications, past family history, past medical history, past social history, past surgical history and problem list.    Review of Systems   Constitutional:  Positive for activity change and fatigue. Negative for appetite change, chills, diaphoresis, fever and unexpected weight change.   HENT:  Negative for congestion, dental problem, drooling, ear discharge, ear pain, facial swelling, hearing loss, mouth sores, nosebleeds, postnasal drip, rhinorrhea, sinus pressure, sneezing, sore throat, tinnitus, trouble swallowing and voice change.    Eyes:  Negative for photophobia, pain, discharge, redness, itching and visual disturbance.   Respiratory:  Negative for apnea, cough, choking, chest tightness, shortness of breath, wheezing and stridor.    Cardiovascular:  Negative for chest pain, palpitations and leg swelling.   Gastrointestinal:  Negative for abdominal distention, anal bleeding, blood in stool, constipation, nausea, rectal pain and vomiting.   Endocrine: Negative for cold intolerance, heat intolerance, polydipsia, polyphagia and polyuria.   Genitourinary:  Negative for decreased urine volume, difficulty urinating, dysuria, enuresis, flank pain, frequency, genital sores, hematuria and urgency.   Musculoskeletal:  Positive for back pain, gait problem and myalgias. Negative for arthralgias, joint swelling, neck pain and neck stiffness.   Skin:  Negative for color change, pallor, rash and wound.   Allergic/Immunologic: Negative.  Negative for environmental allergies, food allergies and immunocompromised state.   Neurological:  Positive for dizziness. Negative for tremors, seizures, syncope, facial  asymmetry, speech difficulty, weakness, light-headedness, numbness and headaches.   Psychiatric/Behavioral:  Negative for agitation, behavioral problems, confusion, decreased concentration, dysphoric mood, hallucinations, self-injury, sleep disturbance and suicidal ideas. The patient is not nervous/anxious and is not hyperactive.          Historical Information   Patient Active Problem List   Diagnosis    Anxiety disorder    Chronic back pain    Chronic obstructive lung disease (HCC)    Depressive disorder    Hypertensive disorder    Irritable bowel syndrome    Lumbar post-laminectomy syndrome    Lumbar radiculopathy    Vertigo    Cervical radiculopathy    Continuous opioid dependence (HCC)    B12 deficiency    Mild protein-calorie malnutrition (HCC)    Acute superficial gastritis without hemorrhage    Subclavian artery stenosis (HCC)    Thyroid nodule    Cysts of both ovaries    History of fall    Acute midline low back pain without sciatica    Rash    Nausea    Gastroesophageal reflux disease without esophagitis    Personal history of COVID-19    Tinnitus of both ears     Past Medical History:   Diagnosis Date    Abdominal pain     Back pain     Chest pain     COPD (chronic obstructive pulmonary disease) (HCC)     Dizziness     Fibromyalgia     GERD (gastroesophageal reflux disease)     Hypertension     Inflammatory bowel disease     Lightheadedness     RVOT ventricular tachycardia (HCC)     SOB (shortness of breath)      Past Surgical History:   Procedure Laterality Date    LUMBAR LAMINECTOMY      SPINE SURGERY       Social History     Substance and Sexual Activity   Alcohol Use No     Social History     Substance and Sexual Activity   Drug Use No     Social History     Tobacco Use   Smoking Status Every Day    Current packs/day: 0.25    Types: Cigarettes   Smokeless Tobacco Never     Family History   Problem Relation Age of Onset    Heart disease Mother      Health Maintenance Due   Topic    Hepatitis C  "Screening     Pneumococcal Vaccine: 65+ Years (1 of 2 - PCV)    Osteoporosis Screening     Zoster Vaccine (1 of 2)    RSV Vaccine Age 60+ Years (1 - 1-dose 60+ series)    COVID-19 Vaccine (1 - 2023-24 season)    Influenza Vaccine (1)      Meds/Allergies       Current Outpatient Medications:     Acetaminophen-Codeine (TYLENOL WITH CODEINE #4 PO), q6h, Disp: , Rfl:     fluticasone (FLONASE) 50 mcg/act nasal spray, 1 spray into each nostril 2 (two) times a day, Disp: , Rfl:     MECLIZINE HCL PO, Take 6.25 mg by mouth as needed., Disp: , Rfl:       Objective:    Vitals:   /68   Pulse 85   Ht 5' 3\" (1.6 m)   Wt 44.5 kg (98 lb)   SpO2 98%   BMI 17.36 kg/m²   Body mass index is 17.36 kg/m².  Vitals:    09/23/24 1244   Weight: 44.5 kg (98 lb)       Physical Exam  Vitals and nursing note reviewed.   Constitutional:       General: She is not in acute distress.     Appearance: She is well-developed. She is not ill-appearing, toxic-appearing or diaphoretic.   HENT:      Head: Normocephalic and atraumatic.      Right Ear: External ear normal.      Left Ear: External ear normal.      Nose: Nose normal.      Mouth/Throat:      Pharynx: No oropharyngeal exudate.   Eyes:      General: Lids are normal. Lids are everted, no foreign bodies appreciated. No scleral icterus.        Right eye: No discharge.         Left eye: No discharge.      Conjunctiva/sclera: Conjunctivae normal.      Pupils: Pupils are equal, round, and reactive to light.   Neck:      Thyroid: No thyromegaly.      Vascular: Normal carotid pulses. No carotid bruit, hepatojugular reflux or JVD.      Trachea: No tracheal tenderness or tracheal deviation.   Cardiovascular:      Rate and Rhythm: Regular rhythm. Tachycardia present.      Pulses: Normal pulses.      Heart sounds: Murmur heard.      No friction rub. No gallop.   Pulmonary:      Effort: Pulmonary effort is normal. No respiratory distress.      Breath sounds: Normal breath sounds. No stridor. No " wheezing or rales.   Chest:      Chest wall: No tenderness.   Abdominal:      General: Bowel sounds are normal. There is no distension.      Palpations: Abdomen is soft. There is no mass.      Tenderness: There is no abdominal tenderness. There is no guarding or rebound.   Musculoskeletal:         General: No tenderness or deformity. Normal range of motion.      Cervical back: Normal range of motion and neck supple. No edema, erythema or rigidity. No spinous process tenderness or muscular tenderness. Normal range of motion.   Lymphadenopathy:      Head:      Right side of head: No submental, submandibular, tonsillar, preauricular or posterior auricular adenopathy.      Left side of head: No submental, submandibular, tonsillar, preauricular, posterior auricular or occipital adenopathy.      Cervical: No cervical adenopathy.      Right cervical: No superficial, deep or posterior cervical adenopathy.     Left cervical: No superficial, deep or posterior cervical adenopathy.      Upper Body:      Right upper body: No pectoral adenopathy.      Left upper body: No pectoral adenopathy.   Skin:     General: Skin is warm and dry.      Coloration: Skin is not pale.      Findings: No erythema or rash.   Neurological:      General: No focal deficit present.      Mental Status: She is alert and oriented to person, place, and time.      Cranial Nerves: No cranial nerve deficit.      Sensory: No sensory deficit.      Motor: No tremor, abnormal muscle tone or seizure activity.      Coordination: Coordination normal.      Gait: Gait normal.      Deep Tendon Reflexes: Reflexes are normal and symmetric. Reflexes normal.   Psychiatric:         Behavior: Behavior normal.         Thought Content: Thought content normal.         Judgment: Judgment normal.         Lab Review   No visits with results within 2 Month(s) from this visit.   Latest known visit with results is:   Appointment on 03/23/2023   Component Date Value Ref Range Status     WBC 03/23/2023 8.18  4.31 - 10.16 Thousand/uL Final    RBC 03/23/2023 4.67  3.81 - 5.12 Million/uL Final    Hemoglobin 03/23/2023 14.7  11.5 - 15.4 g/dL Final    Hematocrit 03/23/2023 46.4 (H)  34.8 - 46.1 % Final    MCV 03/23/2023 99 (H)  82 - 98 fL Final    MCH 03/23/2023 31.5  26.8 - 34.3 pg Final    MCHC 03/23/2023 31.7  31.4 - 37.4 g/dL Final    RDW 03/23/2023 13.0  11.6 - 15.1 % Final    MPV 03/23/2023 10.0  8.9 - 12.7 fL Final    Platelets 03/23/2023 315  149 - 390 Thousands/uL Final    nRBC 03/23/2023 0  /100 WBCs Final    Segmented % 03/23/2023 60  43 - 75 % Final    Immature Grans % 03/23/2023 0  0 - 2 % Final    Lymphocytes % 03/23/2023 31  14 - 44 % Final    Monocytes % 03/23/2023 6  4 - 12 % Final    Eosinophils Relative 03/23/2023 2  0 - 6 % Final    Basophils Relative 03/23/2023 1  0 - 1 % Final    Absolute Neutrophils 03/23/2023 4.89  1.85 - 7.62 Thousands/µL Final    Absolute Immature Grans 03/23/2023 0.02  0.00 - 0.20 Thousand/uL Final    Absolute Lymphocytes 03/23/2023 2.55  0.60 - 4.47 Thousands/µL Final    Absolute Monocytes 03/23/2023 0.47  0.17 - 1.22 Thousand/µL Final    Eosinophils Absolute 03/23/2023 0.20  0.00 - 0.61 Thousand/µL Final    Basophils Absolute 03/23/2023 0.05  0.00 - 0.10 Thousands/µL Final    Sodium 03/23/2023 138  135 - 147 mmol/L Final    Potassium 03/23/2023 4.4  3.5 - 5.3 mmol/L Final    Chloride 03/23/2023 106  96 - 108 mmol/L Final    CO2 03/23/2023 29  21 - 32 mmol/L Final    ANION GAP 03/23/2023 3 (L)  4 - 13 mmol/L Final    BUN 03/23/2023 12  5 - 25 mg/dL Final    Creatinine 03/23/2023 0.54 (L)  0.60 - 1.30 mg/dL Final    Standardized to IDMS reference method    Glucose, Fasting 03/23/2023 93  65 - 99 mg/dL Final    Specimen collection should occur prior to Sulfasalazine administration due to the potential for falsely depressed results. Specimen collection should occur prior to Sulfapyridine administration due to the potential for falsely elevated results.    Calcium  03/23/2023 9.2  8.3 - 10.1 mg/dL Final    AST 03/23/2023 16  5 - 45 U/L Final    Specimen collection should occur prior to Sulfasalazine administration due to the potential for falsely depressed results.     ALT 03/23/2023 22  12 - 78 U/L Final    Specimen collection should occur prior to Sulfasalazine and/or Sulfapyridine administration due to the potential for falsely depressed results.     Alkaline Phosphatase 03/23/2023 58  46 - 116 U/L Final    Total Protein 03/23/2023 6.4  6.4 - 8.4 g/dL Final    Albumin 03/23/2023 3.8  3.5 - 5.0 g/dL Final    Total Bilirubin 03/23/2023 0.91  0.20 - 1.00 mg/dL Final    Use of this assay is not recommended for patients undergoing treatment with eltrombopag due to the potential for falsely elevated results.    eGFR 03/23/2023 93  ml/min/1.73sq m Final    Hemoglobin A1C 03/23/2023 5.5  Normal 3.8-5.6%; PreDiabetic 5.7-6.4%; Diabetic >=6.5%; Glycemic control for adults with diabetes <7.0% % Final    EAG 03/23/2023 111  mg/dl Final    Cholesterol 03/23/2023 202 (H)  See Comment mg/dL Final    Cholesterol:         Pediatric <18 Years        Desirable          <170 mg/dL      Borderline High    170-199 mg/dL      High               >=200 mg/dL        Adult >=18 Years            Desirable         <200 mg/dL      Borderline High   200-239 mg/dL      High              >239 mg/dL      Triglycerides 03/23/2023 101  See Comment mg/dL Final    Triglyceride:     0-9Y            <75mg/dL     10Y-17Y         <90 mg/dL       >=18Y     Normal          <150 mg/dL     Borderline High 150-199 mg/dL     High            200-499 mg/dL        Very High       >499 mg/dL    Specimen collection should occur prior to N-Acetylcysteine or Metamizole administration due to the potential for falsely depressed results.    HDL, Direct 03/23/2023 81  >=50 mg/dL Final    LDL Calculated 03/23/2023 101 (H)  0 - 100 mg/dL Final    This screening LDL is a calculated result.   It does not have the accuracy of the Direct  Measured LDL in the monitoring of patients with hyperlipidemia and/or statin therapy.   Direct Measure LDL (XDB220) must be ordered separately in these patients.  LDL Cholesterol:     Optimal           <100 mg/dl     Near Optimal      100-129 mg/dl     Above Optimal       Borderline High 130-159 mg/dl       High            160-189 mg/dl       Very High       >189 mg/dl           Color, UA 03/23/2023 Light Yellow   Final    Clarity, UA 03/23/2023 Clear   Final    Specific Gravity, UA 03/23/2023 1.015  1.003 - 1.030 Final    pH, UA 03/23/2023 6.0  4.5, 5.0, 5.5, 6.0, 6.5, 7.0, 7.5, 8.0 Final    Leukocytes, UA 03/23/2023 Negative  Negative Final    Nitrite, UA 03/23/2023 Negative  Negative Final    Protein, UA 03/23/2023 Negative  Negative mg/dl Final    Glucose, UA 03/23/2023 Negative  Negative mg/dl Final    Ketones, UA 03/23/2023 Negative  Negative mg/dl Final    Urobilinogen, UA 03/23/2023 <2.0  <2.0 mg/dl mg/dl Final    Bilirubin, UA 03/23/2023 Negative  Negative Final    Occult Blood, UA 03/23/2023 Negative  Negative Final    RBC, UA 03/23/2023 None Seen  None Seen, 1-2 /hpf Final    WBC, UA 03/23/2023 1-2  None Seen, 1-2 /hpf Final    Epithelial Cells 03/23/2023 Occasional  None Seen, Occasional /hpf Final    Bacteria, UA 03/23/2023 None Seen  None Seen, Occasional /hpf Final    Vit D, 25-Hydroxy 03/23/2023 9.9 (L)  30.0 - 100.0 ng/mL Final    Vitamin B-12 03/23/2023 1,923 (H)  100 - 900 pg/mL Final         Patient Instructions   Patient Education     Low back pain - Discharge instructions   The Basics   Written by the doctors and editors at [employer]   What are discharge instructions? -- Discharge instructions are information about how to take care of yourself after getting medical care for a health problem.  What is low back pain? -- Low back pain is pain or discomfort in the lower part of your back. Many people have low back pain at some point, and it most often gets better on its own. Many different things  "can cause low back pain. Most of the time, doctors do not know the exact cause.  Back pain can happen if you strain a muscle. This is often what has happened when a person \"throws out\" their back. This refers to pain that starts suddenly after physical activity, like lifting something heavy or bending the back.  Back pain can also happen if you have (figure 1):   Damaged, bulging, or torn discs   Arthritis affecting the joints of the spine   Bony growths on the vertebrae that crowd nearby nerves   A \"compression fracture\" due to osteoporosis (a condition that makes your bones weak)   A vertebra out of place   Narrowing in the spinal canal   A tumor or infection (but this is very rare)  How do I care for myself at home? -- Ask the doctor or nurse what you should do when you go home. Make sure that you understand exactly what you need to do to care for yourself. Ask questions if there is anything you do not understand.  You should also:   Use heat on your back for short periods of time, if it helps with pain. Put a heating pad (on the low setting) on your back for 20 minutes at a time a few times each day. Never go to sleep with heat on your back.   Try to stay as active as you can without causing too much pain, if your doctor or nurse said that it is OK. If your pain is severe, you might need to rest for a day or 2. But it's important to get back to walking and moving as soon as possible. Try to keep doing your normal daily activities. Get up and move around gently during the day as you are able.   Slowly start to increase your activity level as you are able to. If something causes your pain to come back or get worse, stop and go back to doing easier activities that did not hurt.   Avoid sitting or standing in 1 position for a long time. You might want to sleep with a pillow under or between your knees if this eases your pain.   Take a medicine like ibuprofen (sample brand names: Advil, Motrin) or naproxen (brand " "name: Aleve) for pain, if needed. These are nonsteroidal antiinflammatory drugs (\"NSAIDs\"). They might work better than acetaminophen for low back pain.   Talk to your doctor or nurse before trying any of the following. These treatments might help you feel better for a little while:   Spinal manipulation - This is when a chiropractor, physical therapist, or other professional moves or \"adjusts\" the joints of your back.   Acupuncture - This is when someone who knows traditional Chinese medicine inserts tiny needles through your skin to block pain signals.   Massage therapy - The massage therapist manipulates your muscles and soft tissues to decrease muscle tension and increase relaxation.  What follow-up care do I need? -- Your doctor or nurse will tell you if you need to make a follow-up appointment. If so, make sure that you know when and where to go. The doctor might suggest that you see a physical therapist to learn exercises to help with your back pain.  When should I call the doctor? -- Call for emergency help right away (in the [place] and [place], call 9-1-1) if:   You are unable to walk, or cannot control your bowels or bladder.   You develop a fever of 100.4°F (38°C) or higher, chills, or night sweats.  Call your doctor for advice if:   Your legs are numb, weak, or tingly.   Your pain is getting worse, even with medicines and rest.   You develop a new rash.  All topics are updated as new evidence becomes available and our peer review process is complete.  This topic retrieved from [employer] on: May 15, 2024.  Topic 253153 Version 1.0  Release: 32.4.3 - C32.134  © 2024 [employer], [employer] and/or its affiliates. All rights reserved.  figure 1: Anatomy of the back     This drawing shows the different parts of the back. Back pain can be caused by problems with the muscles, ligaments, discs, bones (vertebrae), or nerves.  Graphic 94974 Version 6.0  Consumer Information Use and Disclaimer   Disclaimer: This generalized " "information is a limited summary of diagnosis, treatment, and/or medication information. It is not meant to be comprehensive and should be used as a tool to help the user understand and/or assess potential diagnostic and treatment options. It does NOT include all information about conditions, treatments, medications, side effects, or risks that may apply to a specific patient. It is not intended to be medical advice or a substitute for the medical advice, diagnosis, or treatment of a health care provider based on the health care provider's examination and assessment of a patient's specific and unique circumstances. Patients must speak with a health care provider for complete information about their health, medical questions, and treatment options, including any risks or benefits regarding use of medications. This information does not endorse any treatments or medications as safe, effective, or approved for treating a specific patient. [employer] and its affiliates disclaim any warranty or liability relating to this information or the use thereof.The use of this information is governed by the Terms of Use, available at https://www.woltersMocoplexuwer.com/en/know/clinical-effectiveness-terms. 2024© [employer], [employer] and its affiliates and/or licensors. All rights reserved.  Copyright   © 2024 [employer], [employer] and/or its affiliates. All rights reserved.       Devon Rodriguez MD        \"This note has been constructed using a voice recognition system.Therefore there may be syntax, spelling, and/or grammatical errors. Please call if you have any questions. \"  "

## 2024-09-23 NOTE — PATIENT INSTRUCTIONS
"Patient Education     Low back pain - Discharge instructions   The Basics   Written by the doctors and editors at [facility]   What are discharge instructions? -- Discharge instructions are information about how to take care of yourself after getting medical care for a health problem.  What is low back pain? -- Low back pain is pain or discomfort in the lower part of your back. Many people have low back pain at some point, and it most often gets better on its own. Many different things can cause low back pain. Most of the time, doctors do not know the exact cause.  Back pain can happen if you strain a muscle. This is often what has happened when a person \"throws out\" their back. This refers to pain that starts suddenly after physical activity, like lifting something heavy or bending the back.  Back pain can also happen if you have (figure 1):   Damaged, bulging, or torn discs   Arthritis affecting the joints of the spine   Bony growths on the vertebrae that crowd nearby nerves   A \"compression fracture\" due to osteoporosis (a condition that makes your bones weak)   A vertebra out of place   Narrowing in the spinal canal   A tumor or infection (but this is very rare)  How do I care for myself at home? -- Ask the doctor or nurse what you should do when you go home. Make sure that you understand exactly what you need to do to care for yourself. Ask questions if there is anything you do not understand.  You should also:   Use heat on your back for short periods of time, if it helps with pain. Put a heating pad (on the low setting) on your back for 20 minutes at a time a few times each day. Never go to sleep with heat on your back.   Try to stay as active as you can without causing too much pain, if your doctor or nurse said that it is OK. If your pain is severe, you might need to rest for a day or 2. But it's important to get back to walking and moving as soon as possible. Try to keep doing your normal daily activities. " "Get up and move around gently during the day as you are able.   Slowly start to increase your activity level as you are able to. If something causes your pain to come back or get worse, stop and go back to doing easier activities that did not hurt.   Avoid sitting or standing in 1 position for a long time. You might want to sleep with a pillow under or between your knees if this eases your pain.   Take a medicine like ibuprofen (sample brand names: Advil, Motrin) or naproxen (brand name: Aleve) for pain, if needed. These are nonsteroidal antiinflammatory drugs (\"NSAIDs\"). They might work better than acetaminophen for low back pain.   Talk to your doctor or nurse before trying any of the following. These treatments might help you feel better for a little while:   Spinal manipulation - This is when a chiropractor, physical therapist, or other professional moves or \"adjusts\" the joints of your back.   Acupuncture - This is when someone who knows traditional Chinese medicine inserts tiny needles through your skin to block pain signals.   Massage therapy - The massage therapist manipulates your muscles and soft tissues to decrease muscle tension and increase relaxation.  What follow-up care do I need? -- Your doctor or nurse will tell you if you need to make a follow-up appointment. If so, make sure that you know when and where to go. The doctor might suggest that you see a physical therapist to learn exercises to help with your back pain.  When should I call the doctor? -- Call for emergency help right away (in the [place] and [place], call 9-1-1) if:   You are unable to walk, or cannot control your bowels or bladder.   You develop a fever of 100.4°F (38°C) or higher, chills, or night sweats.  Call your doctor for advice if:   Your legs are numb, weak, or tingly.   Your pain is getting worse, even with medicines and rest.   You develop a new rash.  All topics are updated as new evidence becomes available and our peer review " process is complete.  This topic retrieved from [employer] on: May 15, 2024.  Topic 389992 Version 1.0  Release: 32.4.3 - C32.134  © 2024 UpToDate, Inc. and/or its affiliates. All rights reserved.  figure 1: Anatomy of the back     This drawing shows the different parts of the back. Back pain can be caused by problems with the muscles, ligaments, discs, bones (vertebrae), or nerves.  Graphic 77396 Version 6.0  Consumer Information Use and Disclaimer   Disclaimer: This generalized information is a limited summary of diagnosis, treatment, and/or medication information. It is not meant to be comprehensive and should be used as a tool to help the user understand and/or assess potential diagnostic and treatment options. It does NOT include all information about conditions, treatments, medications, side effects, or risks that may apply to a specific patient. It is not intended to be medical advice or a substitute for the medical advice, diagnosis, or treatment of a health care provider based on the health care provider's examination and assessment of a patient's specific and unique circumstances. Patients must speak with a health care provider for complete information about their health, medical questions, and treatment options, including any risks or benefits regarding use of medications. This information does not endorse any treatments or medications as safe, effective, or approved for treating a specific patient. UpToDate, Inc. and its affiliates disclaim any warranty or liability relating to this information or the use thereof.The use of this information is governed by the Terms of Use, available at https://www.wolterskluwer.com/en/know/clinical-effectiveness-terms. 2024© UpToDate, Inc. and its affiliates and/or licensors. All rights reserved.  Copyright   © 2024 UpToDate, Inc. and/or its affiliates. All rights reserved.

## 2024-09-25 PROBLEM — H93.13 TINNITUS OF BOTH EARS: Status: ACTIVE | Noted: 2024-09-25

## 2024-09-25 NOTE — ASSESSMENT & PLAN NOTE
Intermittently feeling dizzy and looks like the everything is spinning around with some nausea    The symptom has been improved with meclizine 12.5 mg 3 times a day as needed    Undergoing physical therapy at [facility]    Agree and continue manage medication as follows    Meclizine 12.5 mg 3 times a day as needed    Continue physical therapy at [facility]

## 2024-09-25 NOTE — ASSESSMENT & PLAN NOTE
Tinnitus bilateral right worse than the left denies any symptoms related to ear nose throat no fever chills coughing congestion this is going on for last more than 2 years off and on slowly getting worse    Recommended to be evaluated by ENT patient refused

## 2024-09-25 NOTE — ASSESSMENT & PLAN NOTE
CTA of the head and neck done on August 7, 2022 in the emergency room work-up for dizziness and suspected TIA showed left subclavian artery stenosis 80% asymptomatic recommended to be seen by vascular surgery patient prefers to be seen by cardiology patient is followed by cardiology continue aspirin patient is reluctant to take statin no change stable no recurrence    Patient advised to be seen by cardiology

## 2024-09-25 NOTE — ASSESSMENT & PLAN NOTE
Some dyspnea on exertion at baseline denies any difficulty breathing wheezing at present time and patient refusing any intervention will monitor closely

## 2024-10-11 ENCOUNTER — TELEPHONE (OUTPATIENT)
Age: 74
End: 2024-10-11

## 2024-10-11 DIAGNOSIS — L29.9 ITCHING: Primary | ICD-10-CM

## 2024-10-11 RX ORDER — HYDROXYZINE PAMOATE 25 MG/1
CAPSULE ORAL
Qty: 30 CAPSULE | Refills: 0 | Status: SHIPPED | OUTPATIENT
Start: 2024-10-11

## 2024-10-11 NOTE — TELEPHONE ENCOUNTER
Pt called to request Dr Rodriguez's advice.  Her dog has been diagnosed with sarcoptic mange and the vet told her that it can be spread to humans.  Since then, her arms started itching, which is one of the symptoms.  Offered to schedule appt so Dr Rodriguez can evaluate her arms, but she declined because she said there is nothing to see.  There is no rash or anything, they are just itchy.  She read last night that calamine lotion can help, and she is going to try that, but she was wondering if there was anything Dr Rodriguez could prescribe for the itching.  If yes, she uses [facility] in [place].

## 2024-10-11 NOTE — PROGRESS NOTES
Patient has intractable itching both upper extremities with nighttime prescribed Atarax 25 mg 3 times a day as needed if no improvement should come in for follow-up

## 2024-10-17 ENCOUNTER — OFFICE VISIT (OUTPATIENT)
Dept: INTERNAL MEDICINE CLINIC | Facility: CLINIC | Age: 74
End: 2024-10-17
Payer: COMMERCIAL

## 2024-10-17 VITALS — HEART RATE: 68 BPM | OXYGEN SATURATION: 98 % | HEIGHT: 63 IN | WEIGHT: 101 LBS | BODY MASS INDEX: 17.89 KG/M2

## 2024-10-17 DIAGNOSIS — B86 SCABIES: Primary | ICD-10-CM

## 2024-10-17 DIAGNOSIS — L29.89 PRURITIC ERYTHEMATOUS RASH: ICD-10-CM

## 2024-10-17 PROCEDURE — 99213 OFFICE O/P EST LOW 20 MIN: CPT

## 2024-10-17 PROCEDURE — G2211 COMPLEX E/M VISIT ADD ON: HCPCS

## 2024-10-17 RX ORDER — PERMETHRIN 50 MG/G
CREAM TOPICAL ONCE
Qty: 60 G | Refills: 0 | Status: SHIPPED | OUTPATIENT
Start: 2024-10-17 | End: 2024-10-17

## 2024-10-17 RX ORDER — METOPROLOL TARTRATE 25 MG/1
25 TABLET, FILM COATED ORAL 3 TIMES DAILY
COMMUNITY

## 2024-10-17 NOTE — PATIENT INSTRUCTIONS
"Patient Education     Scabies   The Basics   Written by the doctors and editors at [facility]   What is scabies? -- Scabies is a condition that makes your skin very itchy. It happens when tiny insects called \"mites\" burrow under your skin to lay their eggs.  How did I get scabies? -- Scabies spreads easily between people who are in close contact. This can happen through sexual activity, or just between people who live together. It is not usually spread by quick contact with another person, like a hug or handshake.  Are there symptoms besides itching? -- Yes. The main symptom is itching, but there are other symptoms. People with scabies usually get little bumps or blisters on their skin (picture 1). Sometimes, the bumps are hard to see. Some people even notice tiny tunnels in their skin where the mites have buried themselves.  The body parts most often affected by scabies are the (figure 1):   Fingers and webbing between the fingers   Skin folds around the wrists, elbows, and knees   Armpits   Area around the nipples   Waist   Penis and scrotum   Lower buttocks and upper thighs   Sides and bottoms of the feet  Older adults and people with HIV or AIDS, cancer, or other conditions can get a severe type of scabies called \"crusted scabies.\" Crusted scabies causes large, crusty patches or bumps to form on the skin. It spreads more easily than regular scabies. People with crusted scabies often get it on their head, hands, and feet.  How is scabies treated? -- If you have scabies, your doctor or nurse can prescribe medicine to get rid of it. It's important to treat scabies to prevent spreading it to other people. Also, scratching your skin a lot can lead to infection.  Scabies is treated with medicine that kills the mites:   This might be a cream that you put on your skin, or a pill. People with crusted scabies usually need both.   There are several different medicines that can kill the mites that cause scabies. Permethrin " cream (brand names: Elimite, Acticin) and ivermectin pills (brand name: Stromectol) are 2 medicines that are often used.   For the medicine to work correctly, follow all instructions for how to use it. Ask your doctor or nurse if you have any questions about this.  If you are being treated for scabies, the doctor or nurse will probably want the people who live with you to be treated, too. They might be carrying the mite that causes scabies, even if they have no symptoms.  What can I do to stop the itching? -- You can take medicines called antihistamines. These are the medicines people often take for allergies.  Itching can last for several weeks, even after the scabies mite is gone. Your doctor or nurse might suggest that you use a cream with a medicine called a steroid to help with the itching. These steroid medicines relieve itching and swelling.  Can scabies be prevented? -- If someone in your home has scabies, there are things you can do to prevent others from getting it:   Once the person has started treatment, wash all of the clothes everyone in the home wore in the last 4 to 5 days. Use hot water. Then, dry them in a dryer on high heat.   Wash any bedclothes (sheets and blankets) or towels people in your home have touched.   If a child is getting treated, wash their stuffed animals and soft toys.   Dry cleaning will also get rid of scabies mites. Any bedding, clothing, or towels that you cannot wash or dry clean should be placed in a sealed plastic bag for at least 3 days. Scabies mites usually die without contact with human skin after a few days.  When can I go back to work or school? -- If you have scabies, you can go back to work or school after 1 day of treatment.  When should I call the doctor? -- Call your doctor or nurse if you have:   Signs of infection - These include a fever, chills, or redness, tenderness, or swelling of the skin.   Very bad itching that does not get better with treatment   A new  skin rash   Questions about how to use your medicine  All topics are updated as new evidence becomes available and our peer review process is complete.  This topic retrieved from [employer] on: Feb 28, 2024.  Topic 51123 Version 10.0  Release: 32.2.4 - C32.58  © 2024 UpToDate, Inc. and/or its affiliates. All rights reserved.  picture 1: Scabies between the fingers     Scabies often affects the webbing between the fingers.  Graphic 53997 Version 4.0  figure 1: Parts of the body affected by scabies     Scabies usually affects the sides and webs of the fingers, inside of the wrists, elbows, armpits, skin around the nipples (especially in females), skin around the belly button, waist, male genitals (scrotum and penis), knees, lower half of the butt and tops of thighs, and sides and backs of the feet. The back is not usually affected. The head is not usually affected except in very young children.  Graphic 81422 Version 5.0  Consumer Information Use and Disclaimer   Disclaimer: This generalized information is a limited summary of diagnosis, treatment, and/or medication information. It is not meant to be comprehensive and should be used as a tool to help the user understand and/or assess potential diagnostic and treatment options. It does NOT include all information about conditions, treatments, medications, side effects, or risks that may apply to a specific patient. It is not intended to be medical advice or a substitute for the medical advice, diagnosis, or treatment of a health care provider based on the health care provider's examination and assessment of a patient's specific and unique circumstances. Patients must speak with a health care provider for complete information about their health, medical questions, and treatment options, including any risks or benefits regarding use of medications. This information does not endorse any treatments or medications as safe, effective, or approved for treating a specific  patient. UpToDate, Inc. and its affiliates disclaim any warranty or liability relating to this information or the use thereof.The use of this information is governed by the Terms of Use, available at https://www.woltersAuthoreauwer.com/en/know/clinical-effectiveness-terms. 2024© UpToDate, Inc. and its affiliates and/or licensors. All rights reserved.  Copyright   © 2024 UpToDate, Inc. and/or its affiliates. All rights reserved.

## 2024-10-17 NOTE — PROGRESS NOTES
Ambulatory Visit  Name: Sherrie Tovar      : 1950      MRN: [id number]  Encounter Provider: Renee Wallace MD  Encounter Date: 10/17/2024   Encounter department: [facility]    Assessment & Plan  Scabies  -Given history and appearance of rash, will treat for scabies  -For permethrin cream sent to pharmacy; instructions provided  -Directions provided for decontamination of clothing, bedding, towels, etc.  Orders:    permethrin (ELIMITE) 5 % cream; Apply topically once for 1 dose    Pruritic erythematous rash  -Patient would like to try taking hydroxyzine again, will start taking at bedtime and monitor for symptoms of dizziness and poor coordination  -Continue with calamine lotion  -Recommend Aveeno oatmeal bath  -Declined oral or topical steroids given cardiac history       Return if symptoms worsen or fail to improve.     History of Present Illness     74-year-old female presents with complaint of diffuse pruritic rash that continues to spread along her arms, axilla and back x 2 weeks.  Recent exposure to her dog who was found to have mites around the same time that her symptoms began.  The patient has a history of RVOT and has a limited allowance of medications that she can take.  Following discussion with her cardiologist, she started taking hydroxyzine to help with the itch but developed mild symptoms of dizziness so she stopped taking it.  She also tried applying calamine lotion which has been temporarily helpful.  She denies any additional symptoms of chest pain, palpitations, shortness of breath, facial/oral swelling, paresthesia or other concerning symptoms at this time.    Rash  This is a new problem. The current episode started 1 to 4 weeks ago. The problem has been gradually worsening since onset. The affected locations include the left arm, right arm, right axilla, left axilla and back. The problem is moderate. The rash is characterized by itchiness. She was  exposed to an animal (mites). The rash first occurred at home. Associated symptoms include itching. Pertinent negatives include no congestion, cough, facial edema, fever, joint pain, rhinorrhea, shortness of breath or sore throat. Past treatments include anti-itch cream and antihistamine. The treatment provided mild relief.         History obtained from : patient  Review of Systems   Constitutional:  Negative for chills and fever.   HENT:  Negative for congestion, rhinorrhea and sore throat.    Eyes:  Negative for visual disturbance.   Respiratory:  Negative for cough, shortness of breath and wheezing.    Cardiovascular:  Negative for chest pain and palpitations.   Gastrointestinal:  Negative for abdominal pain.   Genitourinary:  Negative for decreased urine volume.   Musculoskeletal:  Negative for joint pain.   Skin:  Positive for itching and rash.   Neurological:  Positive for dizziness. Negative for light-headedness and headaches.   Psychiatric/Behavioral:  Negative for confusion. The patient is nervous/anxious.      Pertinent Medical History         Medical History Reviewed by provider this encounter:  Tobacco  Allergies  Meds  Problems  Med Hx  Surg Hx  Fam Hx       Past Medical History   Past Medical History:   Diagnosis Date    Abdominal pain     Back pain     Chest pain     COPD (chronic obstructive pulmonary disease) (HCC)     Dizziness     Fibromyalgia     GERD (gastroesophageal reflux disease)     Hypertension     Inflammatory bowel disease     Lightheadedness     RVOT ventricular tachycardia (HCC)     SOB (shortness of breath)      Past Surgical History:   Procedure Laterality Date    LUMBAR LAMINECTOMY      SPINE SURGERY       Family History   Problem Relation Age of Onset    Heart disease Mother      Current Outpatient Medications on File Prior to Visit   Medication Sig Dispense Refill    Acetaminophen-Codeine (TYLENOL WITH CODEINE #4 PO) q6h      fluticasone (FLONASE) 50 mcg/act nasal spray 1  "spray into each nostril 2 (two) times a day      hydrOXYzine pamoate (VISTARIL) 25 mg capsule Take 1 pill 3 times a day as needed for itching 30 capsule 0    MECLIZINE HCL PO Take 6.25 mg by mouth as needed.      metoprolol tartrate (LOPRESSOR) 25 mg tablet Take 25 mg by mouth 3 (three) times a day       No current facility-administered medications on file prior to visit.     Allergies   Allergen Reactions    Amoxicillin-Pot Clavulanate Diarrhea     Reaction Date: 29Feb2004;     Aspirin GI Intolerance    Epinephrine     Penicillins       Current Outpatient Medications on File Prior to Visit   Medication Sig Dispense Refill    Acetaminophen-Codeine (TYLENOL WITH CODEINE #4 PO) q6h      fluticasone (FLONASE) 50 mcg/act nasal spray 1 spray into each nostril 2 (two) times a day      hydrOXYzine pamoate (VISTARIL) 25 mg capsule Take 1 pill 3 times a day as needed for itching 30 capsule 0    MECLIZINE HCL PO Take 6.25 mg by mouth as needed.      metoprolol tartrate (LOPRESSOR) 25 mg tablet Take 25 mg by mouth 3 (three) times a day       No current facility-administered medications on file prior to visit.      Social History     Tobacco Use    Smoking status: Every Day     Current packs/day: 0.25     Types: Cigarettes    Smokeless tobacco: Never   Vaping Use    Vaping status: Never Used   Substance and Sexual Activity    Alcohol use: No    Drug use: No    Sexual activity: Not on file         Objective     Pulse 68   Ht 5' 3\" (1.6 m)   Wt 45.8 kg (101 lb)   SpO2 98%   BMI 17.89 kg/m²     Physical Exam  Vitals and nursing note reviewed.   Constitutional:       General: She is not in acute distress.     Appearance: Normal appearance. She is not ill-appearing, toxic-appearing or diaphoretic.   HENT:      Head: Normocephalic.   Eyes:      General:         Right eye: No discharge.         Left eye: No discharge.      Conjunctiva/sclera: Conjunctivae normal.   Cardiovascular:      Rate and Rhythm: Normal rate.   Pulmonary:    "   Effort: Pulmonary effort is normal.   Musculoskeletal:         General: Normal range of motion.      Cervical back: Normal range of motion.   Skin:     General: Skin is warm and dry.      Findings: Rash present.      Comments: Diffuse pruritic and erythematous papules extending up the patient's hands/forearms/arms B/L   Neurological:      Mental Status: She is alert and oriented to person, place, and time.   Psychiatric:         Behavior: Behavior normal.

## 2024-10-21 ENCOUNTER — OFFICE VISIT (OUTPATIENT)
Dept: INTERNAL MEDICINE CLINIC | Facility: CLINIC | Age: 74
End: 2024-10-21
Payer: COMMERCIAL

## 2024-10-21 VITALS
SYSTOLIC BLOOD PRESSURE: 102 MMHG | HEART RATE: 53 BPM | TEMPERATURE: 98 F | DIASTOLIC BLOOD PRESSURE: 62 MMHG | HEIGHT: 63 IN | BODY MASS INDEX: 17.65 KG/M2 | RESPIRATION RATE: 16 BRPM | WEIGHT: 99.6 LBS | OXYGEN SATURATION: 97 %

## 2024-10-21 DIAGNOSIS — L03.113 CELLULITIS OF RIGHT UPPER EXTREMITY: Primary | ICD-10-CM

## 2024-10-21 DIAGNOSIS — R21 RASH: ICD-10-CM

## 2024-10-21 PROCEDURE — G2211 COMPLEX E/M VISIT ADD ON: HCPCS | Performed by: INTERNAL MEDICINE

## 2024-10-21 PROCEDURE — 99213 OFFICE O/P EST LOW 20 MIN: CPT | Performed by: INTERNAL MEDICINE

## 2024-10-21 RX ORDER — PERMETHRIN 50 MG/G
CREAM TOPICAL ONCE
Qty: 60 G | Refills: 2 | Status: SHIPPED | OUTPATIENT
Start: 2024-10-21 | End: 2024-10-21

## 2024-10-21 RX ORDER — CLINDAMYCIN HYDROCHLORIDE 300 MG/1
300 CAPSULE ORAL 3 TIMES DAILY
Qty: 21 CAPSULE | Refills: 0 | Status: SHIPPED | OUTPATIENT
Start: 2024-10-21 | End: 2024-10-23 | Stop reason: SDUPTHER

## 2024-10-21 RX ORDER — DESOXIMETASONE 2.5 MG/G
CREAM TOPICAL
Qty: 60 G | Refills: 1 | Status: SHIPPED | OUTPATIENT
Start: 2024-10-21

## 2024-10-21 RX ORDER — PERMETHRIN 50 MG/G
CREAM TOPICAL
COMMUNITY
Start: 2024-10-17 | End: 2024-10-21 | Stop reason: SDUPTHER

## 2024-10-21 NOTE — PROGRESS NOTES
Dr. Rodriguez's Office Visit Note  10/21/24     Sherrie CHAES Tovar 74 y.o. female MRN: [id number]  : 1950    Assessment:     1. Rash  Assessment & Plan:  The persistent itching both forearm and erythematous maculopapular dense rash for last 4 days not improved with the previous treatment with permethrin with no oatmeal bath    The rash is more prominent only forearm could be contact dermatitis is due to the calamine lotion or any other etiology    Avoid calamine for now    Topicort 0.25% cream once a day affected area and Atarax 25 mg as needed for itching  Orders:  -     permethrin (ELIMITE) 5 % cream; Apply topically once for 1 dose  -     Ambulatory Referral to Dermatology; Future  -     desoximetasone (TOPICORT) 0.25 % cream; Apply once a day affected area  2. Cellulitis of right upper extremity  Assessment & Plan:  Patient was treated with Elimite for suspected scabies now comes in for redness persistent itching right forearm some on the left forearm suspected cellulitis although no fever chills    Will treat with clindamycin 300 mg 3 times a day patient cannot tolerate any other antibiotic  Orders:  -     clindamycin (CLEOCIN) 300 MG capsule; Take 1 capsule (300 mg total) by mouth 3 (three) times a day for 7 days        Discussion Summary and Plan:  Today's care plan and medications were reviewed with patient in detail and all their questions answered to their satisfaction.    Chief Complaint   Patient presents with    Rash     Possible infection on arms - started 2 weeks ago - R arm has worsened 10/11 - itching/burning with swelling - spreading    Can she use the cream more than once a day; hydroxyozine can she take 3 times a day      Subjective:  Came in persistent rash both forearm erythematous itching in spite of the treatment with Elimite calamine lotion and Atarax and we know oatmeal bath no improvement treated for detail refer to assessment plan visit diagnosis    Rash  Associated symptoms include  fatigue. Pertinent negatives include no congestion, cough, fever, rhinorrhea, shortness of breath, sore throat or vomiting.       The following portions of the patient's history were reviewed and updated as appropriate: allergies, current medications, past family history, past medical history, past social history, past surgical history and problem list.    Review of Systems   Constitutional:  Positive for activity change and fatigue. Negative for appetite change, chills, diaphoresis, fever and unexpected weight change.   HENT:  Negative for congestion, dental problem, drooling, ear discharge, ear pain, facial swelling, hearing loss, mouth sores, nosebleeds, postnasal drip, rhinorrhea, sinus pressure, sneezing, sore throat, tinnitus, trouble swallowing and voice change.    Eyes:  Negative for photophobia, pain, discharge, redness, itching and visual disturbance.   Respiratory:  Negative for apnea, cough, choking, chest tightness, shortness of breath, wheezing and stridor.    Cardiovascular:  Negative for chest pain, palpitations and leg swelling.   Gastrointestinal:  Negative for abdominal distention, anal bleeding, blood in stool, constipation, nausea, rectal pain and vomiting.   Endocrine: Negative for cold intolerance, heat intolerance, polydipsia, polyphagia and polyuria.   Genitourinary:  Negative for decreased urine volume, difficulty urinating, dysuria, enuresis, flank pain, frequency, genital sores, hematuria and urgency.   Musculoskeletal:  Positive for back pain, gait problem and myalgias. Negative for arthralgias, joint swelling, neck pain and neck stiffness.   Skin:  Positive for rash. Negative for color change, pallor and wound.   Allergic/Immunologic: Negative.  Negative for environmental allergies, food allergies and immunocompromised state.   Neurological:  Negative for tremors, seizures, syncope, facial asymmetry, speech difficulty, weakness, light-headedness, numbness and headaches.    Psychiatric/Behavioral:  Negative for agitation, behavioral problems, confusion, decreased concentration, dysphoric mood, hallucinations, self-injury, sleep disturbance and suicidal ideas. The patient is not nervous/anxious and is not hyperactive.          Historical Information   Patient Active Problem List   Diagnosis    Anxiety disorder    Chronic back pain    Chronic obstructive lung disease (HCC)    Depressive disorder    Hypertensive disorder    Irritable bowel syndrome    Lumbar post-laminectomy syndrome    Lumbar radiculopathy    Vertigo    Cervical radiculopathy    Continuous opioid dependence (HCC)    B12 deficiency    Mild protein-calorie malnutrition (HCC)    Acute superficial gastritis without hemorrhage    Subclavian artery stenosis (HCC)    Thyroid nodule    Cysts of both ovaries    History of fall    Acute midline low back pain without sciatica    Rash    Nausea    Gastroesophageal reflux disease without esophagitis    Personal history of COVID-19    Tinnitus of both ears    Cellulitis of right upper extremity     Past Medical History:   Diagnosis Date    Abdominal pain     Back pain     Chest pain     COPD (chronic obstructive pulmonary disease) (HCC)     Dizziness     Fibromyalgia     GERD (gastroesophageal reflux disease)     Hypertension     Inflammatory bowel disease     Lightheadedness     RVOT ventricular tachycardia (HCC)     SOB (shortness of breath)      Past Surgical History:   Procedure Laterality Date    LUMBAR LAMINECTOMY      SPINE SURGERY       Social History     Substance and Sexual Activity   Alcohol Use No     Social History     Substance and Sexual Activity   Drug Use No     Social History     Tobacco Use   Smoking Status Every Day    Current packs/day: 0.25    Types: Cigarettes   Smokeless Tobacco Never     Family History   Problem Relation Age of Onset    Heart disease Mother      Health Maintenance Due   Topic    Hepatitis C Screening     Pneumococcal Vaccine: 65+ Years (1 of  "2 - PCV)    Osteoporosis Screening     Zoster Vaccine (1 of 2)    RSV Vaccine Age 60+ Years (1 - 1-dose 60+ series)    Influenza Vaccine (1)    COVID-19 Vaccine (1 - 2023-24 season)      Meds/Allergies       Current Outpatient Medications:     Acetaminophen-Codeine (TYLENOL WITH CODEINE #4 PO), q6h, Disp: , Rfl:     clindamycin (CLEOCIN) 300 MG capsule, Take 1 capsule (300 mg total) by mouth 3 (three) times a day for 7 days, Disp: 21 capsule, Rfl: 0    desoximetasone (TOPICORT) 0.25 % cream, Apply once a day affected area, Disp: 60 g, Rfl: 1    fluticasone (FLONASE) 50 mcg/act nasal spray, 1 spray into each nostril 2 (two) times a day, Disp: , Rfl:     hydrOXYzine pamoate (VISTARIL) 25 mg capsule, Take 1 pill 3 times a day as needed for itching, Disp: 30 capsule, Rfl: 0    MECLIZINE HCL PO, Take 6.25 mg by mouth as needed., Disp: , Rfl:     metoprolol tartrate (LOPRESSOR) 25 mg tablet, Take 25 mg by mouth 3 (three) times a day Brand name only, Disp: , Rfl:     permethrin (ELIMITE) 5 % cream, Apply topically once for 1 dose, Disp: 60 g, Rfl: 2      Objective:    Vitals:   /62   Pulse (!) 53   Temp 98 °F (36.7 °C)   Resp 16   Ht 5' 3\" (1.6 m)   Wt 45.2 kg (99 lb 9.6 oz)   SpO2 97%   BMI 17.64 kg/m²   Body mass index is 17.64 kg/m².  Vitals:    10/21/24 1102   Weight: 45.2 kg (99 lb 9.6 oz)       Physical Exam  Vitals and nursing note reviewed.   Constitutional:       General: She is not in acute distress.     Appearance: She is well-developed. She is not ill-appearing, toxic-appearing or diaphoretic.   HENT:      Head: Normocephalic and atraumatic.      Right Ear: External ear normal.      Left Ear: External ear normal.      Nose: Nose normal.      Mouth/Throat:      Pharynx: No oropharyngeal exudate.   Eyes:      General: Lids are normal. Lids are everted, no foreign bodies appreciated. No scleral icterus.        Right eye: No discharge.         Left eye: No discharge.      Conjunctiva/sclera: " Conjunctivae normal.      Pupils: Pupils are equal, round, and reactive to light.   Neck:      Thyroid: No thyromegaly.      Vascular: Normal carotid pulses. No carotid bruit, hepatojugular reflux or JVD.      Trachea: No tracheal tenderness or tracheal deviation.   Cardiovascular:      Rate and Rhythm: Regular rhythm. Tachycardia present.      Pulses: Normal pulses.      Heart sounds: Murmur heard.      No friction rub. No gallop.   Pulmonary:      Effort: Pulmonary effort is normal. No respiratory distress.      Breath sounds: Normal breath sounds. No stridor. No wheezing or rales.   Chest:      Chest wall: No tenderness.   Abdominal:      General: Bowel sounds are normal. There is no distension.      Palpations: Abdomen is soft. There is no mass.      Tenderness: There is no abdominal tenderness. There is no guarding or rebound.   Musculoskeletal:         General: No tenderness or deformity. Normal range of motion.      Cervical back: Normal range of motion and neck supple. No edema, erythema or rigidity. No spinous process tenderness or muscular tenderness. Normal range of motion.   Lymphadenopathy:      Head:      Right side of head: No submental, submandibular, tonsillar, preauricular or posterior auricular adenopathy.      Left side of head: No submental, submandibular, tonsillar, preauricular, posterior auricular or occipital adenopathy.      Cervical: No cervical adenopathy.      Right cervical: No superficial, deep or posterior cervical adenopathy.     Left cervical: No superficial, deep or posterior cervical adenopathy.      Upper Body:      Right upper body: No pectoral adenopathy.      Left upper body: No pectoral adenopathy.   Skin:     General: Skin is warm and dry.      Coloration: Skin is not pale.      Findings: Rash present. No erythema.   Neurological:      General: No focal deficit present.      Mental Status: She is alert and oriented to person, place, and time.      Cranial Nerves: No cranial  nerve deficit.      Sensory: No sensory deficit.      Motor: No tremor, abnormal muscle tone or seizure activity.      Coordination: Coordination normal.      Gait: Gait abnormal.      Deep Tendon Reflexes: Reflexes are normal and symmetric. Reflexes normal.   Psychiatric:         Behavior: Behavior normal.         Thought Content: Thought content normal.         Judgment: Judgment normal.         Lab Review   No visits with results within 2 Month(s) from this visit.   Latest known visit with results is:   Appointment on 03/23/2023   Component Date Value Ref Range Status    WBC 03/23/2023 8.18  4.31 - 10.16 Thousand/uL Final    RBC 03/23/2023 4.67  3.81 - 5.12 Million/uL Final    Hemoglobin 03/23/2023 14.7  11.5 - 15.4 g/dL Final    Hematocrit 03/23/2023 46.4 (H)  34.8 - 46.1 % Final    MCV 03/23/2023 99 (H)  82 - 98 fL Final    MCH 03/23/2023 31.5  26.8 - 34.3 pg Final    MCHC 03/23/2023 31.7  31.4 - 37.4 g/dL Final    RDW 03/23/2023 13.0  11.6 - 15.1 % Final    MPV 03/23/2023 10.0  8.9 - 12.7 fL Final    Platelets 03/23/2023 315  149 - 390 Thousands/uL Final    nRBC 03/23/2023 0  /100 WBCs Final    Segmented % 03/23/2023 60  43 - 75 % Final    Immature Grans % 03/23/2023 0  0 - 2 % Final    Lymphocytes % 03/23/2023 31  14 - 44 % Final    Monocytes % 03/23/2023 6  4 - 12 % Final    Eosinophils Relative 03/23/2023 2  0 - 6 % Final    Basophils Relative 03/23/2023 1  0 - 1 % Final    Absolute Neutrophils 03/23/2023 4.89  1.85 - 7.62 Thousands/µL Final    Absolute Immature Grans 03/23/2023 0.02  0.00 - 0.20 Thousand/uL Final    Absolute Lymphocytes 03/23/2023 2.55  0.60 - 4.47 Thousands/µL Final    Absolute Monocytes 03/23/2023 0.47  0.17 - 1.22 Thousand/µL Final    Eosinophils Absolute 03/23/2023 0.20  0.00 - 0.61 Thousand/µL Final    Basophils Absolute 03/23/2023 0.05  0.00 - 0.10 Thousands/µL Final    Sodium 03/23/2023 138  135 - 147 mmol/L Final    Potassium 03/23/2023 4.4  3.5 - 5.3 mmol/L Final    Chloride  03/23/2023 106  96 - 108 mmol/L Final    CO2 03/23/2023 29  21 - 32 mmol/L Final    ANION GAP 03/23/2023 3 (L)  4 - 13 mmol/L Final    BUN 03/23/2023 12  5 - 25 mg/dL Final    Creatinine 03/23/2023 0.54 (L)  0.60 - 1.30 mg/dL Final    Standardized to IDMS reference method    Glucose, Fasting 03/23/2023 93  65 - 99 mg/dL Final    Specimen collection should occur prior to Sulfasalazine administration due to the potential for falsely depressed results. Specimen collection should occur prior to Sulfapyridine administration due to the potential for falsely elevated results.    Calcium 03/23/2023 9.2  8.3 - 10.1 mg/dL Final    AST 03/23/2023 16  5 - 45 U/L Final    Specimen collection should occur prior to Sulfasalazine administration due to the potential for falsely depressed results.     ALT 03/23/2023 22  12 - 78 U/L Final    Specimen collection should occur prior to Sulfasalazine and/or Sulfapyridine administration due to the potential for falsely depressed results.     Alkaline Phosphatase 03/23/2023 58  46 - 116 U/L Final    Total Protein 03/23/2023 6.4  6.4 - 8.4 g/dL Final    Albumin 03/23/2023 3.8  3.5 - 5.0 g/dL Final    Total Bilirubin 03/23/2023 0.91  0.20 - 1.00 mg/dL Final    Use of this assay is not recommended for patients undergoing treatment with eltrombopag due to the potential for falsely elevated results.    eGFR 03/23/2023 93  ml/min/1.73sq m Final    Hemoglobin A1C 03/23/2023 5.5  Normal 3.8-5.6%; PreDiabetic 5.7-6.4%; Diabetic >=6.5%; Glycemic control for adults with diabetes <7.0% % Final    EAG 03/23/2023 111  mg/dl Final    Cholesterol 03/23/2023 202 (H)  See Comment mg/dL Final    Cholesterol:         Pediatric <18 Years        Desirable          <170 mg/dL      Borderline High    170-199 mg/dL      High               >=200 mg/dL        Adult >=18 Years            Desirable         <200 mg/dL      Borderline High   200-239 mg/dL      High              >239 mg/dL      Triglycerides 03/23/2023  101  See Comment mg/dL Final    Triglyceride:     0-9Y            <75mg/dL     10Y-17Y         <90 mg/dL       >=18Y     Normal          <150 mg/dL     Borderline High 150-199 mg/dL     High            200-499 mg/dL        Very High       >499 mg/dL    Specimen collection should occur prior to N-Acetylcysteine or Metamizole administration due to the potential for falsely depressed results.    HDL, Direct 03/23/2023 81  >=50 mg/dL Final    LDL Calculated 03/23/2023 101 (H)  0 - 100 mg/dL Final    This screening LDL is a calculated result.   It does not have the accuracy of the Direct Measured LDL in the monitoring of patients with hyperlipidemia and/or statin therapy.   Direct Measure LDL ([id number]) must be ordered separately in these patients.  LDL Cholesterol:     Optimal           <100 mg/dl     Near Optimal      100-129 mg/dl     Above Optimal       Borderline High 130-159 mg/dl       High            160-189 mg/dl       Very High       >189 mg/dl           Color, UA 03/23/2023 Light Yellow   Final    Clarity, UA 03/23/2023 Clear   Final    Specific Gravity, UA 03/23/2023 1.015  1.003 - 1.030 Final    pH, UA 03/23/2023 6.0  4.5, 5.0, 5.5, 6.0, 6.5, 7.0, 7.5, 8.0 Final    Leukocytes, UA 03/23/2023 Negative  Negative Final    Nitrite, UA 03/23/2023 Negative  Negative Final    Protein, UA 03/23/2023 Negative  Negative mg/dl Final    Glucose, UA 03/23/2023 Negative  Negative mg/dl Final    Ketones, UA 03/23/2023 Negative  Negative mg/dl Final    Urobilinogen, UA 03/23/2023 <2.0  <2.0 mg/dl mg/dl Final    Bilirubin, UA 03/23/2023 Negative  Negative Final    Occult Blood, UA 03/23/2023 Negative  Negative Final    RBC, UA 03/23/2023 None Seen  None Seen, 1-2 /hpf Final    WBC, UA 03/23/2023 1-2  None Seen, 1-2 /hpf Final    Epithelial Cells 03/23/2023 Occasional  None Seen, Occasional /hpf Final    Bacteria, UA 03/23/2023 None Seen  None Seen, Occasional /hpf Final    Vit D, 25-Hydroxy 03/23/2023 9.9 (L)  30.0 - 100.0  "ng/mL Final    Vitamin B-12 03/23/2023 1,923 (H)  100 - 900 pg/mL Final         Patient Instructions   Follow-up with dermatology if needed continue using cortisone cream as directed     Devon Rodriguez MD        \"This note has been constructed using a voice recognition system.Therefore there may be syntax, spelling, and/or grammatical errors. Please call if you have any questions. \"  "

## 2024-10-21 NOTE — ASSESSMENT & PLAN NOTE
Patient was treated with Elimite for suspected scabies now comes in for redness persistent itching right forearm some on the left forearm suspected cellulitis although no fever chills    Will treat with clindamycin 300 mg 3 times a day patient cannot tolerate any other antibiotic

## 2024-10-21 NOTE — ASSESSMENT & PLAN NOTE
The persistent itching both forearm and erythematous maculopapular dense rash for last 4 days not improved with the previous treatment with permethrin with no oatmeal bath    The rash is more prominent only forearm could be contact dermatitis is due to the calamine lotion or any other etiology    Avoid calamine for now    Topicort 0.25% cream once a day affected area and Atarax 25 mg as needed for itching

## 2024-10-23 ENCOUNTER — TELEPHONE (OUTPATIENT)
Age: 74
End: 2024-10-23

## 2024-10-23 DIAGNOSIS — L03.113 CELLULITIS OF RIGHT UPPER EXTREMITY: Primary | ICD-10-CM

## 2024-10-23 RX ORDER — CLINDAMYCIN HYDROCHLORIDE 150 MG/1
150 CAPSULE ORAL EVERY 6 HOURS SCHEDULED
Qty: 28 CAPSULE | Refills: 0 | Status: SHIPPED | OUTPATIENT
Start: 2024-10-23 | End: 2024-10-30

## 2024-10-23 NOTE — TELEPHONE ENCOUNTER
Pt is calling. She picked up her Clindamycin 300mg from the pharmacy, however, she is concerned that she will not be able to tolerate the 300mg dose TID.  She states that she has been able to tolerate the 150mg dose in the past and is wondering if Dr. Rodriguez can adjust the dose for her.  Pharmacy is [facility] in [place]. Please call her back to advise.

## 2024-10-31 ENCOUNTER — OFFICE VISIT (OUTPATIENT)
Dept: INTERNAL MEDICINE CLINIC | Facility: CLINIC | Age: 74
End: 2024-10-31
Payer: COMMERCIAL

## 2024-10-31 ENCOUNTER — TELEPHONE (OUTPATIENT)
Age: 74
End: 2024-10-31

## 2024-10-31 VITALS
HEART RATE: 61 BPM | HEIGHT: 63 IN | WEIGHT: 98 LBS | BODY MASS INDEX: 17.36 KG/M2 | SYSTOLIC BLOOD PRESSURE: 74 MMHG | OXYGEN SATURATION: 98 % | DIASTOLIC BLOOD PRESSURE: 54 MMHG | TEMPERATURE: 98.7 F | RESPIRATION RATE: 16 BRPM

## 2024-10-31 DIAGNOSIS — R11.0 NAUSEA: ICD-10-CM

## 2024-10-31 DIAGNOSIS — E86.1 HYPOTENSION DUE TO HYPOVOLEMIA: Primary | ICD-10-CM

## 2024-10-31 PROCEDURE — G2211 COMPLEX E/M VISIT ADD ON: HCPCS | Performed by: INTERNAL MEDICINE

## 2024-10-31 PROCEDURE — 99213 OFFICE O/P EST LOW 20 MIN: CPT | Performed by: INTERNAL MEDICINE

## 2024-10-31 RX ORDER — ONDANSETRON HYDROCHLORIDE 4 MG/5ML
SOLUTION ORAL
Qty: 50 ML | Refills: 0 | Status: SHIPPED | OUTPATIENT
Start: 2024-10-31 | End: 2024-11-01

## 2024-10-31 RX ORDER — ONDANSETRON 4 MG/1
4 TABLET, FILM COATED ORAL EVERY 8 HOURS PRN
Qty: 20 TABLET | Refills: 0 | Status: CANCELLED | OUTPATIENT
Start: 2024-10-31

## 2024-10-31 NOTE — ASSESSMENT & PLAN NOTE
Nausea possibly could be viral slowly improving persistent    Given Zofran 4 mg 3 times a day liquid as needed for the treatment management same as under hypotension due to hypovolemia

## 2024-10-31 NOTE — PATIENT INSTRUCTIONS
"Patient Education     Orthostatic hypotension   The Basics   Written by the doctors and editors at [facility]   What is orthostatic hypotension? -- Orthostatic hypotension is a drop in blood pressure that can happen to some people when they stand up. This drop in blood pressure can make you feel dizzy or lightheaded. It can even make you pass out. Another term for orthostatic hypotension is \"postural hypotension.\"  What are the symptoms of orthostatic hypotension? -- The symptoms all happen when you stand up after sitting or lying down. They can include:   Feeling lightheaded or dizzy   Blurry or dim vision   Weakness   Fainting or passing out  What can cause orthostatic hypotension? -- There are many causes of orthostatic hypotension. It can happen if:   There is not enough fluid in your blood vessels - This can happen if you lose a lot of blood or if you are dehydrated. You can get dehydrated if:   You do not drink enough fluids.   You have severe diarrhea or vomiting.   You sweat a lot (for example, during exercise).   Your heart doesn't pump out enough blood.   The nerves and hormones in your body that control the blood vessels are not working properly.   You take certain medicines that can cause orthostatic hypotension - These include those used for:   High blood pressure   Chest pain caused by heart disease (called \"angina\")   Depression  In some people, orthostatic hypotension is tied to another condition, such as diabetes, nerve damage, or Parkinson disease. But people who are otherwise healthy can have orthostatic hypotension, too. Older people are more likely than younger people to have it.  Should I see a doctor or nurse? -- Yes. If you often feel dizzy or like you might pass out when you stand up, see your doctor or nurse. If you do pass out (faint), you should let your doctor know.  Is there a test for orthostatic hypotension? -- Yes. There are a few tests that can help your doctor or nurse find out if " "orthostatic hypotension is causing your symptoms. The simplest test is to take your blood pressure and pulse while you are sitting or lying down and then again after you stand up. Other tests could include:   Blood tests to see if you have a condition called \"anemia,\" which is when the body has too few red blood cells   Blood tests to check that your blood has the right chemical balance and that your fluid levels are in the right range   Tests to make sure that your heart is pumping correctly  How is orthostatic hypotension treated? -- They will start by treating the cause of your orthostatic hypotension, if possible. For example:   If you are dehydrated, your doctor or nurse will have you drink water to replace your fluids.   If your symptoms are not caused by dehydration, your doctor or nurse will find out if it is caused by any medicines you take. If so, they might switch you to another medicine or lower your dose.  Other treatments can help with your symptoms. These include:   Changes to your diet - For example, eating more salt and drinking more water might help some people.   Lifestyle changes - These can include things like changing how you sit, learning to stand up slowly, or avoiding hot and humid weather.   Wearing compression stockings with or without an abdominal binder - These devices apply pressure to the body and can help with certain types of orthostatic hypotension. Compression stockings are worn on the legs. The ones that go to your waist are the most helpful, but they can be hard to use. Abdominal binders are worn around the belly.   Medicines to treat orthostatic hypotension directly - If the other treatment options do not help, your doctor can prescribe a medicine to help with your symptoms. You might need to try more than 1 medicine.  In some cases, your doctor or nurse might ask you to track your blood pressure at home. They will show you how you can do this.  Is there anything I can do on my " own to feel better? -- Yes. There are a few things that you can do to reduce the problems caused by orthostatic hypotension. They are listed below. But you should try these things only after talking to your doctor or nurse.   Stand up slowly and give your body time to adapt. This is especially important when you get out of bed in the morning. Start by sitting up and waiting a moment. Then, swing your legs over the side of the bed and wait some more. When you do stand up, make sure you have something to hold onto in case you start to feel dizzy.   If you have symptoms after eating, it might help to make some changes to your eating habits. For example, you can try to avoid large meals, eat meals that are low in carbohydrates, and drink water with your food. Stand up slowly when you get up from the table.   Limit activities that could make you overheat or sweat a lot. Examples include taking hot showers, running, or hiking. These things can make orthostatic hypotension worse.   Make sure that you drink enough fluids, especially in hot weather.   Use extra pillows or an adjustable bed to make the head of your bed higher. This will raise your head above your heart slightly.   Avoid drinking a lot of alcohol.  What problems should I watch for? -- Orthostatic hypotension can lead to falls and injuries. People with orthostatic hypotension might also be at a higher risk for other health problems, including heart problems.  Call your doctor or nurse for advice if you fall and hit your head or injure yourself.  Call for emergency help right away (in the [place] and [place], call 9-1-1) if you have signs of a heart attack. These might include:   Chest pain, pressure, or discomfort   Breathing trouble, sweating, upset stomach, or cold and clammy skin   Pain in your arms, back, or jaw   Pain that gets worse with activity, like walking up stairs  All topics are updated as new evidence becomes available and our peer review process is  complete.  This topic retrieved from [employer] on: Feb 26, 2024.  Topic 87965 Version 15.0  Release: 32.2.4 - C32.56  © 2024 [employer], [employer]. and/or its affiliates. All rights reserved.  Consumer Information Use and Disclaimer   Disclaimer: This generalized information is a limited summary of diagnosis, treatment, and/or medication information. It is not meant to be comprehensive and should be used as a tool to help the user understand and/or assess potential diagnostic and treatment options. It does NOT include all information about conditions, treatments, medications, side effects, or risks that may apply to a specific patient. It is not intended to be medical advice or a substitute for the medical advice, diagnosis, or treatment of a health care provider based on the health care provider's examination and assessment of a patient's specific and unique circumstances. Patients must speak with a health care provider for complete information about their health, medical questions, and treatment options, including any risks or benefits regarding use of medications. This information does not endorse any treatments or medications as safe, effective, or approved for treating a specific patient. UpToDate, Inc[employer] and its affiliates disclaim any warranty or liability relating to this information or the use thereof.The use of this information is governed by the Terms of Use, available at https://www.woltersCourtanetuwer.com/en/know/clinical-effectiveness-terms. 2024© UpToDate, Inc. and its affiliates and/or licensors. All rights reserved.  Copyright   © 2024 [employer], Inc. and/or its affiliates. All rights reserved.

## 2024-10-31 NOTE — TELEPHONE ENCOUNTER
PA ondansetron (ZOFRAN) 4 MG/5ML sol SUBMITTED     via    []CMM-KEY:    [x]Surescripts-Case ID # [id number]  []Availity-Auth ID #  NDC #    []Faxed to plan   []Other website    []Phone call Case ID #      Office notes sent, clinical questions answered. Awaiting determination    Turnaround time for your insurance to make a decision on your Prior Authorization can take 7-21 business days.

## 2024-10-31 NOTE — TELEPHONE ENCOUNTER
PA ondansetron (ZOFRAN) 4 MG/5ML sol DENIED    Reason:(Screenshot if applicable)        Message sent to office clinical pool Yes    Denial letter scanned into Media Yes    Appeal started No (Provider will need to decide if appeal is warranted and send clinical documentation to Prior Authorization Team for initiation.)    **Please follow up with your patient regarding denial and next steps**

## 2024-10-31 NOTE — ASSESSMENT & PLAN NOTE
Blood pressure reviewed systolic 74 and diastolic 54 patient been feeling nauseous for last 3 days able to drink some fluid and eat small amount once or twice a day but no vomiting no abdominal pain no fever chills feeling dizzy especially when she stands up although able to ambulate with caution did not report any fall no difficulty breathing no chest pain clinically looks under the setting of nausea dehydration causing hypotension plus patient is on metoprolol 12.5 mg daily given by cardiology for suspected occasional PVC which is asymptomatic    Patient was advised to go to the emergency room for further intravenous fluid patient refused to go to the emergency room    Patient advised to stop and hold them metoprolol until the blood pressure comes back to systolic more than 90 patient refused    Given Zofran 4 mg liquid 3 times a day nausea    Advised to drink plenty of fluid lemonade Gatorade and OTHER fluids patient's choice    Any worsening or no improvement of the symptoms must go to the emergency room and advised all the fall precaution and advised not to drive until patient is better

## 2024-10-31 NOTE — PROGRESS NOTES
Dr. Rodriguez's Office Visit Note  10/31/24     Sherrie Tovar 74 y.o. female MRN: [id number]  : 1950    Assessment:     1. Nausea  -     ondansetron (ZOFRAN) 4 MG/5ML solution; Take 5 mL which is  1 teaspoonful 3 times a day as needed for nausea vomiting  2. Hypotension due to hypovolemia        Discussion Summary and Plan:  Today's care plan and medications were reviewed with patient in detail and all their questions answered to their satisfaction.    Chief Complaint   Patient presents with    Fatigue     Weak, dizzy, lightheaded - ongoing for awhile but has progressively gotten worse    Had cellulitis on arms and was prescribed clindamycin, desoximetasone (discontinued - burned skin), permethrin      Subjective:  Blood pressure reviewed systolic 74 and diastolic 54 patient been feeling nauseous for last 3 days able to drink some fluid and eat small amount once or twice a day but no vomiting no abdominal pain no fever chills feeling dizzy especially when she stands up although able to ambulate with caution did not report any fall no difficulty breathing no chest pain clinically looks under the setting of nausea dehydration causing hypotension plus patient is on metoprolol 12.5 mg daily given by cardiology for suspected occasional PVC which is asymptomatic    Fatigue  Associated symptoms include fatigue and nausea. Pertinent negatives include no abdominal pain, arthralgias, chest pain, chills, congestion, coughing, diaphoresis, fever, headaches, joint swelling, myalgias, neck pain, numbness, rash, sore throat, vomiting or weakness.       The following portions of the patient's history were reviewed and updated as appropriate: allergies, current medications, past family history, past medical history, past social history, past surgical history and problem list.    Review of Systems   Constitutional:  Positive for activity change and fatigue. Negative for appetite change, chills, diaphoresis, fever and unexpected  weight change.   HENT:  Negative for congestion, dental problem, drooling, ear discharge, ear pain, facial swelling, hearing loss, mouth sores, nosebleeds, postnasal drip, rhinorrhea, sinus pressure, sneezing, sore throat, tinnitus, trouble swallowing and voice change.    Eyes:  Negative for photophobia, pain, discharge, redness, itching and visual disturbance.   Respiratory:  Negative for apnea, cough, choking, chest tightness, shortness of breath, wheezing and stridor.    Cardiovascular:  Negative for chest pain, palpitations and leg swelling.   Gastrointestinal:  Positive for nausea. Negative for abdominal distention, abdominal pain, anal bleeding, blood in stool, constipation, diarrhea, rectal pain and vomiting.   Endocrine: Negative for cold intolerance, heat intolerance, polydipsia, polyphagia and polyuria.   Genitourinary:  Negative for decreased urine volume, difficulty urinating, dysuria, enuresis, flank pain, frequency, genital sores, hematuria and urgency.   Musculoskeletal:  Negative for arthralgias, back pain, gait problem, joint swelling, myalgias, neck pain and neck stiffness.   Skin:  Negative for color change, pallor, rash and wound.   Allergic/Immunologic: Negative.  Negative for environmental allergies, food allergies and immunocompromised state.   Neurological:  Positive for dizziness. Negative for tremors, seizures, syncope, facial asymmetry, speech difficulty, weakness, light-headedness, numbness and headaches.   Psychiatric/Behavioral:  Negative for agitation, behavioral problems, confusion, decreased concentration, dysphoric mood, hallucinations, self-injury, sleep disturbance and suicidal ideas. The patient is not nervous/anxious and is not hyperactive.          Historical Information   Patient Active Problem List   Diagnosis    Anxiety disorder    Chronic back pain    Chronic obstructive lung disease (HCC)    Depressive disorder    Hypertensive disorder    Irritable bowel syndrome    Lumbar  post-laminectomy syndrome    Lumbar radiculopathy    Vertigo    Cervical radiculopathy    Continuous opioid dependence (HCC)    B12 deficiency    Mild protein-calorie malnutrition (HCC)    Acute superficial gastritis without hemorrhage    Subclavian artery stenosis (HCC)    Thyroid nodule    Cysts of both ovaries    History of fall    Acute midline low back pain without sciatica    Rash    Nausea    Gastroesophageal reflux disease without esophagitis    Personal history of COVID-19    Tinnitus of both ears    Cellulitis of right upper extremity    Hypotension due to hypovolemia     Past Medical History:   Diagnosis Date    Abdominal pain     Back pain     Chest pain     COPD (chronic obstructive pulmonary disease) (HCC)     Dizziness     Fibromyalgia     GERD (gastroesophageal reflux disease)     Hypertension     Inflammatory bowel disease     Lightheadedness     RVOT ventricular tachycardia (HCC)     SOB (shortness of breath)      Past Surgical History:   Procedure Laterality Date    LUMBAR LAMINECTOMY      SPINE SURGERY       Social History     Substance and Sexual Activity   Alcohol Use No     Social History     Substance and Sexual Activity   Drug Use No     Social History     Tobacco Use   Smoking Status Every Day    Current packs/day: 0.25    Types: Cigarettes   Smokeless Tobacco Never     Family History   Problem Relation Age of Onset    Heart disease Mother      Health Maintenance Due   Topic    Hepatitis C Screening     Pneumococcal Vaccine: 65+ Years (1 of 2 - PCV)    Osteoporosis Screening     Zoster Vaccine (1 of 2)    RSV Vaccine Age 60+ Years (1 - 1-dose 60+ series)    Influenza Vaccine (1)    COVID-19 Vaccine (1 - 2023-24 season)      Meds/Allergies       Current Outpatient Medications:     ondansetron (ZOFRAN) 4 MG/5ML solution, Take 5 mL which is  1 teaspoonful 3 times a day as needed for nausea vomiting, Disp: 50 mL, Rfl: 0    Acetaminophen-Codeine (TYLENOL WITH CODEINE #4 PO), q6h, Disp: , Rfl:     " desoximetasone (TOPICORT) 0.25 % cream, Apply once a day affected area, Disp: 60 g, Rfl: 1    fluticasone (FLONASE) 50 mcg/act nasal spray, 1 spray into each nostril 2 (two) times a day, Disp: , Rfl:     hydrOXYzine pamoate (VISTARIL) 25 mg capsule, Take 1 pill 3 times a day as needed for itching, Disp: 30 capsule, Rfl: 0    MECLIZINE HCL PO, Take 6.25 mg by mouth as needed., Disp: , Rfl:     metoprolol tartrate (LOPRESSOR) 25 mg tablet, Take 25 mg by mouth 3 (three) times a day Brand name only, Disp: , Rfl:       Objective:    Vitals:   BP (!) 74/54   Pulse 61   Temp 98.7 °F (37.1 °C)   Resp 16   Ht 5' 3\" (1.6 m)   Wt 44.5 kg (98 lb)   SpO2 98%   BMI 17.36 kg/m²   Body mass index is 17.36 kg/m².  Vitals:    10/31/24 1235   Weight: 44.5 kg (98 lb)       Physical Exam  Vitals and nursing note reviewed.   Constitutional:       General: She is not in acute distress.     Appearance: She is well-developed. She is not ill-appearing, toxic-appearing or diaphoretic.   HENT:      Head: Normocephalic and atraumatic.      Right Ear: External ear normal.      Left Ear: External ear normal.      Nose: Nose normal.      Mouth/Throat:      Pharynx: No oropharyngeal exudate.   Eyes:      General: Lids are normal. Lids are everted, no foreign bodies appreciated. No scleral icterus.        Right eye: No discharge.         Left eye: No discharge.      Conjunctiva/sclera: Conjunctivae normal.      Pupils: Pupils are equal, round, and reactive to light.   Neck:      Thyroid: No thyromegaly.      Vascular: Normal carotid pulses. No carotid bruit, hepatojugular reflux or JVD.      Trachea: No tracheal tenderness or tracheal deviation.   Cardiovascular:      Rate and Rhythm: Normal rate and regular rhythm.      Pulses: Normal pulses.      Heart sounds: Murmur heard.      No friction rub. No gallop.   Pulmonary:      Effort: Pulmonary effort is normal. No respiratory distress.      Breath sounds: Normal breath sounds. No stridor. No " wheezing or rales.   Chest:      Chest wall: No tenderness.   Abdominal:      General: Bowel sounds are normal. There is no distension.      Palpations: Abdomen is soft. There is no mass.      Tenderness: There is no abdominal tenderness. There is no guarding or rebound.   Musculoskeletal:         General: No tenderness or deformity. Normal range of motion.      Cervical back: Normal range of motion and neck supple. No edema, erythema or rigidity. No spinous process tenderness or muscular tenderness. Normal range of motion.   Lymphadenopathy:      Head:      Right side of head: No submental, submandibular, tonsillar, preauricular or posterior auricular adenopathy.      Left side of head: No submental, submandibular, tonsillar, preauricular, posterior auricular or occipital adenopathy.      Cervical: No cervical adenopathy.      Right cervical: No superficial, deep or posterior cervical adenopathy.     Left cervical: No superficial, deep or posterior cervical adenopathy.      Upper Body:      Right upper body: No pectoral adenopathy.      Left upper body: No pectoral adenopathy.   Skin:     General: Skin is warm and dry.      Coloration: Skin is not pale.      Findings: No erythema or rash.   Neurological:      General: No focal deficit present.      Mental Status: She is alert and oriented to person, place, and time.      Cranial Nerves: No cranial nerve deficit.      Sensory: No sensory deficit.      Motor: No tremor, abnormal muscle tone or seizure activity.      Coordination: Coordination normal.      Deep Tendon Reflexes: Reflexes are normal and symmetric. Reflexes normal.   Psychiatric:         Behavior: Behavior normal.         Thought Content: Thought content normal.         Judgment: Judgment normal.         Lab Review   No visits with results within 2 Month(s) from this visit.   Latest known visit with results is:   Appointment on 03/23/2023   Component Date Value Ref Range Status    WBC 03/23/2023 8.18   4.31 - 10.16 Thousand/uL Final    RBC 03/23/2023 4.67  3.81 - 5.12 Million/uL Final    Hemoglobin 03/23/2023 14.7  11.5 - 15.4 g/dL Final    Hematocrit 03/23/2023 46.4 (H)  34.8 - 46.1 % Final    MCV 03/23/2023 99 (H)  82 - 98 fL Final    MCH 03/23/2023 31.5  26.8 - 34.3 pg Final    MCHC 03/23/2023 31.7  31.4 - 37.4 g/dL Final    RDW 03/23/2023 13.0  11.6 - 15.1 % Final    MPV 03/23/2023 10.0  8.9 - 12.7 fL Final    Platelets 03/23/2023 315  149 - 390 Thousands/uL Final    nRBC 03/23/2023 0  /100 WBCs Final    Segmented % 03/23/2023 60  43 - 75 % Final    Immature Grans % 03/23/2023 0  0 - 2 % Final    Lymphocytes % 03/23/2023 31  14 - 44 % Final    Monocytes % 03/23/2023 6  4 - 12 % Final    Eosinophils Relative 03/23/2023 2  0 - 6 % Final    Basophils Relative 03/23/2023 1  0 - 1 % Final    Absolute Neutrophils 03/23/2023 4.89  1.85 - 7.62 Thousands/µL Final    Absolute Immature Grans 03/23/2023 0.02  0.00 - 0.20 Thousand/uL Final    Absolute Lymphocytes 03/23/2023 2.55  0.60 - 4.47 Thousands/µL Final    Absolute Monocytes 03/23/2023 0.47  0.17 - 1.22 Thousand/µL Final    Eosinophils Absolute 03/23/2023 0.20  0.00 - 0.61 Thousand/µL Final    Basophils Absolute 03/23/2023 0.05  0.00 - 0.10 Thousands/µL Final    Sodium 03/23/2023 138  135 - 147 mmol/L Final    Potassium 03/23/2023 4.4  3.5 - 5.3 mmol/L Final    Chloride 03/23/2023 106  96 - 108 mmol/L Final    CO2 03/23/2023 29  21 - 32 mmol/L Final    ANION GAP 03/23/2023 3 (L)  4 - 13 mmol/L Final    BUN 03/23/2023 12  5 - 25 mg/dL Final    Creatinine 03/23/2023 0.54 (L)  0.60 - 1.30 mg/dL Final    Standardized to IDMS reference method    Glucose, Fasting 03/23/2023 93  65 - 99 mg/dL Final    Specimen collection should occur prior to Sulfasalazine administration due to the potential for falsely depressed results. Specimen collection should occur prior to Sulfapyridine administration due to the potential for falsely elevated results.    Calcium 03/23/2023 9.2  8.3  - 10.1 mg/dL Final    AST 03/23/2023 16  5 - 45 U/L Final    Specimen collection should occur prior to Sulfasalazine administration due to the potential for falsely depressed results.     ALT 03/23/2023 22  12 - 78 U/L Final    Specimen collection should occur prior to Sulfasalazine and/or Sulfapyridine administration due to the potential for falsely depressed results.     Alkaline Phosphatase 03/23/2023 58  46 - 116 U/L Final    Total Protein 03/23/2023 6.4  6.4 - 8.4 g/dL Final    Albumin 03/23/2023 3.8  3.5 - 5.0 g/dL Final    Total Bilirubin 03/23/2023 0.91  0.20 - 1.00 mg/dL Final    Use of this assay is not recommended for patients undergoing treatment with eltrombopag due to the potential for falsely elevated results.    eGFR 03/23/2023 93  ml/min/1.73sq m Final    Hemoglobin A1C 03/23/2023 5.5  Normal 3.8-5.6%; PreDiabetic 5.7-6.4%; Diabetic >=6.5%; Glycemic control for adults with diabetes <7.0% % Final    EAG 03/23/2023 111  mg/dl Final    Cholesterol 03/23/2023 202 (H)  See Comment mg/dL Final    Cholesterol:         Pediatric <18 Years        Desirable          <170 mg/dL      Borderline High    170-199 mg/dL      High               >=200 mg/dL        Adult >=18 Years            Desirable         <200 mg/dL      Borderline High   200-239 mg/dL      High              >239 mg/dL      Triglycerides 03/23/2023 101  See Comment mg/dL Final    Triglyceride:     0-9Y            <75mg/dL     10Y-17Y         <90 mg/dL       >=18Y     Normal          <150 mg/dL     Borderline High 150-199 mg/dL     High            200-499 mg/dL        Very High       >499 mg/dL    Specimen collection should occur prior to N-Acetylcysteine or Metamizole administration due to the potential for falsely depressed results.    HDL, Direct 03/23/2023 81  >=50 mg/dL Final    LDL Calculated 03/23/2023 101 (H)  0 - 100 mg/dL Final    This screening LDL is a calculated result.   It does not have the accuracy of the Direct Measured LDL in the  "monitoring of patients with hyperlipidemia and/or statin therapy.   Direct Measure LDL ([id number]) must be ordered separately in these patients.  LDL Cholesterol:     Optimal           <100 mg/dl     Near Optimal      100-129 mg/dl     Above Optimal       Borderline High 130-159 mg/dl       High            160-189 mg/dl       Very High       >189 mg/dl           Color, UA 03/23/2023 Light Yellow   Final    Clarity, UA 03/23/2023 Clear   Final    Specific Gravity, UA 03/23/2023 1.015  1.003 - 1.030 Final    pH, UA 03/23/2023 6.0  4.5, 5.0, 5.5, 6.0, 6.5, 7.0, 7.5, 8.0 Final    Leukocytes, UA 03/23/2023 Negative  Negative Final    Nitrite, UA 03/23/2023 Negative  Negative Final    Protein, UA 03/23/2023 Negative  Negative mg/dl Final    Glucose, UA 03/23/2023 Negative  Negative mg/dl Final    Ketones, UA 03/23/2023 Negative  Negative mg/dl Final    Urobilinogen, UA 03/23/2023 <2.0  <2.0 mg/dl mg/dl Final    Bilirubin, UA 03/23/2023 Negative  Negative Final    Occult Blood, UA 03/23/2023 Negative  Negative Final    RBC, UA 03/23/2023 None Seen  None Seen, 1-2 /hpf Final    WBC, UA 03/23/2023 1-2  None Seen, 1-2 /hpf Final    Epithelial Cells 03/23/2023 Occasional  None Seen, Occasional /hpf Final    Bacteria, UA 03/23/2023 None Seen  None Seen, Occasional /hpf Final    Vit D, 25-Hydroxy 03/23/2023 9.9 (L)  30.0 - 100.0 ng/mL Final    Vitamin B-12 03/23/2023 1,923 (H)  100 - 900 pg/mL Final         Patient Instructions   Patient Education     Orthostatic hypotension   The Basics   Written by the doctors and editors at [facility]   What is orthostatic hypotension? -- Orthostatic hypotension is a drop in blood pressure that can happen to some people when they stand up. This drop in blood pressure can make you feel dizzy or lightheaded. It can even make you pass out. Another term for orthostatic hypotension is \"postural hypotension.\"  What are the symptoms of orthostatic hypotension? -- The symptoms all happen when you stand up " "after sitting or lying down. They can include:   Feeling lightheaded or dizzy   Blurry or dim vision   Weakness   Fainting or passing out  What can cause orthostatic hypotension? -- There are many causes of orthostatic hypotension. It can happen if:   There is not enough fluid in your blood vessels - This can happen if you lose a lot of blood or if you are dehydrated. You can get dehydrated if:   You do not drink enough fluids.   You have severe diarrhea or vomiting.   You sweat a lot (for example, during exercise).   Your heart doesn't pump out enough blood.   The nerves and hormones in your body that control the blood vessels are not working properly.   You take certain medicines that can cause orthostatic hypotension - These include those used for:   High blood pressure   Chest pain caused by heart disease (called \"angina\")   Depression  In some people, orthostatic hypotension is tied to another condition, such as diabetes, nerve damage, or Parkinson disease. But people who are otherwise healthy can have orthostatic hypotension, too. Older people are more likely than younger people to have it.  Should I see a doctor or nurse? -- Yes. If you often feel dizzy or like you might pass out when you stand up, see your doctor or nurse. If you do pass out (faint), you should let your doctor know.  Is there a test for orthostatic hypotension? -- Yes. There are a few tests that can help your doctor or nurse find out if orthostatic hypotension is causing your symptoms. The simplest test is to take your blood pressure and pulse while you are sitting or lying down and then again after you stand up. Other tests could include:   Blood tests to see if you have a condition called \"anemia,\" which is when the body has too few red blood cells   Blood tests to check that your blood has the right chemical balance and that your fluid levels are in the right range   Tests to make sure that your heart is pumping correctly  How is " orthostatic hypotension treated? -- They will start by treating the cause of your orthostatic hypotension, if possible. For example:   If you are dehydrated, your doctor or nurse will have you drink water to replace your fluids.   If your symptoms are not caused by dehydration, your doctor or nurse will find out if it is caused by any medicines you take. If so, they might switch you to another medicine or lower your dose.  Other treatments can help with your symptoms. These include:   Changes to your diet - For example, eating more salt and drinking more water might help some people.   Lifestyle changes - These can include things like changing how you sit, learning to stand up slowly, or avoiding hot and humid weather.   Wearing compression stockings with or without an abdominal binder - These devices apply pressure to the body and can help with certain types of orthostatic hypotension. Compression stockings are worn on the legs. The ones that go to your waist are the most helpful, but they can be hard to use. Abdominal binders are worn around the belly.   Medicines to treat orthostatic hypotension directly - If the other treatment options do not help, your doctor can prescribe a medicine to help with your symptoms. You might need to try more than 1 medicine.  In some cases, your doctor or nurse might ask you to track your blood pressure at home. They will show you how you can do this.  Is there anything I can do on my own to feel better? -- Yes. There are a few things that you can do to reduce the problems caused by orthostatic hypotension. They are listed below. But you should try these things only after talking to your doctor or nurse.   Stand up slowly and give your body time to adapt. This is especially important when you get out of bed in the morning. Start by sitting up and waiting a moment. Then, swing your legs over the side of the bed and wait some more. When you do stand up, make sure you have something  to hold onto in case you start to feel dizzy.   If you have symptoms after eating, it might help to make some changes to your eating habits. For example, you can try to avoid large meals, eat meals that are low in carbohydrates, and drink water with your food. Stand up slowly when you get up from the table.   Limit activities that could make you overheat or sweat a lot. Examples include taking hot showers, running, or hiking. These things can make orthostatic hypotension worse.   Make sure that you drink enough fluids, especially in hot weather.   Use extra pillows or an adjustable bed to make the head of your bed higher. This will raise your head above your heart slightly.   Avoid drinking a lot of alcohol.  What problems should I watch for? -- Orthostatic hypotension can lead to falls and injuries. People with orthostatic hypotension might also be at a higher risk for other health problems, including heart problems.  Call your doctor or nurse for advice if you fall and hit your head or injure yourself.  Call for emergency help right away (in the [place] and [place], call 9-1-1) if you have signs of a heart attack. These might include:   Chest pain, pressure, or discomfort   Breathing trouble, sweating, upset stomach, or cold and clammy skin   Pain in your arms, back, or jaw   Pain that gets worse with activity, like walking up stairs  All topics are updated as new evidence becomes available and our peer review process is complete.  This topic retrieved from [employer] on: Feb 26, 2024.  Topic 13559 Version 15.0  Release: 32.2.4 - C32.56  © 2024 UpToDate, Inc. and/or its affiliates. All rights reserved.  Consumer Information Use and Disclaimer   Disclaimer: This generalized information is a limited summary of diagnosis, treatment, and/or medication information. It is not meant to be comprehensive and should be used as a tool to help the user understand and/or assess potential diagnostic and treatment options. It does NOT  "include all information about conditions, treatments, medications, side effects, or risks that may apply to a specific patient. It is not intended to be medical advice or a substitute for the medical advice, diagnosis, or treatment of a health care provider based on the health care provider's examination and assessment of a patient's specific and unique circumstances. Patients must speak with a health care provider for complete information about their health, medical questions, and treatment options, including any risks or benefits regarding use of medications. This information does not endorse any treatments or medications as safe, effective, or approved for treating a specific patient. UpToDate, Inc. and its affiliates disclaim any warranty or liability relating to this information or the use thereof.The use of this information is governed by the Terms of Use, available at https://www.[employer].com/en/know/clinical-effectiveness-terms. 2024© UpToDate, Inc. and its affiliates and/or licensors. All rights reserved.  Copyright   © 2024 Up[employer]Date, Inc. and/or its affiliates. All rights reserved.       Devon Rodriguez MD        \"This note has been constructed using a voice recognition system.Therefore there may be syntax, spelling, and/or grammatical errors. Please call if you have any questions. \"  "

## 2024-11-01 DIAGNOSIS — R11.0 NAUSEA: Primary | ICD-10-CM

## 2024-11-01 RX ORDER — ONDANSETRON 4 MG/1
4 TABLET, FILM COATED ORAL EVERY 8 HOURS PRN
Qty: 20 TABLET | Refills: 0 | Status: SHIPPED | OUTPATIENT
Start: 2024-11-01

## 2024-11-01 NOTE — TELEPHONE ENCOUNTER
PA ondansetron (ZOFRAN) 4 mg CANCELLED     due to     []Approval on file-dates approved    [x]Medication already on Formulary  []Brand Name Preferred  []Patient no longer covered by insurance    Message sent to office clinical pool   Yes    Scanned into Media  No

## 2024-11-01 NOTE — TELEPHONE ENCOUNTER
PA ondansetron (ZOFRAN) 4 mg SUBMITTED     via    []CMM-KEY:    [x]Surescripts-Case ID # [id number]  []Availity-Auth ID #  NDC #    []Faxed to plan   []Other website    []Phone call Case ID #      Office notes sent, clinical questions answered. Awaiting determination    Turnaround time for your insurance to make a decision on your Prior Authorization can take 7-21 business days.

## 2024-11-11 ENCOUNTER — OFFICE VISIT (OUTPATIENT)
Dept: INTERNAL MEDICINE CLINIC | Facility: CLINIC | Age: 74
End: 2024-11-11
Payer: COMMERCIAL

## 2024-11-11 VITALS
HEIGHT: 63 IN | DIASTOLIC BLOOD PRESSURE: 62 MMHG | RESPIRATION RATE: 16 BRPM | OXYGEN SATURATION: 99 % | WEIGHT: 98 LBS | TEMPERATURE: 98.1 F | HEART RATE: 61 BPM | BODY MASS INDEX: 17.36 KG/M2 | SYSTOLIC BLOOD PRESSURE: 102 MMHG

## 2024-11-11 DIAGNOSIS — S09.93XA BLUNT TRAUMA OF FACE, INITIAL ENCOUNTER: ICD-10-CM
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
## 2024-11-11 DIAGNOSIS — T14.8XXA ABRASION: ICD-10-CM

## 2024-11-11 PROCEDURE — 99213 OFFICE O/P EST LOW 20 MIN: CPT

## 2024-11-11 PROCEDURE — G2211 COMPLEX E/M VISIT ADD ON: HCPCS

## 2024-11-11 RX ORDER — PERMETHRIN 50 MG/G
CREAM TOPICAL
COMMUNITY
Start: 2024-10-22

## 2024-11-11 NOTE — PROGRESS NOTES
Ambulatory Visit  Name: Sherrie Tovar      : 1950      MRN: [id number]  Encounter Provider: Renee Wallace MD  Encounter Date: 2024   Encounter department: [facility] INTERNAL MEDICINE    Assessment & Plan  Fall, initial encounter  Fall with traumatic injury and abrasions to face, hands and arms that occurred yesterday evening--- no active bleeding  BP today 102/62, significantly improved from 1 week ago (74/54) due to dehydration/hypovolemia  No LOC; no blood thinner  No overt signs of concussion or skull fracture; Neuro exam WNL  Mild tenderness to palpation around facial abrasions  Dental visit this AM for dental extraction of remnant injured teeth--- pt will return for follow up procedure  Assisted device (cane) procured today to help with balance and ambulation  Patient will continue with vestibular rehab therapy  She will be seeing an eye doctor for poor vision  Continue with supportive care  ED precautions discussed       Blunt trauma of face, initial encounter  Plan noted above       Abrasion  Plan noted above       Return if symptoms worsen or fail to improve, for Next scheduled follow up.     History of Present Illness     HPI  Patient presents for evaluation after sustaining a fall yesterday evening while staying at her daughter and son-in-law's house. When she got up to take the dog out around 8 pm, she feels as though her arthritic symptoms in her left leg flared, causing momentary weakness and loss of balance leading to a traumatic fall onto her face and right side of her body. She broke 2 of her teeth and had abrasions on her face, hands and arms and a nosebleed. No LOC and not taking blood thinners. She was seen by the dentist earlier today for dental repair. She did not feel the need to go to the ER.   She denies any headache, dizziness, memory impairment, difficulty concentrating, slurred speech, unilateral weakness, chest pain, palpitations, shortness of breath,  abdominal pain or other concerning symptoms at this time.  She has a past medical history of vertigo, radiculopathy and was recently evaluated for orthostatic hypotension secondary to dehydration.     History obtained from : patient    Review of Systems   Constitutional:  Negative for chills and fever.   HENT:  Positive for nosebleeds (dried blood; no active bleeding). Negative for congestion and facial swelling.    Eyes:  Positive for visual disturbance. Negative for photophobia, pain, discharge, redness and itching.   Respiratory:  Negative for shortness of breath.    Cardiovascular:  Negative for chest pain and palpitations.   Gastrointestinal:  Negative for abdominal pain, nausea and vomiting.   Genitourinary:  Negative for flank pain.   Musculoskeletal:  Positive for arthralgias, gait problem and myalgias.   Skin:  Positive for wound.   Neurological:  Negative for dizziness, syncope, light-headedness and headaches.   Psychiatric/Behavioral:  Negative for confusion.        Pertinent Medical History         Medical History Reviewed by provider this encounter:  Tobacco  Allergies  Meds  Problems  Med Hx  Surg Hx  Fam Hx       Past Medical History   Past Medical History:   Diagnosis Date    Abdominal pain     Back pain     Chest pain     COPD (chronic obstructive pulmonary disease) (HCC)     Dizziness     Fibromyalgia     GERD (gastroesophageal reflux disease)     Hypertension     Inflammatory bowel disease     Lightheadedness     RVOT ventricular tachycardia (HCC)     SOB (shortness of breath)      Past Surgical History:   Procedure Laterality Date    LUMBAR LAMINECTOMY      SPINE SURGERY       Family History   Problem Relation Age of Onset    Heart disease Mother      Current Outpatient Medications on File Prior to Visit   Medication Sig Dispense Refill    Acetaminophen-Codeine (TYLENOL WITH CODEINE #4 PO) q6h      desoximetasone (TOPICORT) 0.25 % cream Apply once a day affected area 60 g 1    fluticasone  (FLONASE) 50 mcg/act nasal spray 1 spray into each nostril 2 (two) times a day      hydrOXYzine pamoate (VISTARIL) 25 mg capsule Take 1 pill 3 times a day as needed for itching 30 capsule 0    MECLIZINE HCL PO Take 6.25 mg by mouth as needed.      metoprolol tartrate (LOPRESSOR) 25 mg tablet Take 25 mg by mouth 3 (three) times a day Brand name only      ondansetron (ZOFRAN) 4 mg tablet Take 1 tablet (4 mg total) by mouth every 8 (eight) hours as needed for nausea or vomiting 20 tablet 0    permethrin (ELIMITE) 5 % cream apply topically ONCE for 1 dose       No current facility-administered medications on file prior to visit.     Allergies   Allergen Reactions    Amoxicillin-Pot Clavulanate Diarrhea     Reaction Date: [id number];     Aspirin GI Intolerance    Epinephrine     Penicillins       Current Outpatient Medications on File Prior to Visit   Medication Sig Dispense Refill    Acetaminophen-Codeine (TYLENOL WITH CODEINE #4 PO) q6h      desoximetasone (TOPICORT) 0.25 % cream Apply once a day affected area 60 g 1    fluticasone (FLONASE) 50 mcg/act nasal spray 1 spray into each nostril 2 (two) times a day      hydrOXYzine pamoate (VISTARIL) 25 mg capsule Take 1 pill 3 times a day as needed for itching 30 capsule 0    MECLIZINE HCL PO Take 6.25 mg by mouth as needed.      metoprolol tartrate (LOPRESSOR) 25 mg tablet Take 25 mg by mouth 3 (three) times a day Brand name only      ondansetron (ZOFRAN) 4 mg tablet Take 1 tablet (4 mg total) by mouth every 8 (eight) hours as needed for nausea or vomiting 20 tablet 0    permethrin (ELIMITE) 5 % cream apply topically ONCE for 1 dose       No current facility-administered medications on file prior to visit.      Social History     Tobacco Use    Smoking status: Every Day     Current packs/day: 0.25     Types: Cigarettes    Smokeless tobacco: Never   Vaping Use    Vaping status: Never Used   Substance and Sexual Activity    Alcohol use: No    Drug use: No    Sexual activity:  "Not on file         Objective     /62   Pulse 61   Temp 98.1 °F (36.7 °C)   Resp 16   Ht 5' 3\" (1.6 m)   Wt 44.5 kg (98 lb)   SpO2 99%   BMI 17.36 kg/m²     Physical Exam  Vitals and nursing note reviewed.   Constitutional:       General: She is not in acute distress.     Appearance: She is not ill-appearing or toxic-appearing.   HENT:      Head: Normocephalic. Abrasion present. No laceration.      Jaw: No tenderness or pain on movement.      Right Ear: External ear normal.      Left Ear: External ear normal.      Nose: Signs of injury present. No nasal deformity, septal deviation, nasal tenderness, congestion or rhinorrhea.      Right Sinus: No maxillary sinus tenderness or frontal sinus tenderness.      Left Sinus: No maxillary sinus tenderness or frontal sinus tenderness.      Mouth/Throat:      Mouth: Mucous membranes are moist. Injury present.      Dentition: Abnormal dentition. Dental tenderness present.      Pharynx: Oropharynx is clear.      Comments: Absent upper right teeth (2) due to fall/facial trauma  Eyes:      General:         Right eye: No discharge.         Left eye: No discharge.      Extraocular Movements: Extraocular movements intact.      Conjunctiva/sclera: Conjunctivae normal.      Pupils: Pupils are equal, round, and reactive to light.   Cardiovascular:      Rate and Rhythm: Normal rate and regular rhythm.      Pulses: Normal pulses.      Heart sounds: Normal heart sounds.   Pulmonary:      Effort: Pulmonary effort is normal. No respiratory distress.      Breath sounds: Normal breath sounds.   Abdominal:      Palpations: Abdomen is soft.      Tenderness: There is no abdominal tenderness.   Musculoskeletal:         General: Signs of injury present. No swelling or deformity. Normal range of motion.      Cervical back: Normal range of motion and neck supple. No rigidity or tenderness.   Skin:     General: Skin is warm and dry.      Findings: Abrasion present.      Comments: " Abrasions on right side of face, hands and arms b/l (bandaged)-- no active bleeding   Neurological:      Mental Status: She is alert and oriented to person, place, and time.   Psychiatric:         Mood and Affect: Mood normal.         Behavior: Behavior normal.

## 2025-01-02 ENCOUNTER — TELEPHONE (OUTPATIENT)
Age: 75
End: 2025-01-02

## 2025-01-02 DIAGNOSIS — L03.113 CELLULITIS OF RIGHT UPPER EXTREMITY: ICD-10-CM

## 2025-01-02 DIAGNOSIS — Z01.20 DENTAL EXAMINATION: Primary | ICD-10-CM

## 2025-01-02 RX ORDER — CLINDAMYCIN HYDROCHLORIDE 150 MG/1
CAPSULE ORAL
Qty: 4 CAPSULE | Refills: 0 | Status: SHIPPED | OUTPATIENT
Start: 2025-01-02 | End: 2025-01-03

## 2025-01-02 NOTE — TELEPHONE ENCOUNTER
Patient called stating she is having dental work done on Monday and needs a prescription for clindamycin 150mg capsules (quantity of 4) sent to the [facility] in [place]

## 2025-01-09 ENCOUNTER — OFFICE VISIT (OUTPATIENT)
Dept: INTERNAL MEDICINE CLINIC | Facility: CLINIC | Age: 75
End: 2025-01-09
Payer: COMMERCIAL

## 2025-01-09 VITALS
SYSTOLIC BLOOD PRESSURE: 102 MMHG | OXYGEN SATURATION: 98 % | BODY MASS INDEX: 17.68 KG/M2 | WEIGHT: 99.8 LBS | HEIGHT: 63 IN | DIASTOLIC BLOOD PRESSURE: 64 MMHG | HEART RATE: 77 BPM

## 2025-01-09 DIAGNOSIS — E44.1 MILD PROTEIN-CALORIE MALNUTRITION (HCC): ICD-10-CM

## 2025-01-09 DIAGNOSIS — R42 VERTIGO: ICD-10-CM

## 2025-01-09 DIAGNOSIS — M96.1 LUMBAR POST-LAMINECTOMY SYNDROME: Primary | ICD-10-CM

## 2025-01-09 DIAGNOSIS — M54.12 CERVICAL RADICULOPATHY: ICD-10-CM

## 2025-01-09 DIAGNOSIS — R29.898 WEAKNESS OF LEFT LOWER EXTREMITY: ICD-10-CM

## 2025-01-09 PROBLEM — I77.1 SUBCLAVIAN ARTERY STENOSIS (HCC): Status: RESOLVED | Noted: 2023-03-07 | Resolved: 2025-01-09

## 2025-01-09 PROBLEM — J44.9 CHRONIC OBSTRUCTIVE LUNG DISEASE (HCC): Status: RESOLVED | Noted: 2022-08-22 | Resolved: 2025-01-09

## 2025-01-09 PROCEDURE — 99213 OFFICE O/P EST LOW 20 MIN: CPT | Performed by: INTERNAL MEDICINE

## 2025-01-09 NOTE — ASSESSMENT & PLAN NOTE
Pain cervical spine rating both upper extremity seen by pain management advised epidural injection patient refused awaiting to be reevaluated by pain management until then patient does not like to use anything else other than Tylenol to use as needed start physical therapy  Orders:  •  Ambulatory Referral to Physical Therapy; Future

## 2025-01-09 NOTE — PATIENT INSTRUCTIONS
Patient Education     Preventing falls in adults   The Basics   Written by the doctors and editors at [facility]   Am I at risk of falling? -- Falls can happen to anyone, no matter how old they are. Several things can increase your risk of a fall, including:   Vision problems   Having certain chronic health conditions, being sick, having recently been in the hospital, or having had surgery   Changing the medicines you take   An unsafe or unfamiliar setting (for example, a room with rugs or furniture that might trip you, or an area you don't know well)  Your risk of falling increases as you grow older. That's because getting older can make it harder to walk steadily and keep your balance. Also, the effects of falls are more serious for older people.  Overall, 3 to 4 out of every 10 people over the age of 65 fall each year. Many people who fracture a hip never fully recover to how they were before the fracture. If you have fallen in the past, you are at higher risk of falling again.  How can my doctor help me avoid falling? -- Your doctor can talk to you about the following things:   Past falls - It is important to tell your doctor about any times that you have fallen or almost fallen. They can then suggest ways to prevent another fall.   Your health conditions - Some health problems can put you at risk of falling. These include conditions that affect eyesight, hearing, muscle strength, or balance.   What to do if you are in the hospital - Falls can happen in the hospital because you are in an unfamiliar place. You might feel unsteady or drowsy from medicines or anesthesia. Or you might be attached to catheters or IV tubing that you could trip over. You are also likely to be weaker than usual.   Your medicines - Certain medicines can increase the risk of falling. These include some medicines for sleeping problems, anxiety, high blood pressure, or depression. Adding new medicines, or changing doses of some medicines, can  also affect your risk of falling.  The more your doctor knows about your situation, the better they can help you. For example, if you fell because you have a condition that causes pain, your doctor might suggest treatments to help with the pain. Or if any of your medicines are making you dizzy and more likely to fall, your doctor might switch you to a different medicine.  Is there anything I can do on my own? -- Yes. To help keep from falling, you can:   Make your home safer - To avoid falling at home, get rid of things that might make you trip or slip. This can include furniture, electrical cords, clutter, and loose rugs (figure 1). Keep your home well lit so you can easily see where you are going. Avoid storing things in high places so you don't have to reach or climb.   Wear non-slip socks or sturdy shoes that fit well - Wearing shoes with high heels or slippery soles, or shoes that are too loose, can lead to falls. Walking around in bare feet, or only socks, can also increase your risk of falling.   Take vitamin D pills - Taking vitamin D might lower the risk of falls in older people. This is because vitamin D helps make bones and muscles stronger. Your doctor can talk to you about whether you should take extra vitamin D, and how much.   Stay active - Moving your body regularly can help lower your risk of falling. It might also help prevent you from getting hurt if you do fall. It is best to do a few different activities that help with both strength and balance. There are many kinds of exercise that can be safe for older people. These include walking, swimming, and lauri chi (a Chinese martial art involving slow, gentle movements).   Use a cane, walker, or other safety devices - If your doctor recommends that you use a cane or walker, make sure that it's the right size and you know how to use it. There are other devices that might help you avoid falling, too. These include grab bars or a sturdy seat for the  shower, non-slip bath mats, and hand rails or treads for the stairs (to prevent slipping).   Take extra care if you have had surgery or are in the hospital - Ask for help with getting out of bed, getting up from a chair, or going to the bathroom. Your body needs time to heal, and it's normal to need help with these things while you recover. It can also help to keep your belongings within reach, and avoid walking around in the dark. If you need help, use the call button instead of trying to get up.  If you worry that you could fall, you can get an emergency alert system. This is usually an alarm button that lets you call for help if you fall and can't get up. If you live alone and you do not have an emergency alert system, always carry a cell phone or portable phone with you when moving around the house.  How do I get up from a fall? -- If you do fall, try not to be afraid. Stay calm, and take slow, deep breaths. If someone is near you, call for help right away. If you have an emergency alert system, use it.  Try to find out if you are hurt. If you are hurt badly, trying to get up can make things worse. If you do not think that you are hurt badly, come up with a plan to get up off of the floor.  Some tips to get up after a fall if you are alone:   Look around you for a piece of sturdy furniture such as a couch or chair. Try to move your body to the furniture. You might need to scoot, crawl, or roll to get close. Do these movements very slowly. If you feel any sharp pains, you might need to stop.   Once you are close to the furniture, roll onto your side. Pull your knees up toward your chest, and try to get on your hands and knees.   Put your hands on the seat of the couch or chair.   Bring 1 leg forward so the foot is flat on the floor. If you have a stronger leg, move this leg first.   Now, try to stand up. Once both feet are on the ground, slowly turn and lower yourself to sit down on the seat.  What should I do  after a fall? -- If you had a fall, see your doctor right away, even if you aren't hurt. Your doctor can try to figure out what caused you to fall, and how likely you are to fall again. They will do an exam and talk to you about your health problems, medicines, and activities. They can also check how well you walk, move, and balance. Then, they can suggest things you can do to lower your risk of falling again.  Many older people have a hard time recovering after a fall. Doing things to prevent falling can help you protect your health and independence.  All topics are updated as new evidence becomes available and our peer review process is complete.  This topic retrieved from [facility] [employer] [facility] on: Apr 04, 2024.  Topic 23167 Version 25.0  Release: 32.2.4 - C32.93  © 2024 UpToDate, Inc. and/or its affiliates. All rights reserved.  figure 1: How to avoid falling at home     This picture shows some of the things that can cause a fall in your home. Look around and remove any loose rugs, electrical cords, clutter, or furniture that could trip you.  Graphic 26698 Version 1.0  Consumer Information Use and Disclaimer   Disclaimer: This generalized information is a limited summary of diagnosis, treatment, and/or medication information. It is not meant to be comprehensive and should be used as a tool to help the user understand and/or assess potential diagnostic and treatment options. It does NOT include all information about conditions, treatments, medications, side effects, or risks that may apply to a specific patient. It is not intended to be medical advice or a substitute for the medical advice, diagnosis, or treatment of a health care provider based on the health care provider's examination and assessment of a patient's specific and unique circumstances. Patients must speak with a health care provider for complete information about their health, medical questions, and treatment options, including any risks or benefits regarding  use of medications. This information does not endorse any treatments or medications as safe, effective, or approved for treating a specific patient. UpToDate, Inc. and its affiliates disclaim any warranty or liability relating to this information or the use thereof.The use of this information is governed by the Terms of Use, available at https://www.[employer].com/en/know/clinical-effectiveness-terms. 2024© UpToDate, Inc. and its affiliates and/or licensors. All rights reserved.  Copyright   © 2024 UpToDate, Inc. and/or its affiliates. All rights reserved.

## 2025-01-09 NOTE — ASSESSMENT & PLAN NOTE
Weakness left lower extremity suspected nerve impingement with lumbar spondylosis myelopathy start physical therapy awaiting to be seen by pain management  Orders:  •  Ambulatory Referral to Physical Therapy; Future

## 2025-01-09 NOTE — PROGRESS NOTES
Problem: Adult Inpatient Plan of Care  Goal: Plan of Care Review  Outcome: Ongoing, Progressing  Goal: Patient-Specific Goal (Individualized)  Outcome: Ongoing, Progressing  Goal: Absence of Hospital-Acquired Illness or Injury  Outcome: Ongoing, Progressing  Goal: Optimal Comfort and Wellbeing  Outcome: Ongoing, Progressing  Goal: Readiness for Transition of Care  Outcome: Ongoing, Progressing     Problem: Diabetes Comorbidity  Goal: Blood Glucose Level Within Targeted Range  Outcome: Ongoing, Progressing     Problem: Fluid and Electrolyte Imbalance (Acute Kidney Injury/Impairment)  Goal: Fluid and Electrolyte Balance  Outcome: Ongoing, Progressing     Problem: Oral Intake Inadequate (Acute Kidney Injury/Impairment)  Goal: Optimal Nutrition Intake  Outcome: Ongoing, Progressing     Problem: Renal Function Impairment (Acute Kidney Injury/Impairment)  Goal: Effective Renal Function  Outcome: Ongoing, Progressing      Name: Sherrie Tovar      : 1950      MRN: [id number]  Encounter Provider: Devon Rodriguez MD  Encounter Date: 2025   Encounter department: [facility] INTERNAL MEDICINE  :  Assessment & Plan  Cervical radiculopathy  Pain cervical spine rating both upper extremity seen by pain management advised epidural injection patient refused awaiting to be reevaluated by pain management until then patient does not like to use anything else other than Tylenol to use as needed start physical therapy  Orders:  •  Ambulatory Referral to Physical Therapy; Future    Lumbar post-laminectomy syndrome      Pain lower back rating both extremity with some weakness left lower right left lower extremity weakness due to suspected nerve impingement causing high risk for the fall fell once 2 weeks ago advised to be seen by pain management been using Tylenol as needed does not like to use any of the medication awaiting to start physical therapy  Orders:  •  Ambulatory Referral to Physical Therapy; Future    Weakness of left lower extremity  Weakness left lower extremity suspected nerve impingement with lumbar spondylosis myelopathy start physical therapy awaiting to be seen by pain management  Orders:  •  Ambulatory Referral to Physical Therapy; Future    Vertigo  History of vertigo in the past had used meclizine in the past did not help restart vestibular therapy  Orders:  •  Ambulatory referral to Physical Therapy; Future    Mild protein-calorie malnutrition (HCC)  Counseling done to increase calorie protein intake patient's BMI at 17.68 nutritionist counseling done nutritionist consult patient refused advised Ensure Plus-can 3 times a day              History of Present Illness     Came in complaining of pain cervical spine rating both upper extremity and lower back pain rating both lower extremity weakness left lower extremity and persistent dizziness getting worse for details refer to assessment plan visit  diagnosis    Dizziness  Associated symptoms include arthralgias, fatigue, joint swelling, myalgias, nausea and neck pain. Pertinent negatives include no abdominal pain, chest pain, chills, congestion, coughing, diaphoresis, fever, headaches, numbness, rash, sore throat, vomiting or weakness.     Review of Systems   Constitutional:  Positive for activity change and fatigue. Negative for appetite change, chills, diaphoresis, fever and unexpected weight change.   HENT:  Negative for congestion, dental problem, drooling, ear discharge, ear pain, facial swelling, hearing loss, mouth sores, nosebleeds, postnasal drip, rhinorrhea, sinus pressure, sneezing, sore throat, tinnitus, trouble swallowing and voice change.    Eyes:  Negative for photophobia, pain, discharge, redness, itching and visual disturbance.   Respiratory:  Negative for apnea, cough, choking, chest tightness, shortness of breath, wheezing and stridor.    Cardiovascular:  Negative for chest pain, palpitations and leg swelling.   Gastrointestinal:  Positive for nausea. Negative for abdominal distention, abdominal pain, anal bleeding, blood in stool, constipation, diarrhea, rectal pain and vomiting.   Endocrine: Negative for cold intolerance, heat intolerance, polydipsia, polyphagia and polyuria.   Genitourinary:  Negative for decreased urine volume, difficulty urinating, dysuria, enuresis, flank pain, frequency, genital sores, hematuria and urgency.   Musculoskeletal:  Positive for arthralgias, back pain, gait problem, joint swelling, myalgias and neck pain. Negative for neck stiffness.   Skin:  Negative for color change, pallor, rash and wound.   Allergic/Immunologic: Negative.  Negative for environmental allergies, food allergies and immunocompromised state.   Neurological:  Positive for dizziness. Negative for tremors, seizures, syncope, facial asymmetry, speech difficulty, weakness, light-headedness, numbness and headaches.   Psychiatric/Behavioral:   "Negative for agitation, behavioral problems, confusion, decreased concentration, dysphoric mood, hallucinations, self-injury, sleep disturbance and suicidal ideas. The patient is not nervous/anxious and is not hyperactive.        Objective   /64   Pulse 77   Ht 5' 3\" (1.6 m)   Wt 45.3 kg (99 lb 12.8 oz)   SpO2 98%   BMI 17.68 kg/m²      Physical Exam  Vitals and nursing note reviewed.   Constitutional:       General: She is not in acute distress.     Appearance: She is well-developed. She is ill-appearing.      Comments: Malnourished   HENT:      Head: Normocephalic and atraumatic.   Eyes:      Conjunctiva/sclera: Conjunctivae normal.   Cardiovascular:      Rate and Rhythm: Normal rate and regular rhythm.      Heart sounds: No murmur heard.  Pulmonary:      Effort: Pulmonary effort is normal. No respiratory distress.   Abdominal:      Palpations: Abdomen is soft.      Tenderness: There is no abdominal tenderness.   Musculoskeletal:         General: No swelling.      Cervical back: Neck supple.   Skin:     General: Skin is warm and dry.      Capillary Refill: Capillary refill takes less than 2 seconds.   Neurological:      Mental Status: She is alert and oriented to person, place, and time.      Gait: Gait abnormal.   Psychiatric:         Mood and Affect: Mood normal.         "

## 2025-01-09 NOTE — ASSESSMENT & PLAN NOTE
Counseling done to increase calorie protein intake patient's BMI at 17.68 nutritionist counseling done nutritionist consult patient refused advised Ensure Plus-can 3 times a day

## 2025-01-09 NOTE — ASSESSMENT & PLAN NOTE
Pain lower back rating both extremity with some weakness left lower right left lower extremity weakness due to suspected nerve impingement causing high risk for the fall fell once 2 weeks ago advised to be seen by pain management been using Tylenol as needed does not like to use any of the medication awaiting to start physical therapy  Orders:  •  Ambulatory Referral to Physical Therapy; Future

## 2025-02-12 ENCOUNTER — OFFICE VISIT (OUTPATIENT)
Dept: INTERNAL MEDICINE CLINIC | Facility: CLINIC | Age: 75
End: 2025-02-12
Payer: COMMERCIAL

## 2025-02-12 VITALS
WEIGHT: 101 LBS | HEART RATE: 71 BPM | TEMPERATURE: 97.9 F | OXYGEN SATURATION: 96 % | BODY MASS INDEX: 17.89 KG/M2 | HEIGHT: 63 IN

## 2025-02-12 DIAGNOSIS — R00.1 BRADYCARDIA, UNSPECIFIED: ICD-10-CM

## 2025-02-12 DIAGNOSIS — E64.0 SEQUELAE OF PROTEIN-CALORIE MALNUTRITION (HCC): ICD-10-CM

## 2025-02-12 DIAGNOSIS — R27.9 UNSPECIFIED LACK OF COORDINATION: ICD-10-CM

## 2025-02-12 DIAGNOSIS — E55.9 VITAMIN D DEFICIENCY: ICD-10-CM

## 2025-02-12 DIAGNOSIS — R42 DIZZINESS: Primary | ICD-10-CM

## 2025-02-12 PROCEDURE — G2211 COMPLEX E/M VISIT ADD ON: HCPCS

## 2025-02-12 PROCEDURE — 99214 OFFICE O/P EST MOD 30 MIN: CPT

## 2025-02-12 NOTE — PROGRESS NOTES
Name: Sherrie Tovar      : 1950      MRN: [id number]  Encounter Provider: Renee Wallace MD  Encounter Date: 2025   Encounter department: [facility]  :  Assessment & Plan  Dizziness  Will check for possible etiology with metabolic abnormalities  Consider follow-up with ENT  Orders:    Vitamin D 25 hydroxy; Future    Folate; Future    Iron Panel (Includes Ferritin, Iron Sat%, Iron, and TIBC); Future    TSH, 3rd generation with Free T4 reflex; Future    Magnesium; Future    Lipid panel; Future    Basic metabolic panel; Future    Hepatic function panel; Future    Vitamin D deficiency    Orders:    Vitamin D 25 hydroxy; Future    Sequelae of protein-calorie malnutrition (HCC)    Orders:    Folate; Future    Lipid panel; Future    Bradycardia, unspecified    Orders:    Iron Panel (Includes Ferritin, Iron Sat%, Iron, and TIBC); Future    Unspecified lack of coordination    Orders:    TSH, 3rd generation with Free T4 reflex; Future    Return in about 1 week (around 2025) for Recheck labs.  #       History of Present Illness   HPI  Patient returns for follow-up visit for persistent symptoms of dizziness, weakness, poor coordination, chronic back pain and abdominal pain--- attributed to DDD w/ radiculopathy and ovarian cysts, respectively.    She does follow with cardiology and ENT for a diagnosis of RVOT and vertigo, respectively.     Has noted some improvement in the past with physical therapy, however she had to stop going due to limited insurance coverage.  She notes gradual worsening of her symptoms after this.     In addition, following her fall in 2024--she always feels cold.     She continues to have lower abdominal cramping pain--- unchanged from previous thought to be due to her ovarian cysts.    Also continues to be frustrated with history of collapsed bladder with increased frequency in urination--- she has 2 urinate every 2 hours and her sleep is  "impaired due to this.    She notes a change in her bowel habits--- has had explosive loose stool lately--but denies any change in her diet.  She has been evaluated by GI in the past for similar symptoms.    Unknown history of dexa scan--- consider ordering in the future       Review of Systems   Constitutional:  Negative for activity change, chills and fever.   HENT:  Positive for hearing loss and tinnitus. Negative for congestion, ear pain, sinus pressure and sinus pain.    Eyes:  Negative for visual disturbance.   Respiratory:  Negative for cough and shortness of breath.    Cardiovascular:  Negative for chest pain and palpitations.   Gastrointestinal:  Positive for diarrhea. Negative for abdominal pain, nausea and vomiting.   Endocrine: Positive for cold intolerance.   Genitourinary:  Positive for frequency and urgency. Negative for difficulty urinating, dysuria, flank pain and hematuria.   Musculoskeletal:  Positive for arthralgias, back pain and gait problem.   Neurological:  Positive for dizziness, weakness and light-headedness. Negative for headaches.   Psychiatric/Behavioral:  Negative for confusion.        Objective   Pulse 71   Temp 97.9 °F (36.6 °C)   Ht 5' 3\" (1.6 m)   Wt 45.8 kg (101 lb)   SpO2 96%   BMI 17.89 kg/m²      Physical Exam  Vitals and nursing note reviewed.   Constitutional:       General: She is not in acute distress.     Appearance: She is not ill-appearing or toxic-appearing.   HENT:      Head: Atraumatic.   Eyes:      General:         Right eye: No discharge.         Left eye: No discharge.      Extraocular Movements: Extraocular movements intact.      Conjunctiva/sclera: Conjunctivae normal.   Cardiovascular:      Rate and Rhythm: Normal rate and regular rhythm.      Pulses: Normal pulses.      Heart sounds: Normal heart sounds.   Pulmonary:      Effort: Pulmonary effort is normal. No respiratory distress.      Breath sounds: Normal breath sounds.   Musculoskeletal:         General: " Normal range of motion.      Cervical back: Normal range of motion.   Skin:     General: Skin is warm and dry.   Neurological:      Mental Status: She is alert and oriented to person, place, and time.   Psychiatric:         Behavior: Behavior normal.

## 2025-02-13 ENCOUNTER — TELEPHONE (OUTPATIENT)
Age: 75
End: 2025-02-13

## 2025-02-13 NOTE — TELEPHONE ENCOUNTER
Patient called to ask if she needed to fast prior to bloodwork and it appears that she does. She has difficulty in the morning with dizziness and has to eat before taking heart medicine so she just wanted to ask doctor if it was mandatory so she can prepare. Please advise.

## 2025-02-14 ENCOUNTER — APPOINTMENT (OUTPATIENT)
Dept: LAB | Facility: CLINIC | Age: 75
End: 2025-02-14
Payer: COMMERCIAL

## 2025-02-14 DIAGNOSIS — E55.9 VITAMIN D DEFICIENCY: ICD-10-CM

## 2025-02-14 DIAGNOSIS — R42 DIZZINESS: ICD-10-CM

## 2025-02-14 DIAGNOSIS — E64.0 SEQUELAE OF PROTEIN-CALORIE MALNUTRITION (HCC): ICD-10-CM

## 2025-02-14 DIAGNOSIS — R00.1 BRADYCARDIA, UNSPECIFIED: ICD-10-CM

## 2025-02-14 DIAGNOSIS — R27.9 UNSPECIFIED LACK OF COORDINATION: ICD-10-CM

## 2025-02-14 LAB
25(OH)D3 SERPL-MCNC: 10.9 NG/ML (ref 30–100)
ALBUMIN SERPL BCG-MCNC: 4.4 G/DL (ref 3.5–5)
ALP SERPL-CCNC: 57 U/L (ref 34–104)
ALT SERPL W P-5'-P-CCNC: 10 U/L (ref 7–52)
ANION GAP SERPL CALCULATED.3IONS-SCNC: 9 MMOL/L (ref 4–13)
AST SERPL W P-5'-P-CCNC: 18 U/L (ref 13–39)
BILIRUB DIRECT SERPL-MCNC: 0.15 MG/DL (ref 0–0.2)
BILIRUB SERPL-MCNC: 0.84 MG/DL (ref 0.2–1)
BUN SERPL-MCNC: 10 MG/DL (ref 5–25)
CALCIUM SERPL-MCNC: 9.3 MG/DL (ref 8.4–10.2)
CHLORIDE SERPL-SCNC: 99 MMOL/L (ref 96–108)
CHOLEST SERPL-MCNC: 199 MG/DL (ref ?–200)
CO2 SERPL-SCNC: 27 MMOL/L (ref 21–32)
CREAT SERPL-MCNC: 0.53 MG/DL (ref 0.6–1.3)
FERRITIN SERPL-MCNC: 17 NG/ML (ref 11–307)
FOLATE SERPL-MCNC: 7 NG/ML
GFR SERPL CREATININE-BSD FRML MDRD: 92 ML/MIN/1.73SQ M
GLUCOSE P FAST SERPL-MCNC: 90 MG/DL (ref 65–99)
HDLC SERPL-MCNC: 72 MG/DL
IRON SATN MFR SERPL: 36 % (ref 15–50)
IRON SERPL-MCNC: 160 UG/DL (ref 50–212)
LDLC SERPL CALC-MCNC: 109 MG/DL (ref 0–100)
MAGNESIUM SERPL-MCNC: 1.7 MG/DL (ref 1.9–2.7)
NONHDLC SERPL-MCNC: 127 MG/DL
POTASSIUM SERPL-SCNC: 4.1 MMOL/L (ref 3.5–5.3)
PROT SERPL-MCNC: 6.6 G/DL (ref 6.4–8.4)
SODIUM SERPL-SCNC: 135 MMOL/L (ref 135–147)
TIBC SERPL-MCNC: 442.4 UG/DL (ref 250–450)
TRANSFERRIN SERPL-MCNC: 316 MG/DL (ref 203–362)
TRIGL SERPL-MCNC: 90 MG/DL (ref ?–150)
TSH SERPL DL<=0.05 MIU/L-ACNC: 0.64 UIU/ML (ref 0.45–4.5)
UIBC SERPL-MCNC: 282 UG/DL (ref 155–355)

## 2025-02-14 PROCEDURE — 82306 VITAMIN D 25 HYDROXY: CPT

## 2025-02-14 PROCEDURE — 82746 ASSAY OF FOLIC ACID SERUM: CPT

## 2025-02-14 PROCEDURE — 83735 ASSAY OF MAGNESIUM: CPT

## 2025-02-14 PROCEDURE — 83540 ASSAY OF IRON: CPT

## 2025-02-14 PROCEDURE — 84443 ASSAY THYROID STIM HORMONE: CPT

## 2025-02-14 PROCEDURE — 80061 LIPID PANEL: CPT

## 2025-02-14 PROCEDURE — 83550 IRON BINDING TEST: CPT

## 2025-02-14 PROCEDURE — 80048 BASIC METABOLIC PNL TOTAL CA: CPT

## 2025-02-14 PROCEDURE — 82728 ASSAY OF FERRITIN: CPT

## 2025-02-14 PROCEDURE — 36415 COLL VENOUS BLD VENIPUNCTURE: CPT

## 2025-02-14 PROCEDURE — 80076 HEPATIC FUNCTION PANEL: CPT

## 2025-02-15 ENCOUNTER — RA CDI HCC (OUTPATIENT)
Dept: OTHER | Facility: HOSPITAL | Age: 75
End: 2025-02-15

## 2025-02-19 ENCOUNTER — OFFICE VISIT (OUTPATIENT)
Dept: INTERNAL MEDICINE CLINIC | Facility: CLINIC | Age: 75
End: 2025-02-19
Payer: COMMERCIAL

## 2025-02-19 VITALS
HEIGHT: 63 IN | SYSTOLIC BLOOD PRESSURE: 112 MMHG | HEART RATE: 78 BPM | DIASTOLIC BLOOD PRESSURE: 60 MMHG | OXYGEN SATURATION: 96 % | BODY MASS INDEX: 18.25 KG/M2 | WEIGHT: 103 LBS

## 2025-02-19 DIAGNOSIS — M25.552 LEFT HIP PAIN: ICD-10-CM

## 2025-02-19 DIAGNOSIS — E53.8 LOW FOLATE: ICD-10-CM

## 2025-02-19 DIAGNOSIS — E55.9 VITAMIN D DEFICIENCY: ICD-10-CM

## 2025-02-19 DIAGNOSIS — R32 URINARY INCONTINENCE, UNSPECIFIED TYPE: ICD-10-CM

## 2025-02-19 DIAGNOSIS — R10.31 RLQ ABDOMINAL PAIN: ICD-10-CM

## 2025-02-19 DIAGNOSIS — N32.9 BLADDER DISORDER: ICD-10-CM

## 2025-02-19 DIAGNOSIS — E83.42 HYPOMAGNESEMIA: ICD-10-CM

## 2025-02-19 DIAGNOSIS — R35.0 URINARY FREQUENCY: ICD-10-CM

## 2025-02-19 DIAGNOSIS — E78.00 PURE HYPERCHOLESTEROLEMIA: ICD-10-CM

## 2025-02-19 DIAGNOSIS — R27.9 UNSPECIFIED LACK OF COORDINATION: ICD-10-CM

## 2025-02-19 DIAGNOSIS — R20.2 NUMBNESS AND TINGLING IN LEFT ARM: ICD-10-CM

## 2025-02-19 DIAGNOSIS — R20.0 NUMBNESS AND TINGLING IN LEFT ARM: ICD-10-CM

## 2025-02-19 DIAGNOSIS — R42 DIZZINESS: Primary | ICD-10-CM

## 2025-02-19 DIAGNOSIS — M79.602 LEFT ARM PAIN: ICD-10-CM

## 2025-02-19 LAB
SL AMB  POCT GLUCOSE, UA: NORMAL
SL AMB LEUKOCYTE ESTERASE,UA: NORMAL
SL AMB POCT BILIRUBIN,UA: NORMAL
SL AMB POCT BLOOD,UA: NORMAL
SL AMB POCT CLARITY,UA: CLEAR
SL AMB POCT COLOR,UA: NORMAL
SL AMB POCT KETONES,UA: NORMAL
SL AMB POCT NITRITE,UA: NORMAL
SL AMB POCT PH,UA: 6.5
SL AMB POCT SPECIFIC GRAVITY,UA: 1
SL AMB POCT URINE PROTEIN: NORMAL
SL AMB POCT UROBILINOGEN: 0.2

## 2025-02-19 PROCEDURE — G2211 COMPLEX E/M VISIT ADD ON: HCPCS

## 2025-02-19 PROCEDURE — 99214 OFFICE O/P EST MOD 30 MIN: CPT

## 2025-02-19 PROCEDURE — 81002 URINALYSIS NONAUTO W/O SCOPE: CPT

## 2025-02-19 RX ORDER — FOLIC ACID 1 MG/1
1 TABLET ORAL DAILY
Qty: 90 TABLET | Refills: 1 | Status: SHIPPED | OUTPATIENT
Start: 2025-02-19

## 2025-02-19 RX ORDER — ERGOCALCIFEROL 1.25 MG/1
50000 CAPSULE, LIQUID FILLED ORAL WEEKLY
Qty: 12 CAPSULE | Refills: 0 | Status: SHIPPED | OUTPATIENT
Start: 2025-02-19

## 2025-02-19 NOTE — PATIENT INSTRUCTIONS
- Start taking vitamin D 2,000 IU daily after completing the 12 weeks of prescribed high dose vitamin D

## 2025-02-25 ENCOUNTER — HOSPITAL ENCOUNTER (OUTPATIENT)
Dept: RADIOLOGY | Facility: HOSPITAL | Age: 75
Discharge: HOME | End: 2025-02-25
Payer: COMMERCIAL

## 2025-02-25 DIAGNOSIS — R10.31 RLQ ABDOMINAL PAIN: ICD-10-CM

## 2025-02-25 PROCEDURE — 76856 US EXAM PELVIC COMPLETE: CPT

## 2025-02-28 ENCOUNTER — RESULTS FOLLOW-UP (OUTPATIENT)
Dept: INTERNAL MEDICINE CLINIC | Facility: CLINIC | Age: 75
End: 2025-02-28

## 2025-03-03 ENCOUNTER — HOSPITAL ENCOUNTER (OUTPATIENT)
Dept: RADIOLOGY | Facility: HOSPITAL | Age: 75
Discharge: HOME | End: 2025-03-03
Payer: COMMERCIAL

## 2025-03-03 DIAGNOSIS — M79.602 LEFT ARM PAIN: ICD-10-CM

## 2025-03-03 DIAGNOSIS — R20.2 NUMBNESS AND TINGLING IN LEFT ARM: ICD-10-CM

## 2025-03-03 DIAGNOSIS — R20.0 NUMBNESS AND TINGLING IN LEFT ARM: ICD-10-CM

## 2025-03-03 DIAGNOSIS — M25.552 LEFT HIP PAIN: ICD-10-CM

## 2025-03-03 PROCEDURE — 73502 X-RAY EXAM HIP UNI 2-3 VIEWS: CPT

## 2025-03-03 PROCEDURE — 73030 X-RAY EXAM OF SHOULDER: CPT

## 2025-03-03 PROCEDURE — 72050 X-RAY EXAM NECK SPINE 4/5VWS: CPT

## 2025-03-04 ENCOUNTER — RESULTS FOLLOW-UP (OUTPATIENT)
Dept: INTERNAL MEDICINE CLINIC | Facility: CLINIC | Age: 75
End: 2025-03-04

## 2025-03-07 ENCOUNTER — OFFICE VISIT (OUTPATIENT)
Dept: INTERNAL MEDICINE CLINIC | Facility: CLINIC | Age: 75
End: 2025-03-07
Payer: COMMERCIAL

## 2025-03-07 VITALS
OXYGEN SATURATION: 96 % | WEIGHT: 101 LBS | DIASTOLIC BLOOD PRESSURE: 82 MMHG | BODY MASS INDEX: 17.89 KG/M2 | HEIGHT: 63 IN | SYSTOLIC BLOOD PRESSURE: 120 MMHG | HEART RATE: 80 BPM

## 2025-03-07 DIAGNOSIS — M54.16 LUMBAR RADICULOPATHY: ICD-10-CM

## 2025-03-07 DIAGNOSIS — M96.1 LUMBAR POST-LAMINECTOMY SYNDROME: ICD-10-CM

## 2025-03-07 DIAGNOSIS — M54.42 CHRONIC BILATERAL LOW BACK PAIN WITH LEFT-SIDED SCIATICA: ICD-10-CM

## 2025-03-07 DIAGNOSIS — G89.29 CHRONIC BILATERAL LOW BACK PAIN WITH LEFT-SIDED SCIATICA: ICD-10-CM

## 2025-03-07 DIAGNOSIS — M54.12 CERVICAL RADICULOPATHY: Primary | ICD-10-CM

## 2025-03-07 PROCEDURE — G2211 COMPLEX E/M VISIT ADD ON: HCPCS

## 2025-03-07 PROCEDURE — 99213 OFFICE O/P EST LOW 20 MIN: CPT

## 2025-03-07 NOTE — PROGRESS NOTES
Name: Sherrie Tovar      : 1950      MRN: [id number]  Encounter Provider: Renee Wallace MD  Encounter Date: 3/7/2025   Encounter department: [facility]  :  Assessment & Plan  Cervical radiculopathy  XR C spine redemonstrated mild spondylosis with osteophyte formation at C4-C5---   This was detected on an MRI completed several years prior  She follows with a pain management specialist  XR Left shoulder was normal  Orders:  •  Ambulatory referral to Orthopedic Surgery; Future    Lumbar radiculopathy  XR left hip/pelvis revealed mild degenerative changes with osteophytosis in L-spine demonstrates mild spondylotic changes  Continue following with pain management specialist--takes Tylenol with codeine with no significant improvement of her pain  Recommend re-evaluation by orthopedic specialist for additional recommendations, if warranted  Orders:  •  Ambulatory referral to Orthopedic Surgery; Future    Chronic bilateral low back pain with left-sided sciatica  Plan noted above  Orders:  •  Ambulatory referral to Orthopedic Surgery; Future    Lumbar post-laminectomy syndrome    Orders:  •  Ambulatory referral to Orthopedic Surgery; Future    Return for Next scheduled follow up.       History of Present Illness     HPI  Patient returns for follow-up visit to review recent imaging studies for persistent MSK and abdominal pain complaints.    Today, she reports persistence of her pain.  She does follow with a pain management specialist--has been taking Tylenol-codeine, without sufficient pain relief.     Review of Systems   Constitutional:  Positive for fatigue. Negative for chills and fever.   HENT:  Negative for congestion.    Eyes:  Negative for visual disturbance.   Respiratory:  Negative for shortness of breath.    Cardiovascular:  Negative for chest pain and palpitations.   Gastrointestinal:  Positive for abdominal pain. Negative for diarrhea, nausea and vomiting.  "  Genitourinary:  Positive for frequency and urgency.   Musculoskeletal:  Positive for arthralgias, back pain, gait problem and myalgias.   Neurological:  Positive for dizziness and weakness. Negative for syncope, speech difficulty, numbness and headaches.   Psychiatric/Behavioral:  Negative for confusion. The patient is nervous/anxious.        Objective   /82   Pulse 80   Ht 5' 3\" (1.6 m)   Wt 45.8 kg (101 lb)   SpO2 96%   BMI 17.89 kg/m²      Physical Exam  Vitals and nursing note reviewed.   Constitutional:       General: She is not in acute distress.     Appearance: She is not ill-appearing or toxic-appearing.   HENT:      Head: Atraumatic.   Eyes:      General:         Right eye: No discharge.         Left eye: No discharge.      Extraocular Movements: Extraocular movements intact.      Conjunctiva/sclera: Conjunctivae normal.   Cardiovascular:      Rate and Rhythm: Normal rate.      Pulses: Normal pulses.   Pulmonary:      Effort: Pulmonary effort is normal. No respiratory distress.   Musculoskeletal:         General: Normal range of motion.      Cervical back: Normal range of motion.   Skin:     General: Skin is warm and dry.   Neurological:      Mental Status: She is alert and oriented to person, place, and time.      Coordination: Coordination abnormal.      Gait: Gait abnormal.   Psychiatric:         Behavior: Behavior normal.         "

## 2025-03-07 NOTE — ASSESSMENT & PLAN NOTE
XR left hip/pelvis revealed mild degenerative changes with osteophytosis in L-spine demonstrates mild spondylotic changes  Continue following with pain management specialist--takes Tylenol with codeine with no significant improvement of her pain  Recommend re-evaluation by orthopedic specialist for additional recommendations, if warranted  Orders:  •  Ambulatory referral to Orthopedic Surgery; Future

## 2025-03-07 NOTE — ASSESSMENT & PLAN NOTE
XR C spine redemonstrated mild spondylosis with osteophyte formation at C4-C5---   This was detected on an MRI completed several years prior  She follows with a pain management specialist  XR Left shoulder was normal  Orders:  •  Ambulatory referral to Orthopedic Surgery; Future

## 2025-03-17 ENCOUNTER — HOSPITAL ENCOUNTER (OUTPATIENT)
Dept: RADIOLOGY | Facility: HOSPITAL | Age: 75
Discharge: HOME | End: 2025-03-17
Payer: COMMERCIAL

## 2025-03-17 DIAGNOSIS — M54.6 PAIN IN THORACIC SPINE: ICD-10-CM

## 2025-03-17 PROCEDURE — 72072 X-RAY EXAM THORAC SPINE 3VWS: CPT

## 2025-03-27 ENCOUNTER — TELEPHONE (OUTPATIENT)
Age: 75
End: 2025-03-27

## 2025-03-27 NOTE — TELEPHONE ENCOUNTER
Pt called in to schedule a np appt for dizziness. Pt was recommended to see . Pt is dealing with dizziness that she has had for 3 years. Pt was diagnosed with vertigo and tenitis. In October pt had a dizzy spell and fell and hit her forehead. Since the fall pt has had the right side back of head. The doctor at the time that evaluated her did not think pt had concussion. Since the fall pt every once in a while does get nauseous, pt is not sensitive to light or sound, increased balance issues/loss of coordination, pt is not having any memory impairment issues and no vision changes ( slight change but pt stated that is due to age). Pt stated that the dizzy spells have increased and is nervous to wait till the scheduled appt. Pt is scheduled with  on 06/17/2025 and is on the wait list.

## 2025-03-31 NOTE — TELEPHONE ENCOUNTER
Call made to patient to offer sooner appointment-    Patient graciously accepted 4/15/25 at 1:30 pm with Dr. Devine and was very appreciative af the sooner appointment.

## 2025-04-15 ENCOUNTER — OFFICE VISIT (OUTPATIENT)
Age: 75
End: 2025-04-15
Payer: COMMERCIAL

## 2025-04-15 VITALS
DIASTOLIC BLOOD PRESSURE: 72 MMHG | WEIGHT: 99 LBS | SYSTOLIC BLOOD PRESSURE: 104 MMHG | HEART RATE: 65 BPM | HEIGHT: 63 IN | OXYGEN SATURATION: 99 % | BODY MASS INDEX: 17.54 KG/M2

## 2025-04-15 DIAGNOSIS — H93.19 TINNITUS: ICD-10-CM

## 2025-04-15 DIAGNOSIS — Q20.8 ABNORMALITY OF RIGHT VENTRICULAR OUTFLOW TRACT: ICD-10-CM

## 2025-04-15 DIAGNOSIS — R42 DIZZINESS AND GIDDINESS: Primary | ICD-10-CM

## 2025-04-15 PROCEDURE — 99205 OFFICE O/P NEW HI 60 MIN: CPT

## 2025-04-15 RX ORDER — ACETAMINOPHEN AND CODEINE PHOSPHATE 300; 60 MG/1; MG/1
1 TABLET ORAL EVERY 6 HOURS PRN
COMMUNITY
Start: 2025-03-28

## 2025-04-15 RX ORDER — METOPROLOL TARTRATE 50 MG/1
25 TABLET ORAL 3 TIMES DAILY
COMMUNITY
Start: 2025-02-25

## 2025-04-15 NOTE — PROGRESS NOTES
"Name: Sherrie Tovar      : 1950      MRN: [id number]  Encounter Provider: Rafael Devine MD  Encounter Date: 4/15/2025   Encounter department: [facility]  :  Assessment & Plan  Dizziness and giddiness    Orders:    Tilt table; Future    MRI brain with and without contrast; Future    Tinnitus         Patient is a 74 year old female with PMH of HTN, RVOT (right ventricular outflow tract) obstruction who presents for evaluation of dizziness.    She has had dizziness for many years. By dizzy, she means \"unsteady\" on her feet. Her symptoms have gotten worse in the past 6 months. Symptoms are worse when she is sitting up or standing after lying down. Denies loss of consciousness or shaking. Denies problems with speech or focal weakness/numbness during dizziness.     She has hearing loss and bilateral tinnitus.  She follows up with an ENT, who reportedly ruled out inner ear pathology. She follows up with a cardiologist (Dr. Scherer in [place], [place]) who she will go back to see next month.     She goes to physical therapy, which is helping with her balance. Today, orthostatics are normal and hieu-hallpike maneuver is negative.     2022 CTA head and neck shows normal intracranial vasculature.    Etiology of patient's dizziness/unsteadiness is possible related to her cardiologic diagnosis. We will rule out any intracranial lesions, that may cause dizziness.     Plan:  - Ordered MRI Brain with and without contrast  - Ordered tilt table testing  - Continue physical therapy  - Patient urged to bring notes from cardiology and ENT on next visits (both providers are out of [facility] network)  - Continue follow up with cardiology and ENT  - Follow up in 3 months    Abnormality of right ventricular outflow tract               History of Present Illness   HPI     Sherrie Tovar is a 74 year old female with PMH of HTN, RVOT who presents for evaluation of dizziness.    She has had dizziness " "for many years. By dizzy, she means \"unsteady\" on her feet. Her symptoms have gotten worse in the past 6 months. Symptoms are worse when she is sitting up or standing after lying down. It will take her a few minutes to acclimatize. Denies loss of consciousness or shaking. Denies problems with speech or focal weakness/numbness during dizziness.     She has headaches rarely.     She has hearing loss and bilateral tinnitus. She has right sided ear ache. She follows up with an ENT, who reportedly ruled out inner ear pathology.     She follows up with a cardiologist (Dr. Scherer in [place], [place]) who she will go back to see next monht.    She goes to physical therapy, which is helping with her balance. Today, orthostatics are normal and hieu-hallpike maneuver is negative.     8/2022 CTA head and neck shows normal intracranial vasculature.    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 Negative for appetite change and fever.   HENT:  Negative for hearing loss, tinnitus, trouble swallowing and voice change.    Eyes:  Positive for visual disturbance. Negative for photophobia and pain.   Respiratory: Negative.  Negative for shortness of breath.    Cardiovascular: Negative.  Negative for palpitations.   Gastrointestinal: Negative.  Negative for nausea and vomiting.   Endocrine: Positive for cold intolerance.   Genitourinary: Negative.  Negative for dysuria, frequency and urgency.   Musculoskeletal:  Positive for back pain, gait problem, neck pain and neck stiffness. Negative for myalgias.   Skin: Negative.  Negative for rash.   Allergic/Immunologic: Negative.    Neurological:  Positive for dizziness, weakness, light-headedness and numbness. Negative for tremors, seizures, syncope, facial asymmetry, speech difficulty and headaches.        \"Off balance\"   Hematological:  " "Bruises/bleeds easily.   Psychiatric/Behavioral:  Positive for sleep disturbance. Negative for confusion and hallucinations.         Sleep issue due to frequent urination    I have personally reviewed the MA's review of systems and made changes as necessary.       Objective   There were no vitals taken for this visit.    Physical Exam  Neurological Exam    /72 (Patient Position: Standing, Cuff Size: Adult)   Pulse 65   Ht 5' 3\" (1.6 m)   Wt 44.9 kg (99 lb)   SpO2 99%   BMI 17.54 kg/m²      General Exam  General: well developed, no acute distress.  HEENT: mucous membranes moist, anicteric sclera.   Neck: supple, good ROM.      Neurological Exam  Mental Status: awake, alert, and fully oriented to person, place, time, and situation. Attention and memory intact. Fund of knowledge is appropriate for age and education.  There is no neglect.    *[place]-Hallpike maneuver is negative     Language: fluency, and comprehension normal.       Cranial Nerves: Pupils equal and reactive to light.  Visual fields full to confrontation. Extraocular motions intact with full versions, normal pursuits and saccades. Facial strength full and symmetric. Facial sensation intact in V1-V3. Hearing intact to voice. Tongue protrudes to midline. Palate elevates symmetrically. Speech clear without notable dysarthria. Shoulder shrug activation full and symmetric.    Motor: Normal bulk and tone. No pronator drift. Strength is 5/5 proximally and distally in all 4 extremities. No involuntary movements.    Sensory: Sensation intact to light touch distally in all extremities.    Coordination: Normal finger-to-nose. Normal rapid alternating movements.     Station and gait: Casual and tandem gait normal. Normal Romberg.    Reflexes: Reflexes 1+ throughout and symmetric.     Radiology Results Review: I have reviewed radiology reports from   including: CT A .      CTA NECK AND BRAIN WITH AND WITHOUT CONTRAST     INDICATION: Vertigo, " central  Dizziness, dehydration or hypotension  Dizziness     COMPARISON:   Routine CT of the neck dated 3/29/2013     TECHNIQUE:  Routine CT imaging of the Brain without contrast.  Post contrast imaging was performed after administration of iodinated contrast through the neck and brain. Post contrast axial 0.625 mm images timed to opacify the arterial system.       3D rendering was performed on an independent workstation.   MIP reconstructions performed. Coronal reconstructions were performed of the noncontrast portion of the brain.       Radiation dose length product (DLP) for this visit:  1243.8 mGy-cm .  This examination, like all CT scans performed in the [facility], was performed utilizing techniques to minimize radiation dose exposure, including the use of iterative   reconstruction and automated exposure control.        IV Contrast:  65 mL of iohexol (OMNIPAQUE)     IMAGE QUALITY:   Diagnostic     FINDINGS:  NONCONTRAST BRAIN  PARENCHYMA:  No intracranial mass, mass effect or midline shift. No CT signs of acute infarction.  No acute parenchymal hemorrhage.     VENTRICLES AND EXTRA-AXIAL SPACES:  Normal for the patient's age.     VISUALIZED ORBITS AND PARANASAL SINUSES:  Unremarkable.     CERVICAL VASCULATURE  AORTIC ARCH AND GREAT VESSELS:  Moderate atherosclerotic calcification of the aortic arch extending into the great vessels.  There is no stenosis at the origin of the right brachiocephalic.  There is no stenosis of the right subclavian or right common   carotid artery origins.  The left common carotid artery origin demonstrates approximately 50% stenosis.  The left subclavian origin demonstrates extensive calcification with severe stenosis present, difficult to accurately measure but likely in the range   of 80-90%.     RIGHT VERTEBRAL ARTERY CERVICAL SEGMENT:  Normal origin. The vessel is normal in caliber throughout the neck.     LEFT VERTEBRAL ARTERY CERVICAL SEGMENT:  Normal origin.  The vessel is normal in caliber throughout the neck.     RIGHT EXTRACRANIAL CAROTID SEGMENT:  Mild eccentric calcification of the distal common carotid artery.  There is mild focal eccentric calcification of the medial aspect of the internal carotid artery bulb.  No stenosis or ulceration.     LEFT EXTRACRANIAL CAROTID SEGMENT:  Eccentric calcification of the lateral aspect of the distal common carotid artery.  No stenosis.  Normal ICA origin.     NASCET criteria was used to determine the degree of internal carotid artery diameter stenosis.        INTRACRANIAL VASCULATURE   INTERNAL CAROTID ARTERIES:  Normal enhancement of the intracranial portions of the internal carotid arteries.  Normal ophthalmic artery origins.  Normal ICA terminus.      ANTERIOR CIRCULATION:  Symmetric A1 segments and anterior cerebral arteries with normal enhancement.  Normal anterior communicating artery.     MIDDLE CEREBRAL ARTERY CIRCULATION:  M1 segment and middle cerebral artery branches demonstrate normal enhancement bilaterally.     DISTAL VERTEBRAL ARTERIES:  Distal vertebral arteries are hypoplastic as they extend through the foramen magnum with mild tortuosity.  No stenosis or occlusion.     BASILAR ARTERY:  Hypoplastic basilar artery which is consistent in caliber.  No occlusion.     POSTERIOR CEREBRAL ARTERIES: Both posterior cerebral arteries arise from the internal carotid arteries consistent with a fetal origin.  No focal stenosis is identified.   Hypoplastic P1 segments bilaterally.     VENOUS STRUCTURES:  Normal.        NON VASCULAR ANATOMY  BONY STRUCTURES:  No acute osseous abnormality.     SOFT TISSUES OF THE NECK: There is a large heterogeneous, primarily hypodense nodule within the right lobe of the thyroid gland measuring 3.3 cm in greatest dimension.  Normal left lobe.  Incidental discovery of one or more thyroid nodule(s) measuring   more than 1.5 cm and without suspicious features is noted in this patient who is  "above 35 years old; according to guidelines published in the February 2015 white paper on incidental thyroid nodules in the Journal of the American College of Radiology   (JACR), further characterization with thyroid ultrasound is recommended.     THORACIC INLET:  There is relatively advanced emphysema noted within the visualized lung fields.        IMPRESSION:     CT brain: No acute intracranial abnormality.     CT angiography: There is advanced atherosclerotic change of the aortic arch and particular there is extensive calcification and severe stenosis of the left subclavian at its origin, likely in the range of 80-90%.  Recommend follow-up with vascular   surgery.     No other cervical atherosclerotic change.     Normal intracranial vasculature.     Incidental thyroid nodule(s) for which nonemergent thyroid ultrasound is recommended.     This examination was marked \"immediate notification\" in Epic in order to begin the standard process by which the radiology reading room liaison alerts the referring practitioner.       Administrative Statements   I have spent a total time of 60 minutes in caring for this patient on the day of the visit/encounter including Diagnostic results, Risks and benefits of tx options, Instructions for management, Patient and family education, Importance of tx compliance, Risk factor reductions, Counseling / Coordination of care, and Obtaining or reviewing history  .  "

## 2025-05-04 ENCOUNTER — HOSPITAL ENCOUNTER (OUTPATIENT)
Dept: RADIOLOGY | Facility: HOSPITAL | Age: 75
Discharge: HOME | End: 2025-05-04
Payer: COMMERCIAL

## 2025-05-04 DIAGNOSIS — R42 DIZZINESS AND GIDDINESS: ICD-10-CM

## 2025-05-04 PROCEDURE — 70553 MRI BRAIN STEM W/O & W/DYE: CPT

## 2025-05-04 PROCEDURE — A9585 GADOBUTROL INJECTION: HCPCS

## 2025-05-04 RX ORDER — GADOBUTROL 604.72 MG/ML
4 INJECTION INTRAVENOUS
Status: COMPLETED | OUTPATIENT
Start: 2025-05-04 | End: 2025-05-04

## 2025-05-04 RX ADMIN — GADOBUTROL 4 ML: 604.72 INJECTION INTRAVENOUS at 12:29

## 2025-05-05 ENCOUNTER — RESULTS FOLLOW-UP (OUTPATIENT)
Age: 75
End: 2025-05-05

## 2025-05-28 ENCOUNTER — TELEPHONE (OUTPATIENT)
Age: 75
End: 2025-05-28

## 2025-06-11 ENCOUNTER — OFFICE VISIT (OUTPATIENT)
Dept: INTERNAL MEDICINE CLINIC | Facility: CLINIC | Age: 75
End: 2025-06-11
Payer: COMMERCIAL

## 2025-06-11 VITALS
HEIGHT: 63 IN | BODY MASS INDEX: 17.72 KG/M2 | WEIGHT: 100 LBS | DIASTOLIC BLOOD PRESSURE: 70 MMHG | HEART RATE: 58 BPM | OXYGEN SATURATION: 97 % | SYSTOLIC BLOOD PRESSURE: 105 MMHG

## 2025-06-11 DIAGNOSIS — H60.12 CELLULITIS OF AURICLE OF LEFT EAR: ICD-10-CM

## 2025-06-11 DIAGNOSIS — R42 VERTIGO: Primary | ICD-10-CM

## 2025-06-11 PROCEDURE — 99213 OFFICE O/P EST LOW 20 MIN: CPT

## 2025-06-11 NOTE — PROGRESS NOTES
Name: Sherrie Tovar      : 1950      MRN: [id number]  Encounter Provider: Renee Wallace MD  Encounter Date: 2025   [facility]  :  Assessment & Plan  Vertigo  Persistent and recurrent  Recently evaluated by neurology --repeat MRI unremarkable  Patient notes PT has been helpful in the past  New referral provided to restart PT - vestibular rehab  Orders:  •  Ambulatory Referral to Physical Therapy; Future    Cellulitis of auricle of left ear  Patient with hx of allergies and intolerance to multiple antibiotics  Patient will find documentation of previous antibiotics that were tolerable in the past and call back with name  Plan to send prescription to patient's pharmacy              History of Present Illness   HPI  Patient returns for a follow-up visit with persistent concerns of dizziness and vertigo.  Also endorses tinnitus in the right ear and pain on the external surface of the left ear.   She has undergone physical therapy for vestibular rehab in the past and notes significant improvement with the sessions.  Has not been going in quite some time and would like to resume.      Review of Systems   Constitutional:  Positive for fatigue. Negative for chills and fever.   HENT:  Positive for ear pain and tinnitus. Negative for congestion.    Eyes:  Negative for visual disturbance.   Respiratory:  Negative for shortness of breath.    Cardiovascular:  Negative for chest pain and palpitations.   Gastrointestinal:  Negative for abdominal pain, diarrhea, nausea and vomiting.   Genitourinary:  Positive for frequency and urgency.   Musculoskeletal:  Positive for arthralgias, back pain, gait problem and myalgias.   Skin:  Positive for rash (irritation and redness of the left outer ear).   Neurological:  Positive for dizziness and weakness. Negative for syncope, speech difficulty, numbness and headaches.   Psychiatric/Behavioral:  Positive for sleep  "disturbance. Negative for confusion. The patient is nervous/anxious.        Objective   /70   Pulse 58   Ht 5' 3\" (1.6 m)   Wt 45.4 kg (100 lb)   SpO2 97%   BMI 17.71 kg/m²      Physical Exam  Vitals and nursing note reviewed.   Constitutional:       General: She is not in acute distress.     Appearance: She is not ill-appearing or toxic-appearing.   HENT:      Head: Atraumatic.     Eyes:      General:         Right eye: No discharge.         Left eye: No discharge.      Extraocular Movements: Extraocular movements intact.      Conjunctiva/sclera: Conjunctivae normal.       Cardiovascular:      Rate and Rhythm: Normal rate.      Pulses: Normal pulses.   Pulmonary:      Effort: Pulmonary effort is normal. No respiratory distress.     Musculoskeletal:         General: Normal range of motion.      Cervical back: Normal range of motion.     Skin:     General: Skin is warm and dry.      Findings: Erythema (L ear) present.     Neurological:      Mental Status: She is alert and oriented to person, place, and time.      Coordination: Coordination abnormal.      Gait: Gait abnormal.     Psychiatric:         Mood and Affect: Mood is anxious.         Behavior: Behavior normal.         "

## 2025-06-11 NOTE — ASSESSMENT & PLAN NOTE
Persistent and recurrent  Recently evaluated by neurology --repeat MRI unremarkable  Patient notes PT has been helpful in the past  New referral provided to restart PT - vestibular rehab  Orders:  •  Ambulatory Referral to Physical Therapy; Future

## 2025-07-16 ENCOUNTER — TELEPHONE (OUTPATIENT)
Age: 75
End: 2025-07-16

## 2025-07-16 NOTE — TELEPHONE ENCOUNTER
Pt came into office stating she had a appt, pt showed appt card for 7/15- I advised PT that the DR was out on paternity leave and she should have been called and rescheduled - handed conversation over to ANNA CHAIREZ      After conversation concluded pt requested our names due to the confusion of her appt date